# Patient Record
Sex: FEMALE | Race: WHITE | NOT HISPANIC OR LATINO | Employment: FULL TIME | URBAN - METROPOLITAN AREA
[De-identification: names, ages, dates, MRNs, and addresses within clinical notes are randomized per-mention and may not be internally consistent; named-entity substitution may affect disease eponyms.]

---

## 2018-02-03 ENCOUNTER — OFFICE VISIT (OUTPATIENT)
Dept: URGENT CARE | Facility: PHYSICIAN GROUP | Age: 48
End: 2018-02-03
Payer: COMMERCIAL

## 2018-02-03 ENCOUNTER — HOSPITAL ENCOUNTER (OUTPATIENT)
Facility: MEDICAL CENTER | Age: 48
End: 2018-02-03
Attending: PHYSICIAN ASSISTANT
Payer: COMMERCIAL

## 2018-02-03 VITALS
WEIGHT: 208 LBS | DIASTOLIC BLOOD PRESSURE: 76 MMHG | HEIGHT: 62 IN | TEMPERATURE: 98.3 F | HEART RATE: 83 BPM | OXYGEN SATURATION: 97 % | SYSTOLIC BLOOD PRESSURE: 122 MMHG | BODY MASS INDEX: 38.28 KG/M2 | RESPIRATION RATE: 16 BRPM

## 2018-02-03 DIAGNOSIS — L02.211 ABSCESS OF SKIN OF ABDOMEN: ICD-10-CM

## 2018-02-03 DIAGNOSIS — S31.109A OPEN WOUND OF ABDOMINAL WALL, INITIAL ENCOUNTER: ICD-10-CM

## 2018-02-03 PROCEDURE — 99000 SPECIMEN HANDLING OFFICE-LAB: CPT | Performed by: PHYSICIAN ASSISTANT

## 2018-02-03 PROCEDURE — 99204 OFFICE O/P NEW MOD 45 MIN: CPT | Performed by: PHYSICIAN ASSISTANT

## 2018-02-03 PROCEDURE — 87077 CULTURE AEROBIC IDENTIFY: CPT

## 2018-02-03 PROCEDURE — 87070 CULTURE OTHR SPECIMN AEROBIC: CPT

## 2018-02-03 PROCEDURE — 87075 CULTR BACTERIA EXCEPT BLOOD: CPT

## 2018-02-03 PROCEDURE — 87186 SC STD MICRODIL/AGAR DIL: CPT

## 2018-02-03 PROCEDURE — 87205 SMEAR GRAM STAIN: CPT

## 2018-02-03 RX ORDER — AMLODIPINE BESYLATE 10 MG/1
10 TABLET ORAL DAILY
COMMUNITY
End: 2021-07-28 | Stop reason: SDUPTHER

## 2018-02-03 RX ORDER — ATORVASTATIN CALCIUM 20 MG/1
20 TABLET, FILM COATED ORAL NIGHTLY
COMMUNITY
End: 2021-07-28

## 2018-02-03 RX ORDER — ATENOLOL 50 MG/1
50 TABLET ORAL DAILY
COMMUNITY
End: 2021-07-28 | Stop reason: SDUPTHER

## 2018-02-03 RX ORDER — SULFAMETHOXAZOLE AND TRIMETHOPRIM 800; 160 MG/1; MG/1
1 TABLET ORAL 2 TIMES DAILY
Qty: 20 TAB | Refills: 0 | Status: SHIPPED | OUTPATIENT
Start: 2018-02-03 | End: 2021-07-28

## 2018-02-04 LAB
GRAM STN SPEC: NORMAL
SIGNIFICANT IND 70042: NORMAL
SITE SITE: NORMAL
SOURCE SOURCE: NORMAL

## 2018-02-05 ASSESSMENT — ENCOUNTER SYMPTOMS
RESPIRATORY NEGATIVE: 1
CARDIOVASCULAR NEGATIVE: 1
NEUROLOGICAL NEGATIVE: 1
ROS SKIN COMMENTS: SEE HPI
GASTROINTESTINAL NEGATIVE: 1
MUSCULOSKELETAL NEGATIVE: 1
CONSTITUTIONAL NEGATIVE: 1
BRUISES/BLEEDS EASILY: 0

## 2018-02-05 NOTE — PROGRESS NOTES
Subjective:      Sanna Yuen is a 47 y.o. female who presents with Abscess (C/o sore on abd, 1 week)        Abscess     Patient presents today for concern of abdominal wall wound/abscess.  Patient states it has been there for approx 1 week.  She believes it may have drained but is not sure.  It is on an area where her pants rub and feels that having to work has exacerbated this all week. It is tender but not exquisitely so.  She has felt fatigued non-specifically however no fevers or chills.  No attempted interventions other than keeping it covered.  Patient is Type 2 DM.     Review of Systems   Constitutional: Negative.    Respiratory: Negative.    Cardiovascular: Negative.    Gastrointestinal: Negative.    Musculoskeletal: Negative.    Skin:        SEE HPI   Neurological: Negative.    Endo/Heme/Allergies: Does not bruise/bleed easily.   All other systems reviewed and are negative.       PMH:  has no past medical history of Breast cancer (CMS-[facility]).  MEDS:   Current Outpatient Prescriptions:   •  metFORMIN (GLUCOPHAGE) 500 MG Tab, Take 500 mg by mouth 2 times a day, with meals., Disp: , Rfl:   •  atorvastatin (LIPITOR) 20 MG Tab, Take 20 mg by mouth every evening., Disp: , Rfl:   •  atenolol (TENORMIN) 50 MG Tab, Take 50 mg by mouth every day., Disp: , Rfl:   •  amLODIPine (NORVASC) 10 MG Tab, Take 10 mg by mouth every day., Disp: , Rfl:   •  Multiple Vitamins-Minerals (MULTIVITAMIN ADULT PO), Take  by mouth., Disp: , Rfl:   •  IRON PO, Take  by mouth., Disp: , Rfl:   •  Multiple Vitamins-Minerals (EYE VITAMINS PO), Take  by mouth., Disp: , Rfl:   •  sulfamethoxazole-trimethoprim (BACTRIM DS) 800-160 MG tablet, Take 1 Tab by mouth 2 times a day., Disp: 20 Tab, Rfl: 0  ALLERGIES:   Allergies   Allergen Reactions   • Niacin Itching     Flushed face   • Penicillins Itching and Unspecified     Mouth sores     SURGHX: History reviewed. No pertinent surgical history.  SOCHX:  reports that she has been smoking Cigarettes.  " She has never used smokeless tobacco.  FH: Family history was reviewed, no pertinent findings to report     Objective:     /76   Pulse 83   Temp 36.8 °C (98.3 °F)   Resp 16   Ht 1.575 m (5' 2\")   Wt 94.3 kg (208 lb)   SpO2 97%   BMI 38.04 kg/m²      Physical Exam   Constitutional: She is oriented to person, place, and time. She appears well-developed and well-nourished. No distress.   Neck: Normal range of motion. Neck supple.   Cardiovascular: Normal rate and regular rhythm.    Pulmonary/Chest: Effort normal and breath sounds normal.   Abdominal: Soft. Bowel sounds are normal. There is no tenderness.       Lymphadenopathy:     She has no cervical adenopathy.        Right: No inguinal and no supraclavicular adenopathy present.        Left: No supraclavicular adenopathy present.   Neurological: She is alert and oriented to person, place, and time.   Skin: Skin is warm and dry.   Psychiatric: Her behavior is normal.   Vitals reviewed.          Assessment/Plan:     1. Abscess of skin of abdomen  sulfamethoxazole-trimethoprim (BACTRIM DS) 800-160 MG tablet    REFERRAL TO WOUND CLINIC    ANAEROBIC/AEROBIC/GRAM STAIN   2. Open wound of abdominal wall, initial encounter       5 x 5 mm        -wound irrigated and dressed for patient.   -basic wound care discussed, keeping clean, avoiding irritation from repeated rubbing,etc  -culture taken.   -oral abx for abscess/resultant mild cellulitis as on exam.   -watch for worsening redness, fevers and proceed to ER if this occurs  -PCP follow up  -wound referral placed for follow up.     Supportive care, differential diagnoses, and indications for immediate follow-up discussed with patient.   Pathogenesis of diagnosis discussed including typical length and natural progression.   Instructed to return to clinic or nearest emergency department for any change in condition, further concerns, or worsening of symptoms.  Patient states understanding of the plan of care and " discharge instructions.  Instructed to make an appointment, for follow up, with their primary care provider.      Prabha Cobian P.A.-C.

## 2018-02-06 LAB
BACTERIA WND AEROBE CULT: ABNORMAL
BACTERIA WND AEROBE CULT: ABNORMAL
GRAM STN SPEC: ABNORMAL
SIGNIFICANT IND 70042: ABNORMAL
SITE SITE: ABNORMAL
SOURCE SOURCE: ABNORMAL

## 2018-02-07 LAB
BACTERIA SPEC ANAEROBE CULT: NORMAL
SIGNIFICANT IND 70042: NORMAL
SITE SITE: NORMAL
SOURCE SOURCE: NORMAL

## 2018-02-14 ENCOUNTER — APPOINTMENT (OUTPATIENT)
Dept: WOUND CARE | Facility: MEDICAL CENTER | Age: 48
End: 2018-02-14
Attending: PHYSICIAN ASSISTANT
Payer: COMMERCIAL

## 2018-02-16 ENCOUNTER — APPOINTMENT (OUTPATIENT)
Dept: WOUND CARE | Facility: MEDICAL CENTER | Age: 48
End: 2018-02-16
Attending: PHYSICIAN ASSISTANT
Payer: COMMERCIAL

## 2018-04-28 ENCOUNTER — HOSPITAL ENCOUNTER (OUTPATIENT)
Dept: LAB | Facility: MEDICAL CENTER | Age: 48
End: 2018-04-28
Attending: FAMILY MEDICINE
Payer: COMMERCIAL

## 2018-04-28 LAB
ALBUMIN SERPL BCP-MCNC: 4 G/DL (ref 3.2–4.9)
ALBUMIN/GLOB SERPL: 1.4 G/DL
ALP SERPL-CCNC: 50 U/L (ref 30–99)
ALT SERPL-CCNC: 20 U/L (ref 2–50)
ANION GAP SERPL CALC-SCNC: 7 MMOL/L (ref 0–11.9)
AST SERPL-CCNC: 19 U/L (ref 12–45)
BASOPHILS # BLD AUTO: 1.4 % (ref 0–1.8)
BASOPHILS # BLD: 0.07 K/UL (ref 0–0.12)
BILIRUB SERPL-MCNC: 0.3 MG/DL (ref 0.1–1.5)
BUN SERPL-MCNC: 16 MG/DL (ref 8–22)
CALCIUM SERPL-MCNC: 8.7 MG/DL (ref 8.5–10.5)
CHLORIDE SERPL-SCNC: 106 MMOL/L (ref 96–112)
CHOLEST SERPL-MCNC: 178 MG/DL (ref 100–199)
CO2 SERPL-SCNC: 25 MMOL/L (ref 20–33)
CREAT SERPL-MCNC: 0.55 MG/DL (ref 0.5–1.4)
EOSINOPHIL # BLD AUTO: 0.2 K/UL (ref 0–0.51)
EOSINOPHIL NFR BLD: 4.1 % (ref 0–6.9)
ERYTHROCYTE [DISTWIDTH] IN BLOOD BY AUTOMATED COUNT: 48.5 FL (ref 35.9–50)
GLOBULIN SER CALC-MCNC: 2.9 G/DL (ref 1.9–3.5)
GLUCOSE SERPL-MCNC: 174 MG/DL (ref 65–99)
HCT VFR BLD AUTO: 43.1 % (ref 37–47)
HDLC SERPL-MCNC: 39 MG/DL
HGB BLD-MCNC: 13.8 G/DL (ref 12–16)
IMM GRANULOCYTES # BLD AUTO: 0.02 K/UL (ref 0–0.11)
IMM GRANULOCYTES NFR BLD AUTO: 0.4 % (ref 0–0.9)
LDLC SERPL CALC-MCNC: ABNORMAL MG/DL
LYMPHOCYTES # BLD AUTO: 1.48 K/UL (ref 1–4.8)
LYMPHOCYTES NFR BLD: 30.2 % (ref 22–41)
MCH RBC QN AUTO: 30.9 PG (ref 27–33)
MCHC RBC AUTO-ENTMCNC: 32 G/DL (ref 33.6–35)
MCV RBC AUTO: 96.4 FL (ref 81.4–97.8)
MONOCYTES # BLD AUTO: 0.59 K/UL (ref 0–0.85)
MONOCYTES NFR BLD AUTO: 12 % (ref 0–13.4)
NEUTROPHILS # BLD AUTO: 2.54 K/UL (ref 2–7.15)
NEUTROPHILS NFR BLD: 51.9 % (ref 44–72)
NRBC # BLD AUTO: 0 K/UL
NRBC BLD-RTO: 0 /100 WBC
PLATELET # BLD AUTO: 180 K/UL (ref 164–446)
PMV BLD AUTO: 12.4 FL (ref 9–12.9)
POTASSIUM SERPL-SCNC: 3.8 MMOL/L (ref 3.6–5.5)
PROT SERPL-MCNC: 6.9 G/DL (ref 6–8.2)
RBC # BLD AUTO: 4.47 M/UL (ref 4.2–5.4)
SODIUM SERPL-SCNC: 138 MMOL/L (ref 135–145)
TRIGL SERPL-MCNC: 461 MG/DL (ref 0–149)
TSH SERPL DL<=0.005 MIU/L-ACNC: 2.49 UIU/ML (ref 0.38–5.33)
WBC # BLD AUTO: 4.9 K/UL (ref 4.8–10.8)

## 2018-04-28 PROCEDURE — 36415 COLL VENOUS BLD VENIPUNCTURE: CPT

## 2018-04-28 PROCEDURE — 80053 COMPREHEN METABOLIC PANEL: CPT

## 2018-04-28 PROCEDURE — 85025 COMPLETE CBC W/AUTO DIFF WBC: CPT

## 2018-04-28 PROCEDURE — 80061 LIPID PANEL: CPT

## 2018-04-28 PROCEDURE — 84443 ASSAY THYROID STIM HORMONE: CPT

## 2019-02-08 ENCOUNTER — OFFICE VISIT (OUTPATIENT)
Dept: URGENT CARE | Facility: CLINIC | Age: 49
End: 2019-02-08
Payer: COMMERCIAL

## 2019-02-08 VITALS
HEART RATE: 78 BPM | OXYGEN SATURATION: 96 % | WEIGHT: 198 LBS | SYSTOLIC BLOOD PRESSURE: 110 MMHG | HEIGHT: 62 IN | BODY MASS INDEX: 36.44 KG/M2 | TEMPERATURE: 97.8 F | RESPIRATION RATE: 16 BRPM | DIASTOLIC BLOOD PRESSURE: 70 MMHG

## 2019-02-08 DIAGNOSIS — J02.9 PHARYNGITIS, UNSPECIFIED ETIOLOGY: ICD-10-CM

## 2019-02-08 PROCEDURE — 99214 OFFICE O/P EST MOD 30 MIN: CPT | Performed by: FAMILY MEDICINE

## 2019-02-08 RX ORDER — AZITHROMYCIN 250 MG/1
TABLET, FILM COATED ORAL
Qty: 6 TAB | Refills: 0 | Status: SHIPPED | OUTPATIENT
Start: 2019-02-08 | End: 2021-07-28

## 2019-02-08 ASSESSMENT — ENCOUNTER SYMPTOMS
SWOLLEN GLANDS: 1
HEADACHES: 1
SINUS PRESSURE: 0
COUGH: 1
SORE THROAT: 1

## 2019-02-08 NOTE — PROGRESS NOTES
"Subjective:   Sanna Awad a 48 y.o. female who presents for Sinus Problem (x this am, nasal congestion, stuffy nose, headaches,ear fullness and sore throat. Little cough)    Sinus Problem   This is a new problem. The current episode started in the past 7 days. The problem has been waxing and waning since onset. There has been no fever. The pain is mild. Associated symptoms include congestion, coughing, headaches, a sore throat and swollen glands. Pertinent negatives include no sinus pressure.     Review of Systems   HENT: Positive for congestion and sore throat. Negative for sinus pressure.    Respiratory: Positive for cough.    Neurological: Positive for headaches.     Allergies   Allergen Reactions   • Niacin Itching     Flushed face   • Penicillins Itching and Unspecified     Mouth sores      Objective:   /70 (BP Location: Left arm, Patient Position: Sitting, BP Cuff Size: Large adult)   Pulse 78   Temp 36.6 °C (97.8 °F) (Temporal)   Resp 16   Ht 1.575 m (5' 2\")   Wt 89.8 kg (198 lb)   SpO2 96%   BMI 36.21 kg/m²   Physical Exam   Constitutional: She is oriented to person, place, and time. She appears well-developed and well-nourished. No distress.   HENT:   Head: Normocephalic and atraumatic.   Mouth/Throat: Uvula is midline. Posterior oropharyngeal edema and posterior oropharyngeal erythema present. No tonsillar abscesses.   Eyes: Pupils are equal, round, and reactive to light. Conjunctivae and EOM are normal.   Cardiovascular: Normal rate and regular rhythm.    No murmur heard.  Pulmonary/Chest: Effort normal and breath sounds normal. No respiratory distress.   Abdominal: Soft. She exhibits no distension. There is no tenderness.   Lymphadenopathy:     She has cervical adenopathy.        Right cervical: Superficial cervical adenopathy present.        Left cervical: Superficial cervical adenopathy present.        Right: No supraclavicular adenopathy present.        Left: No supraclavicular " adenopathy present.   Neurological: She is alert and oriented to person, place, and time. She has normal reflexes. No sensory deficit.   Skin: Skin is warm and dry.   Psychiatric: She has a normal mood and affect.     Assessment/Plan:   Assessment    1. Pharyngitis, unspecified etiology  - azithromycin (ZITHROMAX) 250 MG Tab; Take 2 tablets by mouth on day one. Take one tablet by mouth the remaining days until gone  Dispense: 6 Tab; Refill: 0    Other orders  - Fexofenadine HCl (ALLEGRA PO); Take  by mouth.      Differential diagnosis, natural history, supportive care, and indications for immediate follow-up discussed.  Return if symptoms worsen or fail to improve.

## 2019-02-08 NOTE — PATIENT INSTRUCTIONS
Pharyngitis  Pharyngitis is redness, pain, and swelling (inflammation) of your pharynx.  What are the causes?  Pharyngitis is usually caused by infection. Most of the time, these infections are from viruses (viral) and are part of a cold. However, sometimes pharyngitis is caused by bacteria (bacterial). Pharyngitis can also be caused by allergies. Viral pharyngitis may be spread from person to person by coughing, sneezing, and personal items or utensils (cups, forks, spoons, toothbrushes). Bacterial pharyngitis may be spread from person to person by more intimate contact, such as kissing.  What are the signs or symptoms?  Symptoms of pharyngitis include:  · Sore throat.  · Tiredness (fatigue).  · Low-grade fever.  · Headache.  · Joint pain and muscle aches.  · Skin rashes.  · Swollen lymph nodes.  · Plaque-like film on throat or tonsils (often seen with bacterial pharyngitis).  How is this diagnosed?  Your health care provider will ask you questions about your illness and your symptoms. Your medical history, along with a physical exam, is often all that is needed to diagnose pharyngitis. Sometimes, a rapid strep test is done. Other lab tests may also be done, depending on the suspected cause.  How is this treated?  Viral pharyngitis will usually get better in 3-4 days without the use of medicine. Bacterial pharyngitis is treated with medicines that kill germs (antibiotics).  Follow these instructions at home:  · Drink enough water and fluids to keep your urine clear or pale yellow.  · Only take over-the-counter or prescription medicines as directed by your health care provider:  ¨ If you are prescribed antibiotics, make sure you finish them even if you start to feel better.  ¨ Do not take aspirin.  · Get lots of rest.  · Gargle with 8 oz of salt water (½ tsp of salt per 1 qt of water) as often as every 1-2 hours to soothe your throat.  · Throat lozenges (if you are not at risk for choking) or sprays may be used to  soothe your throat.  Contact a health care provider if:  · You have large, tender lumps in your neck.  · You have a rash.  · You cough up green, yellow-brown, or bloody spit.  Get help right away if:  · Your neck becomes stiff.  · You drool or are unable to swallow liquids.  · You vomit or are unable to keep medicines or liquids down.  · You have severe pain that does not go away with the use of recommended medicines.  · You have trouble breathing (not caused by a stuffy nose).  This information is not intended to replace advice given to you by your health care provider. Make sure you discuss any questions you have with your health care provider.  Document Released: 12/18/2006 Document Revised: 05/25/2017 Document Reviewed: 08/25/2014  [employer] Interactive Patient Education © 2017 [employer]

## 2019-02-08 NOTE — LETTER
February 8, 2019         Patient: Sanna Yuen   YOB: 1970   Date of Visit: 2/8/2019           To Whom it May Concern:    Sanna Yuen was seen in my clinic on 2/8/2019. She may return to work on 2/9/2019..    If you have any questions or concerns, please don't hesitate to call.        Sincerely,           Ifeanyi Ashley M.D.  Electronically Signed

## 2019-02-12 ENCOUNTER — OFFICE VISIT (OUTPATIENT)
Dept: URGENT CARE | Facility: CLINIC | Age: 49
End: 2019-02-12
Payer: COMMERCIAL

## 2019-02-12 VITALS
WEIGHT: 198 LBS | OXYGEN SATURATION: 95 % | HEIGHT: 62 IN | HEART RATE: 93 BPM | SYSTOLIC BLOOD PRESSURE: 112 MMHG | DIASTOLIC BLOOD PRESSURE: 68 MMHG | BODY MASS INDEX: 36.44 KG/M2 | TEMPERATURE: 98.1 F | RESPIRATION RATE: 14 BRPM

## 2019-02-12 DIAGNOSIS — R05.9 COUGH: ICD-10-CM

## 2019-02-12 DIAGNOSIS — J06.9 UPPER RESPIRATORY TRACT INFECTION, UNSPECIFIED TYPE: ICD-10-CM

## 2019-02-12 DIAGNOSIS — R09.81 SINUS CONGESTION: ICD-10-CM

## 2019-02-12 PROCEDURE — 99214 OFFICE O/P EST MOD 30 MIN: CPT | Performed by: PHYSICIAN ASSISTANT

## 2019-02-12 RX ORDER — PROMETHAZINE HYDROCHLORIDE AND CODEINE PHOSPHATE 6.25; 1 MG/5ML; MG/5ML
5 SYRUP ORAL EVERY 12 HOURS PRN
Qty: 60 ML | Refills: 0 | Status: SHIPPED | OUTPATIENT
Start: 2019-02-12 | End: 2019-02-19

## 2019-02-12 RX ORDER — CETIRIZINE HYDROCHLORIDE, PSEUDOEPHEDRINE HYDROCHLORIDE 5; 120 MG/1; MG/1
1 TABLET, FILM COATED, EXTENDED RELEASE ORAL 2 TIMES DAILY
Qty: 60 TAB | Refills: 0 | Status: SHIPPED | OUTPATIENT
Start: 2019-02-12 | End: 2021-07-28

## 2019-02-12 ASSESSMENT — ENCOUNTER SYMPTOMS
WHEEZING: 0
SORE THROAT: 1
SHORTNESS OF BREATH: 0
NAUSEA: 0
ABDOMINAL PAIN: 0
SPUTUM PRODUCTION: 0
FEVER: 0
CHILLS: 0
VOMITING: 0
DIARRHEA: 0
COUGH: 1

## 2019-02-12 NOTE — LETTER
February 12, 2019       Patient: Sanna Yuen   YOB: 1970   Date of Visit: 2/12/2019         To Whom It May Concern:    It is my medical opinion that Sanna Yuen should be excused from work for tomorrow due to illness.        If you have any questions or concerns, please don't hesitate to call [phone]          Sincerely,          Michael Rossi P.A.-C.  Electronically Signed

## 2019-02-12 NOTE — PROGRESS NOTES
"Subjective:   Sanna Yuen is a 48 y.o. female who presents for Follow-Up (pt was seen and not feeling better)        Cough   This is a new problem. The current episode started in the past 7 days. Associated symptoms include a sore throat. Pertinent negatives include no chills, ear pain, fever, rash, shortness of breath or wheezing.     Noted one day of cough, was told had strep throat, cough keeps awake at night, c/o HA w/ coughing, dry cough, denies wheeze, c/o ear pain bilat, denies nausea/vomitnig/abdpain/diarrhea/rash, denies pMH of asthma, remote PMH of bronchitis/pneumonia, tried using OTC cough meds.      Review of Systems   Constitutional: Negative for chills and fever.   HENT: Positive for congestion and sore throat. Negative for ear pain.    Respiratory: Positive for cough. Negative for sputum production, shortness of breath and wheezing.    Gastrointestinal: Negative for abdominal pain, diarrhea, nausea and vomiting.   Skin: Negative for rash.     Allergies   Allergen Reactions   • Niacin Itching     Flushed face   • Penicillins Itching and Unspecified     Mouth sores   I have worn a mask for the entire encounter with this patient.    Objective:   /68 (BP Location: Left arm, Patient Position: Sitting, BP Cuff Size: Adult)   Pulse 93   Temp 36.7 °C (98.1 °F)   Resp 14   Ht 1.575 m (5' 2\")   Wt 89.8 kg (198 lb)   SpO2 95%   BMI 36.21 kg/m²   Physical Exam   Constitutional: She is oriented to person, place, and time. She appears well-developed and well-nourished. No distress.   HENT:   Head: Normocephalic and atraumatic.   Right Ear: Tympanic membrane, external ear and ear canal normal.   Left Ear: Tympanic membrane, external ear and ear canal normal.   Nose: Nose normal.   Mouth/Throat: Uvula is midline and mucous membranes are normal. Posterior oropharyngeal erythema ( mild PND) present. No oropharyngeal exudate, posterior oropharyngeal edema or tonsillar abscesses.   Eyes: Conjunctivae and " lids are normal. Right eye exhibits no discharge. Left eye exhibits no discharge. No scleral icterus.   Neck: Neck supple.   Pulmonary/Chest: Effort normal. No respiratory distress. She has no decreased breath sounds. She has no wheezes. She has no rhonchi. She has no rales.   Musculoskeletal: Normal range of motion.   Lymphadenopathy:     She has cervical adenopathy ( mild bilat).   Neurological: She is alert and oriented to person, place, and time. She is not disoriented.   Skin: Skin is warm and dry. She is not diaphoretic. No erythema. No pallor.   Psychiatric: Her speech is normal and behavior is normal.   Nursing note and vitals reviewed.        Assessment/Plan:   1. Cough  - promethazine-codeine (PHENERGAN-CODEINE) 6.25-10 MG/5ML Syrup; Take 5 mL by mouth every 12 hours as needed for up to 7 days.  Dispense: 60 mL; Refill: 0    2. Sinus congestion  - cetirizine-psuedoephedrine (ZYRTEC-D) 5-120 MG per tablet; Take 1 Tab by mouth 2 times a day.  Dispense: 60 Tab; Refill: 0    3. Upper respiratory tract infection, unspecified type  Supportive care is reviewed with patient/caregiver - recommend to push PO fluids and electrolytes, Nsaids/tylenol, netti pot/saline irrig, humidifier in home, flonase, ponaris, antihistamine , decongestant, no abx now as just finished day #5 of zpack yesterday, discussed OTC options for s/sx, Cautioned regarding potential for sedation with medication.    Differential diagnosis, natural history, supportive care, and indications for immediate follow-up discussed.

## 2019-08-03 ENCOUNTER — HOSPITAL ENCOUNTER (OUTPATIENT)
Dept: LAB | Facility: MEDICAL CENTER | Age: 49
End: 2019-08-03
Attending: FAMILY MEDICINE
Payer: COMMERCIAL

## 2019-08-03 LAB
ALBUMIN SERPL BCP-MCNC: 4 G/DL (ref 3.2–4.9)
ALBUMIN/GLOB SERPL: 1.6 G/DL
ALP SERPL-CCNC: 52 U/L (ref 30–99)
ALT SERPL-CCNC: 22 U/L (ref 2–50)
ANION GAP SERPL CALC-SCNC: 10 MMOL/L (ref 0–11.9)
AST SERPL-CCNC: 22 U/L (ref 12–45)
BILIRUB SERPL-MCNC: 0.5 MG/DL (ref 0.1–1.5)
BUN SERPL-MCNC: 14 MG/DL (ref 8–22)
CALCIUM SERPL-MCNC: 8.8 MG/DL (ref 8.5–10.5)
CHLORIDE SERPL-SCNC: 105 MMOL/L (ref 96–112)
CHOLEST SERPL-MCNC: 164 MG/DL (ref 100–199)
CO2 SERPL-SCNC: 24 MMOL/L (ref 20–33)
CREAT SERPL-MCNC: 0.57 MG/DL (ref 0.5–1.4)
FASTING STATUS PATIENT QL REPORTED: NORMAL
GLOBULIN SER CALC-MCNC: 2.5 G/DL (ref 1.9–3.5)
GLUCOSE SERPL-MCNC: 142 MG/DL (ref 65–99)
HDLC SERPL-MCNC: 40 MG/DL
LDLC SERPL CALC-MCNC: 49 MG/DL
POTASSIUM SERPL-SCNC: 4.3 MMOL/L (ref 3.6–5.5)
PROT SERPL-MCNC: 6.5 G/DL (ref 6–8.2)
SODIUM SERPL-SCNC: 139 MMOL/L (ref 135–145)
TRIGL SERPL-MCNC: 377 MG/DL (ref 0–149)

## 2019-08-03 PROCEDURE — 80053 COMPREHEN METABOLIC PANEL: CPT

## 2019-08-03 PROCEDURE — 80061 LIPID PANEL: CPT

## 2019-08-03 PROCEDURE — 36415 COLL VENOUS BLD VENIPUNCTURE: CPT

## 2019-11-01 ENCOUNTER — OFFICE VISIT (OUTPATIENT)
Dept: URGENT CARE | Facility: CLINIC | Age: 49
End: 2019-11-01
Payer: COMMERCIAL

## 2019-11-01 VITALS
DIASTOLIC BLOOD PRESSURE: 82 MMHG | TEMPERATURE: 97.6 F | RESPIRATION RATE: 16 BRPM | HEART RATE: 68 BPM | HEIGHT: 62 IN | OXYGEN SATURATION: 97 % | BODY MASS INDEX: 37.17 KG/M2 | WEIGHT: 202 LBS | SYSTOLIC BLOOD PRESSURE: 124 MMHG

## 2019-11-01 DIAGNOSIS — M79.605 PAIN OF LEFT LOWER EXTREMITY: ICD-10-CM

## 2019-11-01 DIAGNOSIS — S76.911A MUSCLE STRAIN OF RIGHT THIGH, INITIAL ENCOUNTER: ICD-10-CM

## 2019-11-01 PROCEDURE — 99214 OFFICE O/P EST MOD 30 MIN: CPT | Performed by: PHYSICIAN ASSISTANT

## 2019-11-01 RX ORDER — METHYLPREDNISOLONE 4 MG/1
TABLET ORAL
Qty: 21 TAB | Refills: 0 | Status: SHIPPED | OUTPATIENT
Start: 2019-11-01 | End: 2021-07-28

## 2019-11-01 RX ORDER — FENOFIBRATE 145 MG/1
145 TABLET, COATED ORAL
Refills: 3 | COMMUNITY
Start: 2019-09-05 | End: 2021-07-28 | Stop reason: SDUPTHER

## 2019-11-01 ASSESSMENT — ENCOUNTER SYMPTOMS
WEAKNESS: 0
LEG PAIN: 1
FEVER: 0
TINGLING: 0
SENSORY CHANGE: 0
NAUSEA: 0
CHILLS: 0
DIARRHEA: 0
VOMITING: 0
WHEEZING: 0
SHORTNESS OF BREATH: 0

## 2019-11-20 ENCOUNTER — OCCUPATIONAL MEDICINE (OUTPATIENT)
Dept: URGENT CARE | Facility: CLINIC | Age: 49
End: 2019-11-20
Payer: COMMERCIAL

## 2019-11-20 VITALS
RESPIRATION RATE: 18 BRPM | DIASTOLIC BLOOD PRESSURE: 88 MMHG | HEART RATE: 84 BPM | HEIGHT: 62 IN | OXYGEN SATURATION: 98 % | WEIGHT: 198 LBS | TEMPERATURE: 97.5 F | BODY MASS INDEX: 36.44 KG/M2 | SYSTOLIC BLOOD PRESSURE: 136 MMHG

## 2019-11-20 DIAGNOSIS — S76.911A MUSCLE STRAIN OF RIGHT THIGH, INITIAL ENCOUNTER: ICD-10-CM

## 2019-11-20 PROCEDURE — 99214 OFFICE O/P EST MOD 30 MIN: CPT | Performed by: PHYSICIAN ASSISTANT

## 2019-11-20 RX ORDER — MELOXICAM 15 MG/1
15 TABLET ORAL DAILY
Qty: 30 TAB | Refills: 0 | Status: SHIPPED | OUTPATIENT
Start: 2019-11-20 | End: 2019-12-02 | Stop reason: SDUPTHER

## 2019-11-20 ASSESSMENT — ENCOUNTER SYMPTOMS
ARTHRALGIAS: 0
LEG PAIN: 1
VOMITING: 0
WEAKNESS: 0
MYALGIAS: 1
FEVER: 0

## 2019-11-20 NOTE — LETTER
[facility]  [address] [place] [address]  Phone:  [phone] - Fax:  [phone]   [facility] Progress Report and Disability Certification  Date of Service: 11/20/2019   No Show:  No  Date / Time of Next Visit: 11/29/2019   Claim Information   Patient Name: Sanna Yuen  Claim Number:     Employer: [employer]  Date of Injury: 5/12/2019     Insurer / TPA: [employer]  ID / SSN:     Occupation: DELI   Diagnosis: The encounter diagnosis was Muscle strain of right thigh, initial encounter.    Medical Information   Related to Industrial Injury? Yes    Subjective Complaints:  Date of Injury:  5/12/2019. Pt complains of right anterior upper thigh/ groin pain x 6 months. Pain is aching and does not radiate. Pain is worse with standing, walking, climbing stairs. Symptoms began suddenly after trip and fall accident at work- directly impacted pallet. Previously evaluated at [facility] - May 13th for this- states work comp paperwork was filed.  She was never reevaluated following this incident. She is unsure if this claim was closed. Stopped statin- this did not help. Tried steroid pack-helped a small amount, using heat-helps a bit, ibuprofen and naproxen do not help. Denies numbness or tingling in foot. Denies prior injury. Denies second deven   Objective Findings: right hip:  General: No gross deformity  ROM: flexion 70 degrees, extension 10, ER 20, IR 20, abduction 30, adduction 20   Palpation: Origin of quadrant and groin muscles diffusely tender to palpation.  Quadriceps tendon also moderately tender to palpation.  Right greater trochanter mildly tender to palpation.  Gluten max, gluten nontender to palpation.   Strength: Not tested as patient could not tolerate due to pain   special tests: + Sen + Fadir -, Straight leg raise -  Neuro: Sensation intact and equal bilaterally in LE's   Pre-Existing Condition(s):     Assessment:   Initial Visit    Status:  Additional Care Required  Permanent Disability:No    Plan:      Diagnostics:      Comments:  Start meloxicam.  Continue heat.  Work restrictions.  Follow-up at [facility] clinic in 7 to 10 days.    Disability Information   Status: Released to Restricted Duty    From:  2019  Through: 2019 Restrictions are:     Physical Restrictions   Sitting:    Standing:  < or = to 1 hr/day Stoopin hrs/day Bending:      Squattin hrs/day Walking:  < or = to 1 hr/day Climbing:    Pushing:      Pulling:    Other:    Reaching Above Shoulder (L):   Reaching Above Shoulder (R):       Reaching Below Shoulder (L):    Reaching Below Shoulder (R):      Not to exceed Weight Limits   Carrying(hrs):   Weight Limit(lb): < or = to 10 pounds Lifting(hrs):   Weight  Limit(lb): < or = to 10 pounds   Comments:      Repetitive Actions   Hands: i.e. Fine Manipulations from Grasping:     Feet: i.e. Operating Foot Controls:     Driving / Operate Machinery:     Physician Name: Juancarlos Becerra P.A.-C. Physician Signature:   e-Signature: Dr. Nilo Nguyễn, Medical Director   Clinic Name / Location: [facility]  [address] [place] [id number] Clinic Phone Number: Dept: [phone]   Appointment Time: 9:15 Am Visit Start Time: 9:35 AM   Check-In Time:  9:16 Am Visit Discharge Time: 10:13 AM   Original-Treating Physician or Chiropractor    Page 2-Insurer/TPA    Page 3-Employer    Page 4-Employee

## 2019-11-20 NOTE — LETTER
"EMPLOYEE’S CLAIM FOR COMPENSATION/ REPORT OF INITIAL TREATMENT  FORM C-4    EMPLOYEE’S CLAIM - PROVIDE ALL INFORMATION REQUESTED   First Name  Sanna Last Name  Alpa Birthdate                    1970                Sex  female Claim Number   Home Address  [address] Age  49 y.o. 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 (if applicable)  GOING OUT TO DUMP TRASH     How did this injury or occupational disease occur? (Be specific an answer in detail. 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  I hereby authorize any physician, chiropractor, surgeon, practitioner, or other person, any hospital, including [facility] or [facility], any medical service organization, any insurance company, or other institution or organization to release to each other, any medical or other information, including benefits paid or payable, pertinent to this injury or disease, except information relative to diagnosis, treatment and/or counseling for AIDS, psychological conditions, alcohol or controlled substances, for which I must give specific authorization.  A Photostat of this authorization shall be as valid as the original.     Date   Place   Employee’s Signature   THIS REPORT MUST BE COMPLETED AND MAILED WITHIN 3 WORKING DAYS OF TREATMENT   Place  [facility]  Name of Facility  [facility]   Date  11/20/2019 Diagnosis  (S76.911A) Muscle strain of right thigh, initial encounter Is there evidence the injured employee was under the influence of alcohol and/or another controlled substance at the time of accident?   Hour  9:35 AM Description of Injury or Disease  The encounter diagnosis was Muscle strain of right thigh, initial encounter.     Treatment  Start meloxicam.  Continue heat.  Work restrictions.  Follow-up at [facility] in 7 to 10 days.  Have you advised the patient to remain off work five days or more? No   X-Ray Findings      If Yes   From Date  To Date      From information given by the employee, together with medical evidence, can you directly connect this injury or occupational disease as job incurred?  Yes If No Full Duty No Modified Duty  Yes   Is additional medical care by a physician indicated?  Yes If Modified Duty, Specify any Limitations / Restrictions  Standing and walking not to exceed 1 hour total throughout the day.  Seated work only  "with 10 pound weight restriction.   Do you know of any previous injury or disease contributing to this condition or occupational disease?                            No   Date  11/20/2019 Print Doctor’s Name Juancarlos Becerra P.A.-C. I certify the employer’s copy of  this form was mailed on:   Address  [address] [employer]’s Use Only     [place] Zip  [address]    Provider’s Tax ID Number  [id number] Telephone  Dept: [phone]            e-Signature: Dr. Nilo Nguyễn,   Medical Director Degree  MD        ORIGINAL-TREATING PHYSICIAN OR CHIROPRACTOR    PAGE 2-INSURER/TPA    PAGE 3-EMPLOYER    PAGE 4-EMPLOYEE             Form C-4 (rev.10/07)              BRIEF DESCRIPTION OF RIGHTS AND BENEFITS  (Pursuant to [place]RS 616C.050)    Notice of Injury or Occupational Disease (Incident Report Form C-1): If an injury or occupational disease (OD) arises out of and in the course of employment, you must provide written notice to your employer as soon as practicable, but no later than 7 days after the accident or OD. Your employer shall maintain a sufficient supply of the required forms.    Claim for Compensation (Form C-4): If medical treatment is sought, the form C-4 is available at the place of initial treatment. A completed \"Claim for Compensation\" (Form C-4) must be filed within 90 days after an accident or OD. The treating physician or chiropractor must, within 3 working days after treatment, complete and mail to the employer, the employer's insurer and third-party , the Claim for Compensation.    Medical Treatment: If you require medical treatment for your on-the-job injury or OD, you may be required to select a physician or chiropractor from a list provided by your workers’ compensation insurer, if it has contracted with an Organization for Managed Care (MCO) or Preferred Provider Organization (PPO) or providers of health care. If your employer has not entered into a contract with an MCO or " PPO, you may select a physician or chiropractor from the Panel of Physicians and Chiropractors. Any medical costs related to your industrial injury or OD will be paid by your insurer.    Temporary Total Disability (TTD): If your doctor has certified that you are unable to work for a period of at least 5 consecutive days, or 5 cumulative days in a 20-day period, or places restrictions on you that your employer does not accommodate, you may be entitled to TTD compensation.    Temporary Partial Disability (TPD): If the wage you receive upon reemployment is less than the compensation for TTD to which you are entitled, the insurer may be required to pay you TPD compensation to make up the difference. TPD can only be paid for a maximum of 24 months.    Permanent Partial Disability (PPD): When your medical condition is stable and there is an indication of a PPD as a result of your injury or OD, within 30 days, your insurer must arrange for an evaluation by a rating physician or chiropractor to determine the degree of your PPD. The amount of your PPD award depends on the date of injury, the results of the PPD evaluation and your age and wage.    Permanent Total Disability (PTD): If you are medically certified by a treating physician or chiropractor as permanently and totally disabled and have been granted a PTD status by your insurer, you are entitled to receive monthly benefits not to exceed 66 2/3% of your average monthly wage. The amount of your PTD payments is subject to reduction if you previously received a PPD award.    Vocational Rehabilitation Services: You may be eligible for vocational rehabilitation services if you are unable to return to the job due to a permanent physical impairment or permanent restrictions as a result of your injury or occupational disease.    Transportation and Per Anthony Reimbursement: You may be eligible for travel expenses and per anthony associated with medical treatment.    Reopening: You  may be able to reopen your claim if your condition worsens after claim closure.    Appeal Process: If you disagree with a written determination issued by the insurer or the insurer does not respond to your request, you may appeal to the Department of Administration, , by following the instructions contained in your determination letter. You must appeal the determination within 70 days from the date of the determination letter at [address], [address], [place], [place] [address], or [address], [place], [place] [address]. If you disagree with the  decision, you may appeal to the Department of Administration, . You must file your appeal within 30 days from the date of the  decision letter at [address], [address], [place], [place] [address], or [address], [place], [place] [address]. If you disagree with a decision of an , you may file a petition for judicial review with the District Court. You must do so within 30 days of the Appeal Officer’s decision. You may be represented by an  at your own expense or you may contact the [facility] for possible representation.    [place]  for Injured Workers (NAIW): If you disagree with a  decision, you may request that NAIW represent you without charge at an  Hearing. For information regarding denial of benefits, you may contact the [facility] at: [address], [address], [place] [address], [phone], or [address], [place] [address], [phone]    To File a Complaint with the Division: If you wish to file a complaint with the  of the Division of Industrial Relations (DIR),  please contact the Workers’ Compensation Section, [address], [address], [place], [place] [address], telephone [phone], or [address], [address], [facility]  [place] [address], telephone [phone].    For assistance with Workers’ Compensation Issues: You may contact the Office of the Governor Consumer Health Assistance, [address] [place] [address], [address], [place], [place] [address], Toll Free 1-892.119.2306, Web site: [email or web address].nv., E-mail [email or web address].[email or web address].                   __________________________________________________________________                                                     _________        Employee Name / Signature                                                                                                                                              Date                                                                                                                                                                                                     D-2 (rev. 06/18)

## 2019-11-20 NOTE — PROGRESS NOTES
"Subjective:   Sanna Yuen is a 49 y.o. female who presents for Leg Injury (x6mo, right leg and right arm injury after falling, leg still painful)        Date of Injury:  5/12/2019. Pt complains of right anterior upper thigh/ groin pain x 6 months. Pain is aching and does not radiate. Pain is worse with standing, walking, climbing stairs. Symptoms began suddenly after trip and fall accident at work- directly impacted pallet. Previously evaluated at [place] - May 13th for this- states work comp paperwork was filed.  She was never reevaluated following this incident. She is unsure if this claim was closed. Stopped statin- this did not help. Tried steroid pack-helped a small amount, using heat-helps a bit, ibuprofen and naproxen do not help. Denies numbness or tingling in foot. Denies prior injury. Denies second job.        Leg Pain   This is a new problem. The current episode started more than 1 month ago. The problem occurs constantly. The problem has been unchanged. Associated symptoms include myalgias. Pertinent negatives include no arthralgias, fever, vomiting or weakness. The symptoms are aggravated by standing and walking. She has tried NSAIDs, heat, acetaminophen, rest and sleep for the symptoms. The treatment provided no relief.     Review of Systems   Constitutional: Negative for fever.   Gastrointestinal: Negative for vomiting.   Musculoskeletal: Positive for myalgias. Negative for arthralgias.   Neurological: Negative for weakness.       PMH: No pertinent past medical history to this problem  MEDS: Medications were reviewed in Epic  ALLERGIES: Allergies were reviewed in Epic  SOCHX: Works as  at [email or web address]  FH: No pertinent family history to this problem     Objective:   /88 (BP Location: Left arm, Patient Position: Sitting, BP Cuff Size: Adult)   Pulse 84   Temp 36.4 °C (97.5 °F) (Temporal)   Resp 18   Ht 1.575 m (5' 2\")   Wt 89.8 kg (198 lb)   SpO2 98%   BMI 36.21 kg/m²   Physical " Exam  Vitals signs reviewed.   Constitutional:       General: She is not in acute distress.     Appearance: Normal appearance. She is well-developed. She is not toxic-appearing.   HENT:      Head: Normocephalic and atraumatic.      Right Ear: External ear normal.      Left Ear: External ear normal.      Nose: Nose normal.   Eyes:      General: Lids are normal.      Conjunctiva/sclera: Conjunctivae normal.   Neck:      Musculoskeletal: Neck supple.   Cardiovascular:      Rate and Rhythm: Normal rate and regular rhythm.   Pulmonary:      Effort: Pulmonary effort is normal. No respiratory distress.      Breath sounds: Normal breath sounds.   Musculoskeletal:      Comments: right hip:  General: No gross deformity  ROM: flexion 70 degrees, extension 10, ER 20, IR 20, abduction 30, adduction 20   Palpation: Origin of quadrant and groin muscles diffusely tender to palpation.  Quadriceps tendon also moderately tender to palpation.  Right greater trochanter mildly tender to palpation.  Gluten max, gluten nontender to palpation.   Strength: Not tested as patient could not tolerate due to pain   special tests: + Sen + Fadir -, Straight leg raise -  Neuro: Sensation intact and equal bilaterally in LE's     Skin:     General: Skin is warm and dry.      Capillary Refill: Capillary refill takes less than 2 seconds.   Neurological:      Mental Status: She is alert and oriented to person, place, and time.      Cranial Nerves: No cranial nerve deficit.      Sensory: No sensory deficit.   Psychiatric:         Speech: Speech normal.         Behavior: Behavior normal.         Thought Content: Thought content normal.         Judgment: Judgment normal.           Assessment/Plan:   1. Muscle strain of right thigh, initial encounter      Patient instructed to rest and avoid aggravating activities.  Apply warm, damp heat to the affected area and perform gentle stretches as instructed in clinic.      As patient has not responded to other  NSAIDs I will trial patient on meloxicam.  Patient was advised that she may not take concurrently with other NSAIDs due to risk of GI bleeding.     Due to long duration of symptoms and complexity patient will follow-up at [facility] for next appointment.    Differential diagnosis, natural history, supportive care, and indications for immediate follow-up discussed.

## 2019-12-02 ENCOUNTER — OCCUPATIONAL MEDICINE (OUTPATIENT)
Dept: OCCUPATIONAL MEDICINE | Facility: CLINIC | Age: 49
End: 2019-12-02
Payer: COMMERCIAL

## 2019-12-02 VITALS
BODY MASS INDEX: 37.54 KG/M2 | HEART RATE: 94 BPM | WEIGHT: 204 LBS | OXYGEN SATURATION: 96 % | SYSTOLIC BLOOD PRESSURE: 136 MMHG | HEIGHT: 62 IN | DIASTOLIC BLOOD PRESSURE: 84 MMHG | TEMPERATURE: 96.8 F

## 2019-12-02 DIAGNOSIS — S76.911A MUSCLE STRAIN OF RIGHT THIGH, INITIAL ENCOUNTER: ICD-10-CM

## 2019-12-02 DIAGNOSIS — S76.911D MUSCLE STRAIN OF RIGHT THIGH, SUBSEQUENT ENCOUNTER: ICD-10-CM

## 2019-12-02 PROCEDURE — 99203 OFFICE O/P NEW LOW 30 MIN: CPT | Performed by: PREVENTIVE MEDICINE

## 2019-12-02 RX ORDER — MELOXICAM 15 MG/1
15 TABLET ORAL DAILY
Qty: 30 TAB | Refills: 0 | Status: SHIPPED | OUTPATIENT
Start: 2019-12-02 | End: 2021-07-28

## 2019-12-02 NOTE — PROGRESS NOTES
"Subjective:      Sanna Yuen is a 49 y.o. female who presents with Other (WC DOI 5/12/19 rt arm & leg, feeling the same.room 16)      DOI 5/12/2019: 48 yo female presents with right hip/thigh injury. Symptoms began suddenly after trip and fall accident at work- directly impacted pallet. She was evaluated at [employer] - May 13th for this, but did not feel the need to follow up. Pain however continued to be present after several months and so sought care the the urgent care. She was given work restrictions work restrictions, medrol dose pack and meloxicam.  Patient states that overall symptoms of only improved a little bit with meloxicam.  Patient notes pain is mostly in the anterior thigh, upper.  Pain worse with prolonged standing or walking.  Pain worse with prolonged sitting.  Pain worse with squatting.  Denies radiating pain, numbness or tingling.  Denies prior hip or back injury.  Taking meloxicam for relief.     HPI    ROS  ROS: All systems were reviewed on intake form, form was reviewed and signed. See scanned documents in media. Pertinent positives and negatives included in HPI.    PMH: No pertinent past medical history to this problem  MEDS: Medications were reviewed in Epic  ALLERGIES:   Allergies   Allergen Reactions   • Niacin Itching     Flushed face   • Penicillins Itching and Unspecified     Mouth sores     SOCHX: Works as a  at [employer]   FH: No pertinent family history to this problem     Objective:     /84   Pulse 94   Temp 36 °C (96.8 °F)   Ht 1.575 m (5' 2\")   Wt 92.5 kg (204 lb)   SpO2 96%   BMI 37.31 kg/m²      Physical Exam  Constitutional:       Appearance: Normal appearance.   HENT:      Head: Normocephalic and atraumatic.      Right Ear: External ear normal.      Left Ear: External ear normal.      Nose: Nose normal.   Eyes:      Extraocular Movements: Extraocular movements intact.      Conjunctiva/sclera: Conjunctivae normal.   Cardiovascular:      Rate and " Rhythm: Normal rate.   Pulmonary:      Effort: Pulmonary effort is normal.   Skin:     General: Skin is warm and dry.   Neurological:      General: No focal deficit present.      Mental Status: She is alert and oriented to person, place, and time.   Psychiatric:         Mood and Affect: Mood normal.         Thought Content: Thought content normal.         Judgment: Judgment normal.         Right lower extremity: No gross deform.  Tenderness over the proximal anterior quadricep and lateral hip.  Decreased hip flexion and internal rotation.  Slight weakness with hip flexion.  Slight antalgic gait.       Assessment/Plan:       1. Muscle strain of right thigh, subsequent encounter  - REFERRAL TO PHYSICAL THERAPY Reason for Therapy: Eval/Treat/Repor  2. Muscle strain of right thigh, initial encounter  - meloxicam (MOBIC) 15 MG tablet; Take 1 Tab by mouth every day.  Dispense: 30 Tab; Refill: 0    Referral to physical therapy  Sent refill for meloxicam  Gentle range of motion exercises  Restricted duty  Follow-up 4 weeks

## 2019-12-02 NOTE — LETTER
[address],   [address] [place] [address]  Phone:  [phone] - Fax:  [phone]   Occupational Health [facility] Progress Report and Disability Certification  Date of Service: 12/2/2019   No Show:  No  Date / Time of Next Visit: 12/30/2019 @ 1 PM   Claim Information   Patient Name: Sanna Yuen  Claim Number:     Employer: [employer]  Date of Injury: 5/12/2019     Insurer / TPA: [employer]  ID / SSN:     Occupation: DELI   Diagnosis: Diagnoses of Muscle strain of right thigh, subsequent encounter and Muscle strain of right thigh, initial encounter were pertinent to this visit.    Medical Information   Related to Industrial Injury?   Comments:Indeterminant    Subjective Complaints:  DOI 5/12/2019: 48 yo female presents with right hip/thigh injury. Symptoms began suddenly after trip and fall accident at work- directly impacted pallet. She was evaluated at [place] - May 13th for this, but did not feel the need to follow up. Pain however continued to be present after several months and so sought care the the urgent care. She was given work restrictions work restrictions, medrol dose pack and meloxicam.  Patient states that overall symptoms of only improved a little bit with meloxicam.  Patient notes pain is mostly in the anterior thigh, upper.  Pain worse with prolonged standing or walking.  Pain worse with prolonged sitting.  Pain worse with squatting.  Denies radiating pain, numbness or tingling.  Denies prior hip or back injury.  Taking meloxicam for relief.   Objective Findings: Right lower extremity: No gross deform.  Tenderness over the proximal anterior quadricep and lateral hip.  Decreased hip flexion and internal rotation.  Slight weakness with hip flexion.  Slight antalgic gait.   Pre-Existing Condition(s):     Assessment:   Condition Same    Status: Additional Care Required  Permanent Disability:No    Plan:      Diagnostics:      Comments:   Referral to physical therapy  Sent refill for meloxicam  Gentle range of motion exercises  Restricted duty  Follow-up 4 weeks    Disability Information   Status: Released to Restricted Duty    From:  2019  Through: 2019 Restrictions are: Temporary   Physical Restrictions   Sitting:    Standing:  < or = to 2 hrs/day Stooping:    Bending:      Squatting:  < or = to 1 hr/day Walking:  < or = to 2 hrs/day Climbin hrs/day Pushing:      Pulling:    Other:    Reaching Above Shoulder (L):   Reaching Above Shoulder (R):       Reaching Below Shoulder (L):    Reaching Below Shoulder (R):      Not to exceed Weight Limits   Carrying(hrs):   Weight Limit(lb): < or = to 25 pounds Lifting(hrs):   Weight  Limit(lb): < or = to 25 pounds   Comments: Seated work 75% of shift.  Allowed to alternate sitting and standing as needed for comfort    Repetitive Actions   Hands: i.e. Fine Manipulations from Grasping:     Feet: i.e. Operating Foot Controls:     Driving / Operate Machinery:     Physician Name: Wolfgang Main D.O. Physician Signature: WOLFGANG Munoz D.O. e-Signature: Dr. Nilo Nguyễn, Medical Director   Clinic Name / Location: [address],   [address] [place] [address] Clinic Phone Number: Dept: [phone]   Appointment Time: 1:00 Pm Visit Start Time: 1:00 PM   Check-In Time:  12:52 Pm Visit Discharge Time: 1:30 PM    Original-Treating Physician or Chiropractor    Page 2-Insurer/TPA    Page 3-Employer    Page 4-Employee

## 2019-12-28 ENCOUNTER — HOSPITAL ENCOUNTER (OUTPATIENT)
Dept: RADIOLOGY | Facility: MEDICAL CENTER | Age: 49
End: 2019-12-28
Attending: PHYSICIAN ASSISTANT
Payer: COMMERCIAL

## 2019-12-28 ENCOUNTER — OFFICE VISIT (OUTPATIENT)
Dept: URGENT CARE | Facility: CLINIC | Age: 49
End: 2019-12-28
Payer: COMMERCIAL

## 2019-12-28 VITALS
BODY MASS INDEX: 37.61 KG/M2 | RESPIRATION RATE: 16 BRPM | SYSTOLIC BLOOD PRESSURE: 120 MMHG | OXYGEN SATURATION: 98 % | HEART RATE: 100 BPM | DIASTOLIC BLOOD PRESSURE: 80 MMHG | TEMPERATURE: 97.6 F | HEIGHT: 62 IN | WEIGHT: 204.4 LBS

## 2019-12-28 DIAGNOSIS — M79.651 RIGHT THIGH PAIN: ICD-10-CM

## 2019-12-28 PROCEDURE — 93971 EXTREMITY STUDY: CPT | Mod: RT

## 2019-12-28 PROCEDURE — 76882 US LMTD JT/FCL EVL NVASC XTR: CPT | Mod: RT

## 2019-12-28 PROCEDURE — 99214 OFFICE O/P EST MOD 30 MIN: CPT | Performed by: PHYSICIAN ASSISTANT

## 2019-12-28 ASSESSMENT — ENCOUNTER SYMPTOMS
SHORTNESS OF BREATH: 0
HEMOPTYSIS: 0
NAUSEA: 0
VOMITING: 0
COUGH: 0
CHILLS: 0
FEVER: 0

## 2019-12-28 NOTE — PROGRESS NOTES
"Subjective:   Sanna Yuen is a 49 y.o. female who presents for Leg Pain (RT leg pain after fall x7 months)        Patient presents for continuing anterior upper thigh pain for the last 7 months.  Symptoms have been worsening.  Injury was previously filed under work comp-claim denied.  See HPI below from patient's last appointment with occupational health.  Pain is currently aching and worse with movement and direct pressure.  No improvement with work restrictions, oral steroids, meloxicam.  Denies leg swelling.  She endorses occasional distal numbness and tingling in her foot.  No other aggravating or alleviating factors.  She is concerned that she is unable to work due to symptoms.  Patient was referred to physical therapy while case was being processed as workers comp.  She never attended these appointments.  She states that she is not interested in participating in physical therapy, as she feels that it will not help her.    From patient's last occupational health appointment:  \"DOI 5/12/2019: 50 yo female presents with right hip/thigh injury. Symptoms began suddenly after trip and fall accident at work- directly impacted pallet. She was evaluated at [facility] - May 13th for this, but did not feel the need to follow up. Pain however continued to be present after several months and so sought care the the urgent care. She was given work restrictions work restrictions, medrol dose pack and meloxicam.  Patient states that overall symptoms of only improved a little bit with meloxicam.  Patient notes pain is mostly in the anterior thigh, upper.  Pain worse with prolonged standing or walking.  Pain worse with prolonged sitting.  Pain worse with squatting.  Denies radiating pain, numbness or tingling.  Denies prior hip or back injury.  Taking meloxicam for relief. \"    Review of Systems   Constitutional: Negative for chills and fever.   Respiratory: Negative for cough, hemoptysis and shortness of breath.  "   Gastrointestinal: Negative for nausea and vomiting.       PMH:  has no past medical history of Breast cancer (HCC).  MEDS:   Current Outpatient Medications:   •  meloxicam (MOBIC) 15 MG tablet, Take 1 Tab by mouth every day., Disp: 30 Tab, Rfl: 0  •  fenofibrate (TRICOR) 145 MG Tab, Take 145 mg by mouth., Disp: , Rfl: 3  •  cetirizine-psuedoephedrine (ZYRTEC-D) 5-120 MG per tablet, Take 1 Tab by mouth 2 times a day., Disp: 60 Tab, Rfl: 0  •  Fexofenadine HCl (ALLEGRA PO), Take  by mouth., Disp: , Rfl:   •  azithromycin (ZITHROMAX) 250 MG Tab, Take 2 tablets by mouth on day one. Take one tablet by mouth the remaining days until gone, Disp: 6 Tab, Rfl: 0  •  metFORMIN (GLUCOPHAGE) 500 MG Tab, Take 500 mg by mouth 2 times a day, with meals., Disp: , Rfl:   •  atorvastatin (LIPITOR) 20 MG Tab, Take 20 mg by mouth every evening., Disp: , Rfl:   •  atenolol (TENORMIN) 50 MG Tab, Take 50 mg by mouth every day., Disp: , Rfl:   •  amLODIPine (NORVASC) 10 MG Tab, Take 10 mg by mouth every day., Disp: , Rfl:   •  Multiple Vitamins-Minerals (MULTIVITAMIN ADULT PO), Take  by mouth., Disp: , Rfl:   •  IRON PO, Take  by mouth., Disp: , Rfl:   •  Multiple Vitamins-Minerals (EYE VITAMINS PO), Take  by mouth., Disp: , Rfl:   •  methylPREDNISolone (MEDROL DOSEPAK) 4 MG Tablet Therapy Pack, Follow schedule on package instructions. (Patient not taking: Reported on 12/2/2019), Disp: 21 Tab, Rfl: 0  •  sulfamethoxazole-trimethoprim (BACTRIM DS) 800-160 MG tablet, Take 1 Tab by mouth 2 times a day. (Patient not taking: Reported on 11/1/2019), Disp: 20 Tab, Rfl: 0  ALLERGIES:   Allergies   Allergen Reactions   • Niacin Itching     Flushed face   • Penicillins Itching and Unspecified     Mouth sores     SURGHX: History reviewed. No pertinent surgical history.  SOCHX:  reports that she has been smoking cigarettes. She has been smoking about 0.00 packs per day. She has never used smokeless tobacco. She reports previous alcohol use. She  "reports that she does not use drugs.  FH: Family history was reviewed, no pertinent findings to report   Objective:   /80 (BP Location: Left arm, Patient Position: Sitting, BP Cuff Size: Large adult)   Pulse 100   Temp 36.4 °C (97.6 °F)   Resp 16   Ht 1.575 m (5' 2\")   Wt 92.7 kg (204 lb 6.4 oz)   SpO2 98%   BMI 37.39 kg/m²   Physical Exam  Vitals signs reviewed.   Constitutional:       General: She is not in acute distress.     Appearance: Normal appearance. She is well-developed. She is not toxic-appearing.   HENT:      Head: Normocephalic and atraumatic.      Right Ear: External ear normal.      Left Ear: External ear normal.      Nose: Nose normal.   Eyes:      General: Lids are normal.      Conjunctiva/sclera: Conjunctivae normal.   Neck:      Musculoskeletal: Neck supple.   Cardiovascular:      Rate and Rhythm: Normal rate and regular rhythm.      Pulses:           Dorsalis pedis pulses are 2+ on the right side.        Posterior tibial pulses are 2+ on the right side.   Pulmonary:      Effort: Pulmonary effort is normal. No respiratory distress.      Breath sounds: Normal breath sounds.   Musculoskeletal:      Comments: Mid anterolateral right thigh tender to palpation.  There is a palpable mass in vicinity of vastus lateralis.  This area is exquisitely tender to palpation.  No overlying erythema or ecchymosis.  Posterior leg nontender to palpation.  No bony tenderness.  Patient refuses to flex thigh beyond 40 degrees due to pain, limiting musculoskeletal exam.   Skin:     General: Skin is warm and dry.      Capillary Refill: Capillary refill takes less than 2 seconds.   Neurological:      Mental Status: She is alert and oriented to person, place, and time.      Cranial Nerves: Cranial nerves are intact. No cranial nerve deficit.      Sensory: No sensory deficit.      Comments: Lower extremity sensation intact and even.   Psychiatric:         Speech: Speech normal.         Behavior: Behavior " normal.         Thought Content: Thought content normal.         Judgment: Judgment normal.           Assessment/Plan:   1. Right thigh pain  - US-EXTREMITY NON VASCULAR UNILATERAL RIGHT; Future  - REFERRAL TO PAIN CLINIC  - US-EXTREMITY VENOUS LOWER UNILAT RIGHT; Future  - REFERRAL TO SPORTS MEDICINE    Patient has a palpable mass at site of pain.  This may be muscular however I am concerned about other etiologies as patient has not responded at all to appropriate conservative care for musculoskeletal injury. Ultrasound ordered to further evaluate.     LE Venous:  FINDINGS:     REAL-TIME GRAY-SCALE IMAGING:  Real-time gray-scale imaging reveals no evidence of focal wall thickening.     COLOR AND DUPLEX DOPPLER IMAGING:  There is no evidence of luminal thrombus.  There is normal augmentation to flow and respiratory variation.     IMPRESSION:     No evidence of right lower extremity deep venous thrombosis.    LE Nonvascular:     FINDINGS:  No focal cystic or solid sonographic abnormality in the RIGHT mid thigh at site of critical concern.     IMPRESSION:     No evidence of mass or fluid collection in the RIGHT thigh at site of concern.    Patient contacted with ultrasound results.  If patient feels that she is not getting any relief from the meloxicam she may discontinue this medication.  I would like her to continue to apply warm compresses as these are helping symptoms.  I will refer patient to sports medicine for further evaluation.  Patient instructed to follow-up with PCP on Monday and patient was also referred to pain management for further evaluation and treatment.      Differential diagnosis, natural history, supportive care, and indications for immediate follow-up discussed.

## 2019-12-28 NOTE — LETTER
December 28, 2019    To Whom It May Concern:         This is confirmation that Sanna Yuen attended her scheduled appointment with Juancarlos Becerra P.A.-C. on 12/28/19. Please excuse her from work on 12/30-1/2.         If you have any questions please do not hesitate to call me at the phone number listed below.    Sincerely,          Juancarlos Becerra P.A.-C.  [phone]

## 2020-05-16 ENCOUNTER — HOSPITAL ENCOUNTER (EMERGENCY)
Facility: MEDICAL CENTER | Age: 50
End: 2020-05-16
Attending: EMERGENCY MEDICINE
Payer: COMMERCIAL

## 2020-05-16 VITALS
WEIGHT: 204.59 LBS | OXYGEN SATURATION: 99 % | SYSTOLIC BLOOD PRESSURE: 168 MMHG | BODY MASS INDEX: 37.65 KG/M2 | RESPIRATION RATE: 16 BRPM | HEIGHT: 62 IN | DIASTOLIC BLOOD PRESSURE: 82 MMHG | TEMPERATURE: 98.6 F | HEART RATE: 89 BPM
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
## 2020-05-16 DIAGNOSIS — S61.219A LACERATION OF FINGER OF LEFT HAND WITHOUT FOREIGN BODY WITHOUT DAMAGE TO NAIL, UNSPECIFIED FINGER, INITIAL ENCOUNTER: ICD-10-CM

## 2020-05-16 PROCEDURE — 304217 HCHG IRRIGATION SYSTEM

## 2020-05-16 PROCEDURE — 99283 EMERGENCY DEPT VISIT LOW MDM: CPT

## 2020-05-16 SDOH — HEALTH STABILITY: MENTAL HEALTH: HOW OFTEN DO YOU HAVE A DRINK CONTAINING ALCOHOL?: MONTHLY OR LESS

## 2020-05-16 ASSESSMENT — LIFESTYLE VARIABLES: DO YOU DRINK ALCOHOL: NO

## 2020-05-16 NOTE — LETTER
"  FORM C-4:  EMPLOYEE’S CLAIM FOR COMPENSATION/ REPORT OF INITIAL TREATMENT  EMPLOYEE’S CLAIM - PROVIDE ALL INFORMATION REQUESTED   First Name Liane Last Name Alpa Birthdate 1970  Sex female Claim Number   Home Employee Address [address] [facility] [place] [facility]                                     Zip  [address] Height  1.575 m (5' 2\") Weight  92.8 kg (204 lb 9.4 oz) [facility]     Mailing Employee Address [address] [facility] [place] [facility]               Zip  [address] Telephone  [phone] (home)  Primary Language Spoken  English   Insurer   Third Party    Employee's Occupation (Job Title) When Injury or Occupational Disease Occurred  [employer] [facility]   Employer's Name [employer] Telephone [phone]    Employer Address [address] [place] [facility] [[address]] Zip [address]   Date of Injury  5/16/2020       Hour of Injury  6:09 PM Date Employer Notified  5/16/2020 Last Day of Work after Injury or Occupational Disease  5/16/2020 Supervisor to Whom Injury Reported  Geoffrey Camacho   Address or Location of Accident (if applicable) [[place] [facility] [place]]   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 Be specific and answer in detail. Use additional sheet if necessary)  Moving meat, meat kept slipping, blade cut fingers   If you believe that you have an occupational disease, when did you first have knowledge of the disability and it relationship to your employment? N/A Witnesses to the Accident  None   Nature of Injury or Occupational Disease  Workers' Compensation Part(s) of Body Injured or Affected  Finger (R), Finger (R), N/A    I CERTIFY THAT THE ABOVE IS TRUE AND CORRECT TO THE BEST OF MY KNOWLEDGE AND THAT I HAVE PROVIDED THIS INFORMATION IN ORDER TO OBTAIN THE BENEFITS OF [place]  I HEREBY AUTHORIZE ANY " PHYSICIAN, CHIROPRACTOR, SURGEON, PRACTITIONER, OR OTHER PERSON, ANY HOSPITAL, INCLUDING [facility] OR [facility], ANY MEDICAL SERVICE ORGANIZATION, ANY INSURANCE COMPANY, OR OTHER INSTITUTION OR ORGANIZATION TO RELEASE TO EACH OTHER, ANY MEDICAL OR OTHER INFORMATION, INCLUDING BENEFITS PAID OR PAYABLE, PERTINENT TO THIS INJURY OR DISEASE, EXCEPT INFORMATION RELATIVE TO DIAGNOSIS, TREATMENT AND/OR COUNSELING FOR AIDS, PSYCHOLOGICAL CONDITIONS, ALCOHOL OR CONTROLLED SUBSTANCES, FOR WHICH I MUST GIVE SPECIFIC AUTHORIZATION.  A PHOTOSTAT OF THIS AUTHORIZATION SHALL BE AS VALID AS THE ORIGINAL.  Date 05/16/2020                    Place [facility]                    Employee’s Signature   THIS REPORT MUST BE COMPLETED AND MAILED WITHIN 3 WORKING DAYS OF TREATMENT   Place [facility], EMERGENCY DEPT                                                                             Name of Facility [facility]   Date  5/16/2020 Diagnosis  (S61.219A) Laceration of finger of left hand without foreign body without damage to nail, unspecified finger, initial encounter  (Y99.0) Work related injury Is there evidence the injured employee was under the influence of alcohol and/or another controlled substance at the time of accident?   Hour  8:50 PM Description of Injury or Disease  Laceration of finger of left hand without foreign body without damage to nail, unspecified finger, initial encounter  Work related injury No   Treatment  Laceration closure.  Have you advised the patient to remain off work five days or more?         No   X-Ray Findings    Comments:Not indicated. If Yes   From Date    To Date      From information given by the employee, together with medical evidence, can you directly connect this injury or occupational disease as job incurred? Yes If No, is employee capable of: Full Duty  No Modified Duty  Yes   Is additional medical care by a  "physician indicated?   Comments:Occupational health follow-up, per protocol. If Modified Duty, Specify any Limitations / Restrictions   No use of left hand until cleared by occupational health, or for 1 week, whichever is sooner.   Do you know of any previous injury or disease contributing to this condition or occupational disease? No    Date 5/16/2020 Print Doctor’s Name Tommie Ambrocio I certify the employer’s copy of this form was mailed on:   Address [address] [place] [id number] [phone] INSURER’S USE ONLY   Provider’s Tax ID Number [id number] Telephone Dept: [phone]    Doctor’s Signature karina-TOMMIE Lewis M.D. Degree M.D.      Form C-4 (rev.10/07)                                                                         BRIEF DESCRIPTION OF RIGHTS AND BENEFITS  ([place]ursuant to [place] 616[place].050)    Notice of Injury or Occupational Disease (Incident Report Form C-1): If an injury or occupational disease (OD) arises out of and in the course of employment, you must provide written notice to your employer as soon as practicable, but no later than 7 days after the accident or OD. Your employer shall maintain a sufficient supply of the required forms.    Claim for Compensation (Form C-4): If medical treatment is sought, the form C-4 is available at the place of initial treatment. A completed \"Claim for Compensation\" (Form C-4) must be filed within 90 days after an accident or OD. The treating physician or chiropractor must, within 3 working days after treatment, complete and mail to the employer, the employer's insurer and third-party , the Claim for Compensation.    Medical Treatment: If you require medical treatment for your on-the-job injury or OD, you may be required to select a physician or chiropractor from a list provided by your workers’ compensation insurer, if it has contracted with an Organization for Managed Care (MCO) or Preferred Provider Organization (PPO) or providers of " health care. If your employer has not entered into a contract with an MCO or PPO, you may select a physician or chiropractor from the Panel of Physicians and Chiropractors. Any medical costs related to your industrial injury or OD will be paid by your insurer.    Temporary Total Disability (TTD): If your doctor has certified that you are unable to work for a period of at least 5 consecutive days, or 5 cumulative days in a 20-day period, or places restrictions on you that your employer does not accommodate, you may be entitled to TTD compensation.    Temporary Partial Disability (TPD): If the wage you receive upon reemployment is less than the compensation for TTD to which you are entitled, the insurer may be required to pay you TPD compensation to make up the difference. TPD can only be paid for a maximum of 24 months.    Permanent Partial Disability (PPD): When your medical condition is stable and there is an indication of a PPD as a result of your injury or OD, within 30 days, your insurer must arrange for an evaluation by a rating physician or chiropractor to determine the degree of your PPD. The amount of your PPD award depends on the date of injury, the results of the PPD evaluation and your age and wage.    Permanent Total Disability (PTD): If you are medically certified by a treating physician or chiropractor as permanently and totally disabled and have been granted a PTD status by your insurer, you are entitled to receive monthly benefits not to exceed 66 2/3% of your average monthly wage. The amount of your PTD payments is subject to reduction if you previously received a PPD award.    Vocational Rehabilitation Services: You may be eligible for vocational rehabilitation services if you are unable to return to the job due to a permanent physical impairment or permanent restrictions as a result of your injury or occupational disease.    Transportation and Per Amber Reimbursement: You may be eligible for travel  expenses and per anthony associated with medical treatment.    Reopening: You may be able to reopen your claim if your condition worsens after claim closure.     Appeal Process: If you disagree with a written determination issued by the insurer or the insurer does not respond to your request, you may appeal to the Department of Administration, , by following the instructions contained in your determination letter. You must appeal the determination within 70 days from the date of the determination letter at [address], [address], [place], [place] [address], or [address], [place], [place] [address]. If you disagree with the  decision, you may appeal to the Department of Administration, . You must file your appeal within 30 days from the date of the  decision letter at [address], [address], [place], [place] [address], or [address], [address] [facility] [address] [facility] [address], [place], [place] [address]. If you disagree with a decision of an , you may file a petition for judicial review with the District Court. You must do so within 30 days of the Appeal Officer’s decision. You may be represented by an  at your own expense or you may contact the [facility] for possible representation.    [place]  for Injured Workers (NAIW): If you disagree with a  decision, you may request that NAIW represent you without charge at an  Hearing. For information regarding denial of benefits, you may contact the [facility] at: [address], [address], [place] [address], [phone], or [address] [facility] [address] [facility] [address], [place] [address], [phone]    To File a Complaint with the Division: If you wish to file a complaint with the  of the Division of Industrial Relations (DIR),  please contact the Workers’ Compensation Section, [address], [address], [place], [place] [address],  telephone [phone], or [address], [address], [place], [place] [address], telephone [phone].    For assistance with Workers’ Compensation Issues: You may contact the Office of the Governor Consumer Health Assistance, [address] [place] [address], [address], [place], [place] [address], Toll Free 1-370.945.6352, Web site: [email or web address]., E-mail [email or web address].  D-2 (rev. 06/18)              __________________________________________________________________                                    ______05/16/2020___________            Employee Name / Signature                                                                                                                            Date

## 2020-05-16 NOTE — Clinical Note
Sanna Yuen was seen and treated in our emergency department on 5/16/2020.  She may return to work on 05/19/2020.       If you have any questions or concerns, please don't hesitate to call.      Jed Ambrocio M.D.

## 2020-05-17 NOTE — ED NOTES
All lines and monitors discontinued. Discharge instructions given, questions answered.    ambultory out of ER, escorted by self.  Instructed not to drive after taking pain medication and pt verbalizes understanding.

## 2020-05-17 NOTE — ED PROVIDER NOTES
ED Provider Note    Scribed for Jed Ambrocio M.D. by Jed Ambrocio M.D.. 5/16/2020  8:30 PM    CHIEF COMPLAINT  Chief Complaint   Patient presents with   • Laceration     Right pointer and middle finger on  at work; injury occured at 1800. - blood thinners, up to date on tetnus       HPI  Sanna Yuen is a 49 y.o. 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  Her tetanus shot is up-to-date.  Bleeding was controlled prior to arrival.  She has no history of easy bleeding or bruising.  She has not had any recent illness including fevers or chills or cough or shortness of breath.  She has no pain to anywhere other than her fingers, which has a mild sharp pain to the affected lacerations.  The pain is nonradiating.  It is made worse with palpation.  It is not relieved by anything.  The patient irrigated the wounds and wash with soap and water prior to arrival.    REVIEW OF SYSTEMS  See HPI for further details.    PAST MEDICAL HISTORY   has a past medical history of Diabetes (HCC) and Hypertension.    SOCIAL HISTORY  Social History     Tobacco Use   • Smoking status: Current Every Day Smoker     Packs/day: 0.50     Types: Cigarettes   • Smokeless tobacco: Never Used   Substance and Sexual Activity   • Alcohol use: Yes     Frequency: Monthly or less   • Drug use: Never   • Sexual activity: Not on file       SURGICAL HISTORY  patient denies any surgical history    CURRENT MEDICATIONS  Home Medications     Reviewed by Cyndie Oseguera R.N. (Registered Nurse) on 05/16/20 at 1933  Med List Status: Partial   Medication Last Dose Status   amLODIPine (NORVASC) 10 MG Tab  Active   atenolol (TENORMIN) 50 MG Tab  Active   atorvastatin (LIPITOR) 20 MG Tab  Active   azithromycin (ZITHROMAX) 250 MG Tab  Active   cetirizine-psuedoephedrine (ZYRTEC-D) 5-120  "MG per tablet  Active   fenofibrate (TRICOR) 145 MG Tab  Active   Fexofenadine HCl (ALLEGRA PO)  Active   IRON PO  Active   meloxicam (MOBIC) 15 MG tablet  Active   metFORMIN (GLUCOPHAGE) 500 MG Tab  Active   methylPREDNISolone (MEDROL DOSEPAK) 4 MG Tablet Therapy Pack  Active   Multiple Vitamins-Minerals (EYE VITAMINS PO)  Active   Multiple Vitamins-Minerals (MULTIVITAMIN ADULT PO)  Active   sulfamethoxazole-trimethoprim (BACTRIM DS) 800-160 MG tablet  Active                ALLERGIES  Allergies   Allergen Reactions   • Niacin Itching     Flushed face   • Penicillins Itching and Unspecified     Mouth sores       PHYSICAL EXAM  VITAL SIGNS: BP (!) 178/97   Pulse 99   Temp 36.2 °C (97.1 °F)   Resp 16   Ht 1.575 m (5' 2\")   Wt 92.8 kg (204 lb 9.4 oz)   SpO2 98%   BMI 37.42 kg/m²   Pulse ox interpretation: I interpret this pulse ox as normal.  Constitutional: Alert in no apparent distress.  HENT: Normocephalic, Atraumatic, Bilateral external ears normal. Nose normal.   Eyes: Conjunctiva normal, non-icteric.   Heart: Normal peripheral perfusion.  Lungs: Unlabored respirations.  Skin: Very shallow, just barely subcutaneous clean linear lacerations to the tips of the right middle and pointer fingers, with a very small amount of venous bleeding.  Neurologic: Alert, Grossly non-focal.   Psychiatric: Affect normal, Judgment normal, Mood normal, Appears appropriate and not intoxicated.     COURSE & MEDICAL DECISION MAKING  The patient's VS, Nurses notes reviewed. (See chart for details)    8:30 PM Patient seen and examined at bedside.  She will need Steri-Strip closure of the small lacerations.  Suturing is not necessary.    Laceration Repair Procedure Note    Indication: Lacerations    Procedure: The patient was placed in the appropriate position and anesthesia around the lacerations were not necessary due to closure technique. The area was then irrigated with normal saline. The laceration was closed with steri strips. " A second laceration was closed with steri strips. The wound area was then dressed with a bandage.      Total repaired wound length: 2 cm.     Other Items: None    The patient tolerated the procedure well.    Complications: None    This patient tolerated wound closure well.  Because she is right-hand dominant, and has to work with her hands at work, she will be given tomorrow off to heal, and Monday off to follow-up with occupational health, especially as she indicated that her boss was uncooperative with her injuries, and reports that she was asked to find her own coverage for this shift prior to coming in for evaluation, and there was a sense of conflict.  I explained to the patient that this is best mitigated by follow-up through the appropriate channels, which in this case is occupational health.     The patient will return for new or worsening symptoms and is stable at the time of discharge.    The patient is referred to a primary physician for blood pressure management, diabetic screening, and for all other preventative health concerns.      DISPOSITION:  Patient will be discharged home in stable condition.    FOLLOW UP:  [address] [address] [phone]    On Monday      OUTPATIENT MEDICATIONS:  New Prescriptions    No medications on file         FINAL IMPRESSION  1. Laceration of finger of right hand without foreign body without damage to nail, unspecified finger, initial encounter    2. Work related injury

## 2020-05-17 NOTE — DISCHARGE INSTRUCTIONS
Allow 24 hours for initial healing before getting the laceration area wet.  You can keep it dry for an additional 24 hours by wearing a glove when you take a shower.  After that, you can allow the bandaged areas to get wet with normal soap and water.  The bandages be allowed to peel off a little bit of the time.  Trim the edges as they start to curl up.  If bandages are still attached on day 5, you can gently remove them under warm water.

## 2020-05-17 NOTE — ED NOTES
Pt ambulated to room without assistance. Strong gate noted. Pt has minor laceration to tips of index and middle fingers on left hand. Bleeding controlled.

## 2020-05-17 NOTE — ED TRIAGE NOTES
Sanna Obregon Alpa  49 y.o. female  Chief Complaint   Patient presents with   • Laceration     Right pointer and middle finger on  at work; injury occured at 1800. - blood thinners, up to date on tetnus       Pt amb to triage with steady gait for above complaint.Bleeding controlled with pressure.   Pt is alert and oriented, speaking in full sentences, follows commands and responds appropriately to questions. Resp are even and unlabored. No behavioral indicators of pain.   Pt placed in lobby. Pt educated on triage process. Pt encouraged to alert staff for any changes.

## 2020-05-18 ENCOUNTER — OCCUPATIONAL MEDICINE (OUTPATIENT)
Dept: URGENT CARE | Facility: CLINIC | Age: 50
End: 2020-05-18
Payer: COMMERCIAL

## 2020-05-18 VITALS
BODY MASS INDEX: 37.54 KG/M2 | HEART RATE: 92 BPM | RESPIRATION RATE: 16 BRPM | HEIGHT: 62 IN | SYSTOLIC BLOOD PRESSURE: 132 MMHG | WEIGHT: 204 LBS | OXYGEN SATURATION: 97 % | TEMPERATURE: 98.8 F | DIASTOLIC BLOOD PRESSURE: 86 MMHG

## 2020-05-18 DIAGNOSIS — S61.219D LACERATION OF FINGER OF RIGHT HAND WITHOUT FOREIGN BODY WITHOUT DAMAGE TO NAIL, UNSPECIFIED FINGER, SUBSEQUENT ENCOUNTER: ICD-10-CM

## 2020-05-18 PROCEDURE — 99213 OFFICE O/P EST LOW 20 MIN: CPT | Performed by: NURSE PRACTITIONER

## 2020-05-18 ASSESSMENT — ENCOUNTER SYMPTOMS
NEUROLOGICAL NEGATIVE: 1
MYALGIAS: 1
RESPIRATORY NEGATIVE: 1
CARDIOVASCULAR NEGATIVE: 1
PSYCHIATRIC NEGATIVE: 1
CONSTITUTIONAL NEGATIVE: 1

## 2020-05-18 ASSESSMENT — PAIN SCALES - GENERAL: PAINLEVEL: 8=MODERATE-SEVERE PAIN

## 2020-05-18 NOTE — PROGRESS NOTES
Subjective:      Sanna Yuen is a 49 y.o. female who presents with Follow-Up ( DOI 05/16/20 Finger - Same - RM 06)      DOI 5/16/20: Sanna Yuen is a 49 y.o. 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  Her tetanus shot is up-to-date.  Bleeding was controlled prior to arrival.  She has no history of easy bleeding or bruising.  She has not had any recent illness including fevers or chills or cough or shortness of breath.  She has no pain to anywhere other than her fingers, which has a mild sharp pain to the affected lacerations.  The pain is nonradiating.  It is made worse with palpation.  It is not relieved by anything.  The patient irrigated the wounds and wash with soap and water prior to arrival.    Today patient overall improved. Pain described as intermittent, achy, and tender. Patient denies numbness, tingling, or signs and symptoms of infection. Patient states that she did have a little bloody discharge, but no other symptoms. Patient's steristrips are in place, however were very soiled. Steristrips were changed at this visit, without complication. Patient has not been taking anything for pain. Patient has been tolerating light duty and keeping her fingers covered at work. Plan of care discussed with patient.      HPI    Review of Systems   Constitutional: Negative.    Respiratory: Negative.    Cardiovascular: Negative.    Musculoskeletal: Positive for myalgias.   Skin:        Shallow lacerations to right middle and pointer finger.    Neurological: Negative.    Psychiatric/Behavioral: Negative.         ROS: All systems were reviewed on intake form, form was reviewed and signed. See scanned documents in media. Pertinent positives and negatives included in HPI.    PMH: No pertinent past medical history to this problem  MEDS: Medications  "were reviewed in Epic  ALLERGIES:   Allergies   Allergen Reactions   • Niacin Itching     Flushed face   • Penicillins Itching and Unspecified     Mouth sores     SOCHX: Works as  at [employer]  FH: No pertinent family history to this problem   Objective:     /86   Pulse 92   Temp 37.1 °C (98.8 °F) (Temporal)   Resp 16   Ht 1.575 m (5' 2\")   Wt 92.5 kg (204 lb)   SpO2 97%   BMI 37.31 kg/m²      Physical Exam  Constitutional:       General: She is not in acute distress.     Appearance: Normal appearance. She is not ill-appearing.   Cardiovascular:      Rate and Rhythm: Normal rate and regular rhythm.      Pulses: Normal pulses.   Pulmonary:      Effort: Pulmonary effort is normal.   Musculoskeletal: Normal range of motion.         General: Tenderness and signs of injury present. No swelling or deformity.   Skin:     General: Skin is warm and dry.      Capillary Refill: Capillary refill takes less than 2 seconds.      Findings: No bruising or erythema.   Neurological:      General: No focal deficit present.      Mental Status: She is alert and oriented to person, place, and time.      Cranial Nerves: No cranial nerve deficit.      Sensory: No sensory deficit.      Motor: No weakness.      Gait: Gait normal.   Psychiatric:         Mood and Affect: Mood normal.         Behavior: Behavior normal.         Right fingers middle and pointer: Very shallow, just barely subcutaneous clean linear lacerations to the tips of the right middle and pointer fingers, with a very small amount of venous bleeding.Edges are well approximated. Neg edema, erythema, discharge, or warmth noted. Brisk cap refill. Distal sensation and neurovascular intact.       Assessment/Plan:       1. Laceration of finger of right hand without foreign body without damage to nail, unspecified finger, subsequent encounter    Follow-up in 1 week for wound check   Restricted duty, no washing dishes or heavy lifting   Continue with OTC " tylenol/Ibuprofen as needed   Continue monitoring wound for signs and symptoms of infection ie redness, swelling, foul discharge, and warmth   Keep Steri-strips in place until they fall off   DO not submerge in water

## 2020-05-18 NOTE — LETTER
[facility]  [address], [address] [place] [address]  Phone:  [phone] - Fax:  [phone]   [facility] Progress Report and Disability Certification  Date of Service: 5/18/2020   No Show:  No  Date / Time of Next Visit: 5/27/2020   Claim Information   Patient Name: Sanna Yuen  Claim Number:     Employer: [employer]  Date of Injury: 5/16/2020     Insurer / TPA: [employer]  ID / SSN:     Occupation: Deli  Diagnosis: The encounter diagnosis was Laceration of finger of right hand without foreign body without damage to nail, unspecified finger, subsequent encounter.    Medical Information   Related to Industrial Injury? Yes    Subjective Complaints:  DOI 5/16/20: Sanna Yuen is a 49 y.o. 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  Her tetanus shot is up-to-date.  Bleeding was controlled prior to arrival.  She has no history of easy bleeding or bruising.  She has not had any recent illness including fevers or chills or cough or shortness of breath.  She has no pain to anywhere other than her fingers, which has a mild sharp pain to the affected lacerations.  The pain is nonradiating.  It is made worse with palpation.  It is not relieved by anything.  The patient irrigated the wounds and wash with soap and water prior to arrival.    Today patient overall improved. Pain described as intermittent, achy, and tender. Patient denies numbness, tingling, or signs and symptoms of infection. Patient states that she did have a little bloody discharge, but no other symptoms. Patient's steristrips are in place, however were very soiled. Steristrips were changed at this visit, without complication. Patient has not been taking anything for pain. Patient has been tolerating light duty and keeping  her fingers covered at work. Plan of care discussed with patient.    Objective Findings: Right fingers middle and pointer: Very shallow, just barely subcutaneous clean linear lacerations to the tips of the right middle and pointer fingers, with a very small amount of venous bleeding.Edges are well approximated. Neg edema, erythema, discharge, or warmth noted. Brisk cap refill. Distal sensation and neurovascular intact.   Pre-Existing Condition(s):     Assessment:   Condition Improved    Status: Additional Care Required  Permanent Disability:     Plan:      Diagnostics:      Comments:  Follow-up in 1 week for wound check  Restricted duty, no washing dishes or heavy lifting  Continue with OTC tylenol/Ibuprofen as needed  Continue monitoring wound for signs and symptoms of infection ie redness, swelling, foul discharge, and warmth  K  eep Steri-strips in place until they fall off  DO not submerge in water    Disability Information   Status: Released to Restricted Duty    From:  5/18/2020  Through: 5/27/2020 Restrictions are: Temporary   Physical Restrictions   Sitting:    Standing:    Stooping:    Bending:      Squatting:    Walking:    Climbing:    Pushing:      Pulling:    Other:    Reaching Above Shoulder (L):   Reaching Above Shoulder (R):       Reaching Below Shoulder (L):    Reaching Below Shoulder (R):      Not to exceed Weight Limits   Carrying(hrs):   Weight Limit(lb): < or = to 10 pounds  Comments:Right hand only Lifting(hrs):   Weight  Limit(lb): < or = to 10 pounds  Comments:Right hand only   Comments: Keep hand covered while at work    Repetitive Actions   Hands: i.e. Fine Manipulations from Grasping:     Feet: i.e. Operating Foot Controls:     Driving / Operate Machinery:     Physician Name: NIMA Lopez Physician Signature:   e-Signature: Dr. Nilo Nguyễn, Medical Director   Clinic Name / Location: [facility]  [address], [address]  [place] [address] Clinic Phone  Number: Dept: [phone]   Appointment Time: 3:45 Pm Visit Start Time: 3:26 PM   Check-In Time:  3:21 Pm Visit Discharge Time:  4:09 PM   Original-Treating Physician or Chiropractor    Page 2-Insurer/TPA    Page 3-Employer    Page 4-Employee

## 2020-05-27 ENCOUNTER — OCCUPATIONAL MEDICINE (OUTPATIENT)
Dept: URGENT CARE | Facility: CLINIC | Age: 50
End: 2020-05-27
Payer: COMMERCIAL

## 2020-05-27 DIAGNOSIS — S61.219D LACERATION OF FINGER OF RIGHT HAND WITHOUT FOREIGN BODY WITHOUT DAMAGE TO NAIL, UNSPECIFIED FINGER, SUBSEQUENT ENCOUNTER: ICD-10-CM

## 2020-05-27 PROCEDURE — 99212 OFFICE O/P EST SF 10 MIN: CPT | Performed by: NURSE PRACTITIONER

## 2020-05-27 ASSESSMENT — ENCOUNTER SYMPTOMS
CONSTITUTIONAL NEGATIVE: 1
RESPIRATORY NEGATIVE: 1
PSYCHIATRIC NEGATIVE: 1
NEUROLOGICAL NEGATIVE: 1
CARDIOVASCULAR NEGATIVE: 1
MYALGIAS: 0
MUSCULOSKELETAL NEGATIVE: 1

## 2020-05-27 NOTE — LETTER
[facility]  [address], [address] [place] [address]  Phone:  [phone] - Fax:  [phone]   [facility] Progress Report and Disability Certification  Date of Service: 5/27/2020   No Show:  No  Date / Time of Next Visit:   Discharged/MMI   Released to Full Duty   Claim Information   Patient Name: Sanna Yuen  Claim Number:     Employer: [employer]  Date of Injury: 5/16/2020     Insurer / TPA: [employer]  ID / SSN:     Occupation: [employer]  Diagnosis: The encounter diagnosis was Laceration of finger of right hand without foreign body without damage to nail, unspecified finger, subsequent encounter.    Medical Information   Related to Industrial Injury? Yes    Subjective Complaints:  DOI 5/16/20: Sanna Yuen is a 49 y.o. 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  Her tetanus shot is up-to-date.  Bleeding was controlled prior to arrival.  She has no history of easy bleeding or bruising.  She has not had any recent illness including fevers or chills or cough or shortness of breath.  She has no pain to anywhere other than her fingers, which has a mild sharp pain to the affected lacerations.  The pain is nonradiating.  It is made worse with palpation.  It is not relieved by anything.  The patient irrigated the wounds and wash with soap and water prior to arrival.     Today patient overall improved. Pain is no longer intermittent, achy, and tender. Patient denies numbness, tingling, or signs and symptoms of infection. Patient states that she did have a little bloody discharge, but no other symptoms. Patient states that Steristrips fell off without incident. Patient has not been taking anything for pain. Patient has been tolerating light duty and keeping her fingers covered at work.  Patient will be released to full duty at this time. Plan of care discussed with patient.     Objective Findings: Right fingers middle and pointer: Very shallow well-healed, just barely subcutaneous clean linear lacerations to the tips of the right middle and pointer fingers. Edges are well approximated. Neg edema, erythema, discharge, or warmth noted. Brisk cap refill. Distal sensation and neurovascular intact.   Pre-Existing Condition(s):     Assessment:   Condition Improved    Status: Discharged /  MMI  Permanent Disability:No    Plan:      Diagnostics:      Comments:  Discharged/MMI  Released to Full Duty   Follow-up as needed     Disability Information   Status: Released to Full Duty    From:  5/27/2020  Through:   Restrictions are:     Physical Restrictions   Sitting:    Standing:    Stooping:    Bending:      Squatting:    Walking:    Climbing:    Pushing:      Pulling:    Other:    Reaching Above Shoulder (L):   Reaching Above Shoulder (R):       Reaching Below Shoulder (L):    Reaching Below Shoulder (R):      Not to exceed Weight Limits   Carrying(hrs):   Weight Limit(lb):   Lifting(hrs):   Weight  Limit(lb):     Comments:      Repetitive Actions   Hands: i.e. Fine Manipulations from Grasping:     Feet: i.e. Operating Foot Controls:     Driving / Operate Machinery:     Physician Name: NIMA Lopez Physician Signature:   e-Signature: Dr. Nilo Nguyễn, Medical Director   Clinic Name / Location: [address], [address] [place] [address] Clinic Phone Number: Dept: [phone]   Appointment Time: 3:45 Pm Visit Start Time: 3:46 PM   Check-In Time:  3:41 Pm Visit Discharge Time:  4:01 pm    Original-Treating Physician or Chiropractor    Page 2-Insurer/TPA    Page 3-Employer    Page 4-Employee

## 2020-05-27 NOTE — PROGRESS NOTES
Subjective:      Sanna Yuen is a 49 y.o. female who presents with Other      DOI 5/16/20: Sanna Yuen is a 49 y.o. 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  Her tetanus shot is up-to-date.  Bleeding was controlled prior to arrival.  She has no history of easy bleeding or bruising.  She has not had any recent illness including fevers or chills or cough or shortness of breath.  She has no pain to anywhere other than her fingers, which has a mild sharp pain to the affected lacerations.  The pain is nonradiating.  It is made worse with palpation.  It is not relieved by anything.  The patient irrigated the wounds and wash with soap and water prior to arrival.     Today patient overall improved. Pain is no longer intermittent, achy, and tender. Patient denies numbness, tingling, or signs and symptoms of infection. Patient states that she did have a little bloody discharge, but no other symptoms. Patient states that Steristrips fell off without incident. Patient has not been taking anything for pain. Patient has been tolerating light duty and keeping her fingers covered at work. Patient will be released to full duty at this time. Plan of care discussed with patient.       HPI    Review of Systems   Constitutional: Negative.    Respiratory: Negative.    Cardiovascular: Negative.    Musculoskeletal: Negative.  Negative for myalgias.   Skin:        Healed very shallow well-healed linear lacerations   Neurological: Negative.    Psychiatric/Behavioral: Negative.         SOCHX: Works as a [employer]-worker at [employer]  FH: No pertinent family history to this problem.   Objective:     There were no vitals taken for this visit.     Physical Exam  Constitutional:       General: She is not in acute distress.     Appearance: Normal appearance. She is not  ill-appearing.   Cardiovascular:      Pulses: Normal pulses.   Pulmonary:      Effort: Pulmonary effort is normal.   Musculoskeletal: Normal range of motion.         General: No swelling, tenderness, deformity or signs of injury.   Skin:     General: Skin is warm and dry.      Capillary Refill: Capillary refill takes less than 2 seconds.      Findings: No bruising or erythema.   Neurological:      General: No focal deficit present.      Mental Status: She is alert and oriented to person, place, and time.      Cranial Nerves: No cranial nerve deficit.      Gait: Gait normal.   Psychiatric:         Mood and Affect: Mood normal.         Behavior: Behavior normal.         Right fingers middle and pointer: Very shallow well-healed, just barely subcutaneous clean linear lacerations to the tips of the right middle and pointer fingers. Edges are well approximated. Neg edema, erythema, discharge, or warmth noted. Brisk cap refill. Distal sensation and neurovascular intact.       Assessment/Plan:       1. Laceration of finger of right hand without foreign body without damage to nail, unspecified finger, subsequent encounter      Discharged/MMI  Released to Full Duty   Follow-up as needed

## 2020-06-02 NOTE — DISCHARGE PLANNING
note:  Pt called in have part of medical records changed but then she realized it was correct. (right hand injury) so she was satisfied and grateful after talking to CM.

## 2021-06-23 ENCOUNTER — HOSPITAL ENCOUNTER (OUTPATIENT)
Dept: LAB | Facility: MEDICAL CENTER | Age: 51
End: 2021-06-23
Attending: FAMILY MEDICINE
Payer: COMMERCIAL

## 2021-06-23 LAB
ALBUMIN SERPL BCP-MCNC: 4.3 G/DL (ref 3.2–4.9)
ALBUMIN/GLOB SERPL: 1.4 G/DL
ALP SERPL-CCNC: 42 U/L (ref 30–99)
ALT SERPL-CCNC: 22 U/L (ref 2–50)
ANION GAP SERPL CALC-SCNC: 13 MMOL/L (ref 7–16)
AST SERPL-CCNC: 26 U/L (ref 12–45)
BASOPHILS # BLD AUTO: 1.3 % (ref 0–1.8)
BASOPHILS # BLD: 0.07 K/UL (ref 0–0.12)
BILIRUB SERPL-MCNC: 0.2 MG/DL (ref 0.1–1.5)
BUN SERPL-MCNC: 18 MG/DL (ref 8–22)
CALCIUM SERPL-MCNC: 9.7 MG/DL (ref 8.5–10.5)
CHLORIDE SERPL-SCNC: 103 MMOL/L (ref 96–112)
CHOLEST SERPL-MCNC: 263 MG/DL (ref 100–199)
CO2 SERPL-SCNC: 22 MMOL/L (ref 20–33)
CREAT SERPL-MCNC: 0.57 MG/DL (ref 0.5–1.4)
EOSINOPHIL # BLD AUTO: 0.16 K/UL (ref 0–0.51)
EOSINOPHIL NFR BLD: 2.9 % (ref 0–6.9)
ERYTHROCYTE [DISTWIDTH] IN BLOOD BY AUTOMATED COUNT: 58.6 FL (ref 35.9–50)
FASTING STATUS PATIENT QL REPORTED: NORMAL
GLOBULIN SER CALC-MCNC: 3.1 G/DL (ref 1.9–3.5)
GLUCOSE SERPL-MCNC: 174 MG/DL (ref 65–99)
HCT VFR BLD AUTO: 47.3 % (ref 37–47)
HDLC SERPL-MCNC: 42 MG/DL
HGB BLD-MCNC: 14.8 G/DL (ref 12–16)
IMM GRANULOCYTES # BLD AUTO: 0.02 K/UL (ref 0–0.11)
IMM GRANULOCYTES NFR BLD AUTO: 0.4 % (ref 0–0.9)
LDLC SERPL CALC-MCNC: 156 MG/DL
LYMPHOCYTES # BLD AUTO: 2 K/UL (ref 1–4.8)
LYMPHOCYTES NFR BLD: 36.3 % (ref 22–41)
MCH RBC QN AUTO: 28 PG (ref 27–33)
MCHC RBC AUTO-ENTMCNC: 31.3 G/DL (ref 33.6–35)
MCV RBC AUTO: 89.4 FL (ref 81.4–97.8)
MONOCYTES # BLD AUTO: 0.47 K/UL (ref 0–0.85)
MONOCYTES NFR BLD AUTO: 8.5 % (ref 0–13.4)
NEUTROPHILS # BLD AUTO: 2.79 K/UL (ref 2–7.15)
NEUTROPHILS NFR BLD: 50.6 % (ref 44–72)
NRBC # BLD AUTO: 0 K/UL
NRBC BLD-RTO: 0 /100 WBC
PLATELET # BLD AUTO: 184 K/UL (ref 164–446)
PMV BLD AUTO: 12.7 FL (ref 9–12.9)
POTASSIUM SERPL-SCNC: 4.5 MMOL/L (ref 3.6–5.5)
PROT SERPL-MCNC: 7.4 G/DL (ref 6–8.2)
RBC # BLD AUTO: 5.29 M/UL (ref 4.2–5.4)
SODIUM SERPL-SCNC: 138 MMOL/L (ref 135–145)
TRIGL SERPL-MCNC: 324 MG/DL (ref 0–149)
WBC # BLD AUTO: 5.5 K/UL (ref 4.8–10.8)

## 2021-06-23 PROCEDURE — 85025 COMPLETE CBC W/AUTO DIFF WBC: CPT

## 2021-06-23 PROCEDURE — 80053 COMPREHEN METABOLIC PANEL: CPT

## 2021-06-23 PROCEDURE — 36415 COLL VENOUS BLD VENIPUNCTURE: CPT

## 2021-06-23 PROCEDURE — 80061 LIPID PANEL: CPT

## 2021-07-02 ENCOUNTER — TELEPHONE (OUTPATIENT)
Dept: SCHEDULING | Facility: IMAGING CENTER | Age: 51
End: 2021-07-02

## 2021-07-08 NOTE — TELEPHONE ENCOUNTER
Patient has an upcoming appointment and records need to be obtained prior to the visit. Please reference appointment notes for the name of the entity.     Routing comment      Records requested

## 2021-07-26 ENCOUNTER — TELEPHONE (OUTPATIENT)
Dept: MEDICAL GROUP | Facility: PHYSICIAN GROUP | Age: 51
End: 2021-07-26

## 2021-07-26 RX ORDER — CETIRIZINE HYDROCHLORIDE 10 MG/1
10 TABLET ORAL DAILY
COMMUNITY

## 2021-07-26 RX ORDER — NAPROXEN 500 MG/1
TABLET ORAL PRN
COMMUNITY
End: 2021-11-01

## 2021-07-26 NOTE — TELEPHONE ENCOUNTER
Future Appointments       Provider Department [place]    7/28/2021 4:30 PM NIMA [facility]        NEW PATIENT VISIT PRE-VISIT PLANNING    1.  [employer] Patient is checked in Patient Demographics?Yes    2.  Immunizations were updated in Epic using Reconcile Outside Information activity? Yes         3.  Is this appointment scheduled as a Hospital Follow-Up? No    4.  Patient is due for the following Health Maintenance Topics:   Health Maintenance Due   Topic Date Due   • PAP SMEAR  Never done   • MAMMOGRAM  07/30/2013   • COLONOSCOPY  Never done   • IMM ZOSTER VACCINES (1 of 2) Never done     5.  Reviewed/Updated the following with patient:       •   Preferred Pharmacy? Yes       •   Preferred Lab? Yes       •   Preferred Communication? Yes       •   Allergies? Yes       •   Medications? YES. Was Abstract Encounter opened and chart updated? YES       •   Social History? Yes       •   Family History (document living status of immediate family members and if + hx of  cancer, diabetes, hypertension, hyperlipidemia, heart attack, stroke) Yes    6.  Updated Care Team?       •   DME Company (gait device, O2, CPAP, etc.) NO       •   Other Specialists (eye doctor, derm, GYN, cardiology, endo, etc): NO    7.  AHA (Puls[employer]) form printed for Provider? N/A  Left message for patient to call back regarding pre-visit planning. Please transfer call to [phone].  Patient advised to arrive 15 minutes prior to scheduled appointment

## 2021-07-28 ENCOUNTER — OFFICE VISIT (OUTPATIENT)
Dept: MEDICAL GROUP | Facility: PHYSICIAN GROUP | Age: 51
End: 2021-07-28
Payer: COMMERCIAL

## 2021-07-28 ENCOUNTER — HOSPITAL ENCOUNTER (OUTPATIENT)
Dept: RADIOLOGY | Facility: MEDICAL CENTER | Age: 51
End: 2021-07-28
Attending: NURSE PRACTITIONER
Payer: COMMERCIAL

## 2021-07-28 VITALS
HEART RATE: 82 BPM | TEMPERATURE: 97.9 F | WEIGHT: 185 LBS | HEIGHT: 65 IN | BODY MASS INDEX: 30.82 KG/M2 | SYSTOLIC BLOOD PRESSURE: 130 MMHG | DIASTOLIC BLOOD PRESSURE: 78 MMHG | OXYGEN SATURATION: 97 %

## 2021-07-28 DIAGNOSIS — Z12.11 ENCOUNTER FOR SCREENING FECAL OCCULT BLOOD TESTING: ICD-10-CM

## 2021-07-28 DIAGNOSIS — E66.09 CLASS 1 OBESITY DUE TO EXCESS CALORIES WITH SERIOUS COMORBIDITY AND BODY MASS INDEX (BMI) OF 31.0 TO 31.9 IN ADULT: ICD-10-CM

## 2021-07-28 DIAGNOSIS — R73.9 ELEVATED BLOOD SUGAR: ICD-10-CM

## 2021-07-28 DIAGNOSIS — N95.1 HOT FLASHES DUE TO MENOPAUSE: ICD-10-CM

## 2021-07-28 DIAGNOSIS — F17.210 CIGARETTE SMOKER ONE HALF PACK A DAY OR LESS: ICD-10-CM

## 2021-07-28 DIAGNOSIS — S89.91XS INJURY OF RIGHT LOWER EXTREMITY, SEQUELA: ICD-10-CM

## 2021-07-28 DIAGNOSIS — I10 ESSENTIAL HYPERTENSION: ICD-10-CM

## 2021-07-28 DIAGNOSIS — E78.2 MIXED DYSLIPIDEMIA: ICD-10-CM

## 2021-07-28 DIAGNOSIS — E11.9 TYPE 2 DIABETES MELLITUS WITHOUT COMPLICATION, WITHOUT LONG-TERM CURRENT USE OF INSULIN (HCC): ICD-10-CM

## 2021-07-28 PROBLEM — E11.65 TYPE 2 DIABETES MELLITUS WITH HYPERGLYCEMIA, WITHOUT LONG-TERM CURRENT USE OF INSULIN (HCC): Status: ACTIVE | Noted: 2021-07-28

## 2021-07-28 PROBLEM — E66.811 CLASS 1 OBESITY DUE TO EXCESS CALORIES WITH SERIOUS COMORBIDITY AND BODY MASS INDEX (BMI) OF 31.0 TO 31.9 IN ADULT: Status: ACTIVE | Noted: 2021-07-28

## 2021-07-28 PROBLEM — S89.91XA RIGHT LEG INJURY: Status: ACTIVE | Noted: 2021-07-28

## 2021-07-28 PROCEDURE — 99214 OFFICE O/P EST MOD 30 MIN: CPT | Performed by: NURSE PRACTITIONER

## 2021-07-28 PROCEDURE — 73552 X-RAY EXAM OF FEMUR 2/>: CPT | Mod: RT

## 2021-07-28 RX ORDER — AMLODIPINE BESYLATE 10 MG/1
10 TABLET ORAL DAILY
Qty: 90 TABLET | Refills: 3 | Status: SHIPPED | OUTPATIENT
Start: 2021-07-28 | End: 2022-07-27 | Stop reason: SDUPTHER

## 2021-07-28 RX ORDER — FENOFIBRATE 145 MG/1
145 TABLET, COATED ORAL DAILY
Qty: 90 TABLET | Refills: 3 | Status: SHIPPED | OUTPATIENT
Start: 2021-07-28 | End: 2022-08-02 | Stop reason: SDUPTHER

## 2021-07-28 RX ORDER — SIMVASTATIN 20 MG
20 TABLET ORAL EVERY EVENING
Qty: 30 TABLET | Refills: 11 | Status: SHIPPED | OUTPATIENT
Start: 2021-07-28 | End: 2022-07-13 | Stop reason: SDUPTHER

## 2021-07-28 RX ORDER — ATENOLOL 50 MG/1
50 TABLET ORAL DAILY
Qty: 90 TABLET | Refills: 3 | Status: SHIPPED | OUTPATIENT
Start: 2021-07-28 | End: 2022-08-01 | Stop reason: SDUPTHER

## 2021-07-28 ASSESSMENT — FIBROSIS 4 INDEX: FIB4 SCORE: 1.54

## 2021-07-28 ASSESSMENT — PATIENT HEALTH QUESTIONNAIRE - PHQ9: CLINICAL INTERPRETATION OF PHQ2 SCORE: 0

## 2021-07-28 NOTE — ASSESSMENT & PLAN NOTE
Results for MOOSE GODINEZ (MRN [id number]) as of 7/28/2021 16:23   Ref. Range 6/23/2021 07:23   Cholesterol,Tot Latest Ref Range: 100 - 199 mg/dL 263 (H)   Triglycerides Latest Ref Range: 0 - 149 mg/dL 324 (H)   HDL Latest Ref Range: >=40 mg/dL 42   LDL Latest Ref Range: <100 mg/dL 156 (H)

## 2021-07-28 NOTE — ASSESSMENT & PLAN NOTE
Results for MOOSE GODINEZ (MRN [id number]) as of 7/28/2021 16:23   Ref. Range 6/23/2021 07:23   Glucose Latest Ref Range: 65 - 99 mg/dL 174 (H)   No A1c ordered.  Taking metformin 1000 bid, januvia . Not currently on statin due to muscle aches on lipitor.  On amlodipine and atenolol, no ace/arb  Not currently exercising.

## 2021-07-28 NOTE — ASSESSMENT & PLAN NOTE
Fell at work on pallet. May 2019.  Workers comp.  Saw workers comp at [facility].  Did PT for 3 months.  Still having pain.  Reports pain thigh radiating ajith to lower leg.  No swelling.  No knee swelling.  Ice, heat, naprosyn does not help.  Difficulty with stairs.   No xray done by workers comp two years ago.  Wants MRI.  Will xray first.

## 2021-07-28 NOTE — ASSESSMENT & PLAN NOTE
Taking amlodipine and atenolol .  bp at goal today.  No chest pain reported. Not currently exercising.  Daily smoker 1/2 ppd X 10 years

## 2021-07-29 ENCOUNTER — PATIENT MESSAGE (OUTPATIENT)
Dept: MEDICAL GROUP | Facility: PHYSICIAN GROUP | Age: 51
End: 2021-07-29

## 2021-07-29 NOTE — PROGRESS NOTES
Sanna Godinez is a 50 y.o. female here today to establish care and for evaluation and management of:    HPI:      Type 2 diabetes mellitus with hyperglycemia, without long-term current use of insulin (HCC)  Results for SANNA GODINEZ (MRN [id number]) as of 7/28/2021 16:23   Ref. Range 6/23/2021 07:23   Glucose Latest Ref Range: 65 - 99 mg/dL 174 (H)   No A1c ordered.  Taking metformin 1000 bid, januvia . Not currently on statin due to muscle aches on lipitor.  On amlodipine and atenolol, no ace/arb  Not currently exercising.      Mixed dyslipidemia  Results for SANNA GODINEZ (MRN [id number]) as of 7/28/2021 16:23   Ref. Range 6/23/2021 07:23   Cholesterol,Tot Latest Ref Range: 100 - 199 mg/dL 263 (H)   Triglycerides Latest Ref Range: 0 - 149 mg/dL 324 (H)   HDL Latest Ref Range: >=40 mg/dL 42   LDL Latest Ref Range: <100 mg/dL 156 (H)       Essential hypertension  Taking amlodipine and atenolol .  bp at goal today.  No chest pain reported. Not currently exercising.  Daily smoker 1/2 ppd X 10 years    Hot flashes due to menopause  Day and night hot flashes.  No period for 7 months.     Right leg injury  Fell at work on pallet. May 2019.  Workers comp.  Saw workers comp at [facility].  Did PT for 3 months.  Still having pain.  Reports pain thigh radiating ajith to lower leg.  No swelling.  No knee swelling.  Ice, heat, naprosyn does not help.  Difficulty with stairs.   No xray done by workers comp two years ago.  Wants MRI.  Will xray first.     Class 1 obesity due to excess calories with serious comorbidity and body mass index (BMI) of 31.0 to 31.9 in adult  BMI 31.2.  Diabetic, dyslipidemia.  Not currently exercising.     Cigarette smoker one half pack a day or less  Smoking 1/2 ppd X 10 years. Not interested in quitting.       Current medicines (including changes today)  Current Outpatient Medications   Medication Sig Dispense Refill   • simvastatin (ZOCOR) 20 MG Tab Take 1 tablet by mouth every  evening. 30 tablet 11   • metformin (GLUCOPHAGE) 1000 MG tablet Take 1 tablet by mouth 2 times a day with meals. 180 tablet 3   • SITagliptin (JANUVIA) 100 MG Tab Take 1 tablet by mouth every day. 90 tablet 3   • fenofibrate (TRICOR) 145 MG Tab Take 1 tablet by mouth every day. 90 tablet 3   • atenolol (TENORMIN) 50 MG Tab Take 1 tablet by mouth every day. 90 tablet 3   • amLODIPine (NORVASC) 10 MG Tab Take 1 tablet by mouth every day. 90 tablet 3   • cetirizine (KLS ALLER-CIPRIANO) 10 MG Tab Take 10 mg by mouth every day.     • naproxen (EC NAPROSYN) 500 MG EC tablet Take  by mouth as needed.     • Multiple Vitamins-Minerals (MULTIVITAMIN ADULT PO) Take  by mouth.     • Multiple Vitamins-Minerals (EYE VITAMINS PO) Take  by mouth.     • metFORMIN (GLUCOPHAGE) 500 MG Tab Take 500 mg by mouth 2 times a day, with meals.       No current facility-administered medications for this visit.       She  has a past medical history of Arthritis, Diabetes (HCC), Hyperlipidemia, and Hypertension. She also has no past medical history of Breast cancer (HCC).    She  has a past surgical history that includes primary c section; tubal coagulation laparoscopic bilateral; and cholecystectomy.    Social History     Tobacco Use   • Smoking status: Current Every Day Smoker     Packs/day: 0.50     Years: 10.00     Pack years: 5.00     Types: Cigarettes   • Smokeless tobacco: Never Used   Vaping Use   • Vaping Use: Never used   Substance Use Topics   • Alcohol use: Yes     Comment: 3-5 times a year one drink   • Drug use: Never       Social History     Social History Narrative   • Not on file       Family History   Problem Relation Age of Onset   • Diabetes Sister    • Hypertension Sister    • Thyroid Sister    • Cancer Mother         skin cancer   • Hypertension Mother    • Thyroid Mother    • Hypertension Father    • Thyroid Father    • Hyperlipidemia Father    • Glaucoma Maternal Grandmother    • Cancer Paternal Grandfather         throat  "cancer   • Diabetes Sister    • Diabetes Son        Family Status   Relation Name Status   • Leana baldwin Alive   • Mo  Alive   • Fa  Alive   • MGMo          macular degeneration   • MGFa     • PGMo     • PGFa     • Leana molly Alive        low blood pressure, vitiligo   • Sujatha  Alive   • Son  Alive        celiac disease         ROS  As stated in hpi  All other systems reviewed and are negative     Objective:     /78 (BP Location: Left arm, Patient Position: Sitting)   Pulse 82   Temp 36.6 °C (97.9 °F) (Temporal)   Ht 1.64 m (5' 4.57\")   Wt 83.9 kg (185 lb)   SpO2 97%  Body mass index is 31.2 kg/m².  Physical Exam:    Constitutional: Alert, no distress.  Skin: Warm, dry, good turgor, no rashes in visible areas.  Eye: Equal, round and reactive, conjunctiva clear, lids normal.    Neck: Trachea midline, no masses, no thyromegaly. No cervical or supraclavicular lymphadenopathy.  Respiratory: Unlabored respiratory effort  Psych: Alert and oriented x3, normal affect and mood.  MSK:  Left upper leg with described pain in hip and radiates to lower leg.  No swelling, bruising or deformity noted      Assessment and Plan:   The following treatment plan was discussed    1. Type 2 diabetes mellitus without complication, without long-term current use of insulin (HCC)  Chronic, ongoing, poor control.  A1c ordered as her former primary had not checked A1c for over a year.  Refills today on all meds.  Return in 2 months with new labs and discussion regarding diabetes. Would recommend ace/ or arb. Restart statin with CoQ10 Encouraged weight loss.   - HEMOGLOBIN A1C; Future    2. Essential hypertension  Chronic, ongoing, controlled.  Will discuss switching to Ace/Arb for kidney protection.  Monitor and follow.     3. Elevated blood sugar  See #1    4. Mixed dyslipidemia  Chronic, ongoing, uncontrolled.  Restart statin, simvastatin 20 mg started.  Advised to take with CoQ10.  Recheck labs in 3 " months.  Monitor and follow.     5. Hot flashes due to menopause  Chronic, ongoing.  Perimenopausal with 7 months without a period.  Advised decrease caffeine, black cohash and exercise/weight loss.  Monitor.     6. Injury of right lower extremity, sequela  Chronic issue.  Closed workers comp case from work injury.  Xray ordered, possible MRI.  Monitor and follow.   - MR-FEMUR-W/O RIGHT; Future  - DX-FEMUR-2+ RIGHT; Future    7. Encounter for screening fecal occult blood testing  Due for screening.  Kit ordered.  Monitor and follow.   - OCCULT BLOOD FECES IMMUNOASSAY; Future    8. Class 1 obesity due to excess calories with serious comorbidity and body mass index (BMI) of 31.0 to 31.9 in adult  Chronic, ongoing. BMI 31.2.  Advised lifestyle changes to decrease weight.  Monitor and follow.     9. Cigarette smoker one half pack a day or less  Chronic, ongoing, not interested in quitting.  Advised to quit smoking, especially with family cardiac disease and her diabetes.  Monitor and follow.       Records requested.  Followup: No follow-ups on file.         Educated in proper administration of medication(s) ordered today including safety, possible SE, risks, benefits, rationale and alternatives to therapy.     Please note that this dictation was created using voice recognition software. I have made every reasonable attempt to correct obvious errors, but I expect that there are errors of grammar and possibly content that I did not discover before finalizing the note.

## 2021-08-20 ENCOUNTER — HOSPITAL ENCOUNTER (OUTPATIENT)
Dept: RADIOLOGY | Facility: MEDICAL CENTER | Age: 51
End: 2021-08-20
Attending: NURSE PRACTITIONER
Payer: COMMERCIAL

## 2021-08-20 DIAGNOSIS — S89.91XS INJURY OF RIGHT LOWER EXTREMITY, SEQUELA: ICD-10-CM

## 2021-08-20 PROCEDURE — A9270 NON-COVERED ITEM OR SERVICE: HCPCS | Performed by: RADIOLOGY

## 2021-08-20 PROCEDURE — 700102 HCHG RX REV CODE 250 W/ 637 OVERRIDE(OP): Performed by: RADIOLOGY

## 2021-08-20 PROCEDURE — 73718 MRI LOWER EXTREMITY W/O DYE: CPT | Mod: RT

## 2021-08-20 RX ORDER — DIAZEPAM 5 MG/1
10 TABLET ORAL
Status: COMPLETED | OUTPATIENT
Start: 2021-08-20 | End: 2021-08-20

## 2021-08-20 RX ADMIN — DIAZEPAM 10 MG: 5 TABLET ORAL at 10:30

## 2021-08-20 NOTE — DISCHARGE INSTRUCTIONS
MRI ADULT DISCHARGE INSTRUCTIONS    You have been medicated today for your scan. Please follow the instructions below to ensure your safe recovery. If you have any questions or problems, feel free to call us at [phone] or [phone].     1.   Have someone stay with you to assist you as needed.    2.   Do not drive or operate any mechanical devices.    3.   Do not perform any activity that requires concentration. Make no major decisions over the next 24 hours.     4.   Be careful changing positions from laying down to sitting or standing, as you may become dizzy.     5.   Do not drink alcohol for 48 hours.    6.   There are no restrictions for eating your normal meals. Drink fluids.    7.   You may continue your usual medications for pain, tranquilizers, muscle relaxants or sedatives when awake.     8.   Tomorrow, you may continue your normal daily activities.     9.   Pressure dressing on 10 - 15 minutes. If swelling or bleeding occurs when removed, continue placing direct pressure on injection site for another 5 minutes, or until bleeding stops.     I have been informed of and understand the above discharge instructions. Diazepam (VALIUM) Oral solution  What is this medicine?  You were prescribed DIAZEPAM (dye AZ e cornelio) for the procedure you had today. This medication is a benzodiazepine. It is used to treat anxiety and nervousness. It also can help treat alcohol withdrawal, relax muscles, and treat certain types of seizures.  This medicine may be used for other purposes; ask your health care provider or pharmacist if you have questions.  What side effects may I notice from receiving this medicine?  Side effects that you should report to your doctor or health care professional as soon as possible:  • allergic reactions like skin rash, itching or hives, swelling of the face, lips, or tongue  • angry, confused, depressed, other mood changes  • breathing problems  • feeling faint or lightheaded, falls  • muscle  cramps  • problems with balance, talking, walking  • restlessness  • tremors  • trouble passing urine or change in the amount of urine  • unusually weak or tired  Side effects that usually do not require medical attention (report to your doctor or health care professional if they continue or are bothersome):  • difficulty sleeping, nightmares  • dizziness, drowsiness, clumsiness, or unsteadiness, a hangover effect  • headache  • nausea, vomiting  This list may not describe all possible side effects. Call your doctor for medical advice about side effects. You may report side effects to FDA at 4-451-DFR-7110.

## 2021-08-22 DIAGNOSIS — M16.51 POST-TRAUMATIC OSTEOARTHRITIS OF RIGHT HIP: ICD-10-CM

## 2021-09-01 ENCOUNTER — HOSPITAL ENCOUNTER (OUTPATIENT)
Dept: LAB | Facility: MEDICAL CENTER | Age: 51
End: 2021-09-01
Attending: NURSE PRACTITIONER
Payer: COMMERCIAL

## 2021-09-01 DIAGNOSIS — E11.9 TYPE 2 DIABETES MELLITUS WITHOUT COMPLICATION, WITHOUT LONG-TERM CURRENT USE OF INSULIN (HCC): ICD-10-CM

## 2021-09-01 LAB
EST. AVERAGE GLUCOSE BLD GHB EST-MCNC: 209 MG/DL
HBA1C MFR BLD: 8.9 % (ref 4–5.6)

## 2021-09-01 PROCEDURE — 83036 HEMOGLOBIN GLYCOSYLATED A1C: CPT

## 2021-09-01 PROCEDURE — 36415 COLL VENOUS BLD VENIPUNCTURE: CPT

## 2021-09-13 PROBLEM — M16.9 OSTEOARTHRITIS OF HIP: Status: ACTIVE | Noted: 2021-09-13

## 2021-09-30 ENCOUNTER — OFFICE VISIT (OUTPATIENT)
Dept: MEDICAL GROUP | Facility: PHYSICIAN GROUP | Age: 51
End: 2021-09-30
Payer: COMMERCIAL

## 2021-09-30 VITALS
RESPIRATION RATE: 18 BRPM | HEIGHT: 62 IN | OXYGEN SATURATION: 98 % | TEMPERATURE: 97.7 F | HEART RATE: 82 BPM | BODY MASS INDEX: 33.49 KG/M2 | SYSTOLIC BLOOD PRESSURE: 126 MMHG | DIASTOLIC BLOOD PRESSURE: 82 MMHG | WEIGHT: 182 LBS

## 2021-09-30 DIAGNOSIS — I10 ESSENTIAL HYPERTENSION: ICD-10-CM

## 2021-09-30 DIAGNOSIS — E11.65 TYPE 2 DIABETES MELLITUS WITH HYPERGLYCEMIA, WITHOUT LONG-TERM CURRENT USE OF INSULIN (HCC): ICD-10-CM

## 2021-09-30 DIAGNOSIS — M16.11 PRIMARY OSTEOARTHRITIS OF RIGHT HIP: ICD-10-CM

## 2021-09-30 PROCEDURE — 99213 OFFICE O/P EST LOW 20 MIN: CPT | Performed by: NURSE PRACTITIONER

## 2021-09-30 RX ORDER — LOSARTAN POTASSIUM 25 MG/1
25 TABLET ORAL DAILY
Qty: 30 TABLET | Refills: 3 | Status: SHIPPED | OUTPATIENT
Start: 2021-09-30 | End: 2022-01-28 | Stop reason: SDUPTHER

## 2021-09-30 ASSESSMENT — FIBROSIS 4 INDEX: FIB4 SCORE: 1.54

## 2021-09-30 NOTE — ASSESSMENT & PLAN NOTE
Severe osteoarthritis of right hip.  Consulted with Dr. Gaytan, unable to proceed at this time due to elevated A1c 9.4

## 2021-09-30 NOTE — PROGRESS NOTES
Chief Complaint   Patient presents with   • Follow-Up   • Hip Pain   • Diabetes       Subjective:   Sanna Yuen is a 50 y.o. female here today for evaluation and management of:    Type 2 diabetes mellitus with hyperglycemia, without long-term current use of insulin ([facility])  Recent A1c at 9.4. taking metformin and januvia.  Will refer to pharmacy as she is trying to get her hip surgery done.  My plate handout regarding food/diet given to her to work on.  Down 2 pounds.     Essential hypertension  bp remains slightly elevated diastolic.  Will add low dose losartan 25 mg to bring that down and cover kidney protection from her diabetes.  Return in 4 weeks.  No chest pain, sob or headache reported.     Osteoarthritis of hip  Severe osteoarthritis of right hip.  Consulted with Dr. Gaytan, unable to proceed at this time due to elevated A1c 9.4           Current medicines (including changes today)  Current Outpatient Medications   Medication Sig Dispense Refill   • losartan (COZAAR) 25 MG Tab Take 1 Tablet by mouth every day. 30 Tablet 3   • simvastatin (ZOCOR) 20 MG Tab Take 1 tablet by mouth every evening. 30 tablet 11   • metformin (GLUCOPHAGE) 1000 MG tablet Take 1 tablet by mouth 2 times a day with meals. 180 tablet 3   • SITagliptin (JANUVIA) 100 MG Tab Take 1 tablet by mouth every day. 90 tablet 3   • fenofibrate (TRICOR) 145 MG Tab Take 1 tablet by mouth every day. 90 tablet 3   • atenolol (TENORMIN) 50 MG Tab Take 1 tablet by mouth every day. 90 tablet 3   • amLODIPine (NORVASC) 10 MG Tab Take 1 tablet by mouth every day. 90 tablet 3   • cetirizine (KLS ALLER-CIPRIANO) 10 MG Tab Take 10 mg by mouth every day.     • naproxen (EC NAPROSYN) 500 MG EC tablet Take  by mouth as needed.     • Multiple Vitamins-Minerals (MULTIVITAMIN ADULT PO) Take  by mouth.     • Multiple Vitamins-Minerals (EYE VITAMINS PO) Take  by mouth.       No current facility-administered medications for this visit.     She  has a past medical  "history of Arthritis, Diabetes (HCC), Hyperlipidemia, and Hypertension. She also has no past medical history of Breast cancer (HCC).    ROS as stated in hpi  No chest pain, no shortness of breath, no abdominal pain       Objective:     /82 (BP Location: Left arm, Patient Position: Sitting)   Pulse 82   Temp 36.5 °C (97.7 °F) (Temporal)   Resp 18   Ht 1.575 m (5' 2\")   Wt 82.6 kg (182 lb)   SpO2 98%  Body mass index is 33.29 kg/m². slightly elevated diastolic  Physical Exam:  Constitutional: Alert, no distress.  Skin: Warm, dry, good turgor,no cyanosis, no rashes in visible areas.  Eye: Equal, round and reactive, conjunctiva clear, lids normal.  Neck: Trachea midline, no masses, no obvious thyroid enlargement.. No cervical or supraclavicular lymphadenopathy. Range of motion within normal limits.  Neuro: Cranial nerves 2-12 grossly intact.  No sensory deficit.  Respiratory: Unlabored respiratory effort, Psych: Alert and oriented x3, normal affect and mood and judgement.        Assessment and Plan:   The following treatment plan was discussed    1. Type 2 diabetes mellitus with hyperglycemia, without long-term current use of insulin (HCC)  Chronic, ongoing, A1c has elevated.  Refer to pharmacy for work on medication controls.  Dietary discussion today. Handout provided regarding MY PLATE>  Return in 4-5 weeks.   - REFERRAL TO PHARMACOTHERAPY SERVICE    2. Essential hypertension  Chronic, ongoing, not at goal.  Add losartan 25 mg.  Return in 4-5 weeks.  Monitor and follow.     3. Primary osteoarthritis of right hip  Chronic, ongoing worsening.  Has plans to get right hip replaced, but has to get A1c down.  See above referral.  Monitor.       Followup: Return in about 4 weeks (around 10/28/2021) for HTN.         Educated in proper administration of medication(s) ordered today including safety, possible SE, risks, benefits, rationale and alternatives to therapy.     Please note that this dictation was created " using voice recognition software. I have made every reasonable attempt to correct obvious errors, but I expect that there are errors of grammar and possibly content that I did not discover before finalizing the note.

## 2021-09-30 NOTE — ASSESSMENT & PLAN NOTE
Recent A1c at 9.4. taking metformin and januvia.  Will refer to pharmacy as she is trying to get her hip surgery done.  My plate handout regarding food/diet given to her to work on.  Down 2 pounds.

## 2021-09-30 NOTE — ASSESSMENT & PLAN NOTE
bp remains slightly elevated diastolic.  Will add low dose losartan 25 mg to bring that down and cover kidney protection from her diabetes.  Return in 4 weeks.  No chest pain, sob or headache reported.

## 2021-10-04 ENCOUNTER — DOCUMENTATION (OUTPATIENT)
Dept: VASCULAR LAB | Facility: MEDICAL CENTER | Age: 51
End: 2021-10-04

## 2021-10-04 NOTE — PROGRESS NOTES
[facility]    Received pharmacotherapy referral for DM from Xin WEI on 9/30/21    Called and spoke with pt to establish care. NP appt scheduled 10/12/21 at 4:00 pm    Insurance: Ava HADLEY  PCP: [facility]  Locations to be seen: Any    [facility] at [phone], fax [phone]    Baron Lay, StephanieD

## 2021-10-12 ENCOUNTER — NON-PROVIDER VISIT (OUTPATIENT)
Dept: VASCULAR LAB | Facility: MEDICAL CENTER | Age: 51
End: 2021-10-12
Attending: INTERNAL MEDICINE
Payer: COMMERCIAL

## 2021-10-12 VITALS
BODY MASS INDEX: 33.86 KG/M2 | WEIGHT: 185.1 LBS | HEART RATE: 75 BPM | DIASTOLIC BLOOD PRESSURE: 79 MMHG | SYSTOLIC BLOOD PRESSURE: 127 MMHG

## 2021-10-12 PROCEDURE — 99213 OFFICE O/P EST LOW 20 MIN: CPT

## 2021-10-12 ASSESSMENT — FIBROSIS 4 INDEX: FIB4 SCORE: 1.54

## 2021-10-12 NOTE — PROGRESS NOTES
Patient Consult Note    Primary care physician: NIMA Frankel    Reason for consult: Management of Uncontrolled Type 2 Diabetes    HPI:  Sanna Yuen is a 50 y.o. old patient who comes in today for evaluation of above stated problem.    Most Recent HbA1c:   Lab Results   Component Value Date/Time    HBA1C 9.4 (H) 09/19/2021 09:00 PM    HBA1C 8.9 (H) 09/01/2021 02:30 PM      Lab Results   Component Value Date/Time    CREATININE 0.57 06/23/2021 07:23 AM              Diabetes Medication History and Current Regimen  Metformin: 1000mg BID     DPP-4 Inhibitor: Januvia 100mg QD    Pt has home glucometer and proper testing technique - yes    Pt reports blood sugars:   Before Breakfast: 190-200    Hypoglycemia awareness - discussed  Nocturnal hypoglycemia- denies   Hypoglycemia:  None    Pt's treatment of Hypoglycemia - juice  - 15:15 Rule    Current Exercise - moderate walking - with her current job she walks daily about 8000 steps but patient has a bad hip and needs a replacement. She is hoping to get her BG under control so she can have surgery.  Exercise Goal - Try to reach goal of 150min/week of movement/walking    Dietary - Common Adult    Pt reports eating:  Breakfast - fig bar, coffee, diet soda  Snack - nonfat yogurt, sugar free pudding, diet soda  Lunch - peanut butter jelly sandwich with wheat bread  Snack -   Dinner - chicken breast, can peaches  Snack -       Preventative Management  BP regimen (ACE/ARB) - Losartan 25mg QD  ASA - n/a  Statin - Simvastatin 20mg QD  Last Retinal Scan - Annually -due 2022  Last Foot Exam - annually - due 2022  Last A1c -   Lab Results   Component Value Date/Time    HBA1C 9.4 (H) 09/19/2021 09:00 PM    HBA1C 8.9 (H) 09/01/2021 02:30 PM      Past Medical History:  Patient Active Problem List    Diagnosis Date Noted   • Osteoarthritis of hip 09/13/2021   • Type 2 diabetes mellitus with hyperglycemia, without long-term current use of insulin (HCC) 07/28/2021   •  Essential hypertension 07/28/2021   • Mixed dyslipidemia 07/28/2021   • Hot flashes due to menopause 07/28/2021   • Right leg injury 07/28/2021   • Class 1 obesity due to excess calories with serious comorbidity and body mass index (BMI) of 31.0 to 31.9 in adult 07/28/2021   • Cigarette smoker one half pack a day or less 07/28/2021       Past Surgical History:  Past Surgical History:   Procedure Laterality Date   • CHOLECYSTECTOMY     • PRIMARY C SECTION     • TUBAL COAGULATION LAPAROSCOPIC BILATERAL         Allergies:  Lipitor [atorvastatin], Niacin, and Penicillins    Social History:  Social History     Socioeconomic History   • Marital status: Single     Spouse name: Not on file   • Number of children: Not on file   • Years of education: Not on file   • Highest education level: Not on file   Occupational History   • Not on file   Tobacco Use   • Smoking status: Current Every Day Smoker     Packs/day: 0.50     Years: 10.00     Pack years: 5.00     Types: Cigarettes   • Smokeless tobacco: Never Used   Vaping Use   • Vaping Use: Never used   Substance and Sexual Activity   • Alcohol use: Yes     Comment: 3-5 times a year one drink   • Drug use: Never   • Sexual activity: Yes     Partners: Male     Birth control/protection: Surgical   Other Topics Concern   • Not on file   Social History Narrative   • Not on file     Social Determinants of Health     Financial Resource Strain:    • Difficulty of Paying Living Expenses:    Food Insecurity:    • Worried About Running Out of Food in the Last Year:    • Ran Out of Food in the Last Year:    Transportation Needs:    • Lack of Transportation (Medical):    • Lack of Transportation (Non-Medical):    Physical Activity:    • Days of Exercise per Week:    • Minutes of Exercise per Session:    Stress:    • Feeling of Stress :    Social Connections:    • Frequency of Communication with Friends and Family:    • Frequency of Social Gatherings with Friends and Family:    • Attends  [employer] Services:    • Active Member of Clubs or Organizations:    • Attends Club or Organization Meetings:    • Marital Status:    Intimate Partner Violence:    • Fear of Current or Ex-Partner:    • Emotionally Abused:    • Physically Abused:    • Sexually Abused:        Family History:  Family History   Problem Relation Age of Onset   • Diabetes Sister    • Hypertension Sister    • Thyroid Sister    • Cancer Mother         skin cancer   • Hypertension Mother    • Thyroid Mother    • Hypertension Father    • Thyroid Father    • Hyperlipidemia Father    • Glaucoma Maternal Grandmother    • Cancer Paternal Grandfather         throat cancer   • Diabetes Sister    • Diabetes Son        Medications:    Current Outpatient Medications:   •  losartan (COZAAR) 25 MG Tab, Take 1 Tablet by mouth every day., Disp: 30 Tablet, Rfl: 3  •  simvastatin (ZOCOR) 20 MG Tab, Take 1 tablet by mouth every evening., Disp: 30 tablet, Rfl: 11  •  metformin (GLUCOPHAGE) 1000 MG tablet, Take 1 tablet by mouth 2 times a day with meals., Disp: 180 tablet, Rfl: 3  •  fenofibrate (TRICOR) 145 MG Tab, Take 1 tablet by mouth every day., Disp: 90 tablet, Rfl: 3  •  atenolol (TENORMIN) 50 MG Tab, Take 1 tablet by mouth every day., Disp: 90 tablet, Rfl: 3  •  amLODIPine (NORVASC) 10 MG Tab, Take 1 tablet by mouth every day., Disp: 90 tablet, Rfl: 3  •  cetirizine (KLS ALLER-CIPRIANO) 10 MG Tab, Take 10 mg by mouth every day., Disp: , Rfl:   •  naproxen (EC NAPROSYN) 500 MG EC tablet, Take  by mouth as needed., Disp: , Rfl:   •  Multiple Vitamins-Minerals (MULTIVITAMIN ADULT PO), Take  by mouth., Disp: , Rfl:   •  Multiple Vitamins-Minerals (EYE VITAMINS PO), Take  by mouth., Disp: , Rfl:     Labs: Reviewed    Physical Examination:  Vital signs: /79   Pulse 75   Wt 84 kg (185 lb 1.6 oz)   BMI 33.86 kg/m²  Body mass index is 33.86 kg/m².    Assessment and Plan:    1. DM2  Patient presents to DM clinic today for initial visit.   Patient's current  "A1c is above goal at 9.4%.   · Basic physiology of DMII was explained to patient as well as microvascular/macrovascular complications. The importance of increasing physical activity to improve diabetes control was discussed with the patient. Patient was also educated on changing diet and making better choices to help control blood sugar.  · Patient has just started testing her FBG and reports numbers to be 190-220.   · Patient currently optimized on Metformin 1000mg BID.     - Medication changes - Patient will d/c Januvia.   Patient will start on Jardiance 10mg QD. Patient educated on MOA and potential side effects and encouraged to stay well hydrated.  Patient just started on new BP medicine and will monitor for any BP readings <110/70 she is instructed to call the clinic.     - Lifestyle changes - Patient encouraged to utilize healthier food choices such as complex carbs vs simple/empty carbs, wheat bread instead of white, brown rice instead of white etc. Patient encouraged to minimize carb intake and increase lean proteins and veggies. Discussed \"plate method\".  Patient has a bad hip, so exercise is minimal at this time. She has been referred to our clinic to help get her A1c down for hip replacement surgery. Encouraged her to try her best to move her body as much as tolerable.     Follow up in 2 weeks.     [place]  PharmD  10/12/21    CC:   NIMA Frankel M.D.    "

## 2021-10-26 ENCOUNTER — OFFICE VISIT (OUTPATIENT)
Dept: MEDICAL GROUP | Facility: PHYSICIAN GROUP | Age: 51
End: 2021-10-26
Payer: COMMERCIAL

## 2021-10-26 ENCOUNTER — NON-PROVIDER VISIT (OUTPATIENT)
Dept: VASCULAR LAB | Facility: MEDICAL CENTER | Age: 51
End: 2021-10-26
Attending: INTERNAL MEDICINE
Payer: COMMERCIAL

## 2021-10-26 VITALS
WEIGHT: 182 LBS | HEIGHT: 62 IN | TEMPERATURE: 97 F | BODY MASS INDEX: 33.49 KG/M2 | OXYGEN SATURATION: 95 % | SYSTOLIC BLOOD PRESSURE: 118 MMHG | HEART RATE: 75 BPM | DIASTOLIC BLOOD PRESSURE: 74 MMHG

## 2021-10-26 DIAGNOSIS — M16.11 PRIMARY OSTEOARTHRITIS OF RIGHT HIP: ICD-10-CM

## 2021-10-26 DIAGNOSIS — E11.9 TYPE 2 DIABETES MELLITUS WITHOUT COMPLICATION, WITHOUT LONG-TERM CURRENT USE OF INSULIN (HCC): ICD-10-CM

## 2021-10-26 DIAGNOSIS — E11.65 TYPE 2 DIABETES MELLITUS WITH HYPERGLYCEMIA, WITHOUT LONG-TERM CURRENT USE OF INSULIN (HCC): ICD-10-CM

## 2021-10-26 PROCEDURE — 99213 OFFICE O/P EST LOW 20 MIN: CPT | Performed by: NURSE PRACTITIONER

## 2021-10-26 PROCEDURE — 99212 OFFICE O/P EST SF 10 MIN: CPT

## 2021-10-26 RX ORDER — CELECOXIB 200 MG/1
200 CAPSULE ORAL 2 TIMES DAILY
Qty: 60 CAPSULE | Refills: 3 | Status: SHIPPED | OUTPATIENT
Start: 2021-10-26 | End: 2022-04-20 | Stop reason: SDUPTHER

## 2021-10-26 RX ORDER — KETOROLAC TROMETHAMINE 30 MG/ML
30 INJECTION, SOLUTION INTRAMUSCULAR; INTRAVENOUS ONCE
Status: COMPLETED | OUTPATIENT
Start: 2021-10-26 | End: 2021-10-27

## 2021-10-26 ASSESSMENT — FIBROSIS 4 INDEX: FIB4 SCORE: 1.54

## 2021-10-26 NOTE — PROGRESS NOTES
Chief Complaint   Patient presents with   • Hypertension     follow up   • Medication Management     for hip pain       Subjective:   Sanna Yuen is a 50 y.o. female here today for evaluation and management of:    Osteoarthritis of hip  Pain increasing she is working to get A1c down so she can get hip replaced.  Taking alieve .  Has not added tylenol.   Will start celebrex.  Advise to add ES tylenol in between doses.  Heat may help.      Type 2 diabetes mellitus with hyperglycemia, without long-term current use of insulin ([facility])  a1c at 9.4  Working with pharmacy to add meds to help.  jardiance x 2 weeks.  Appointment today and will start injectable.  Working on diet with plate method.  Doing well.          Current medicines (including changes today)  Current Outpatient Medications   Medication Sig Dispense Refill   • celecoxib (CELEBREX) 200 MG Cap Take 1 Capsule by mouth 2 times a day. 60 Capsule 3   • losartan (COZAAR) 25 MG Tab Take 1 Tablet by mouth every day. 30 Tablet 3   • simvastatin (ZOCOR) 20 MG Tab Take 1 tablet by mouth every evening. 30 tablet 11   • metformin (GLUCOPHAGE) 1000 MG tablet Take 1 tablet by mouth 2 times a day with meals. 180 tablet 3   • fenofibrate (TRICOR) 145 MG Tab Take 1 tablet by mouth every day. 90 tablet 3   • atenolol (TENORMIN) 50 MG Tab Take 1 tablet by mouth every day. 90 tablet 3   • amLODIPine (NORVASC) 10 MG Tab Take 1 tablet by mouth every day. 90 tablet 3   • cetirizine (KLS ALLER-CIPRIANO) 10 MG Tab Take 10 mg by mouth every day.     • Multiple Vitamins-Minerals (MULTIVITAMIN ADULT PO) Take  by mouth.     • Multiple Vitamins-Minerals (EYE VITAMINS PO) Take  by mouth.     • naproxen (EC NAPROSYN) 500 MG EC tablet Take  by mouth as needed. (Patient not taking: Reported on 10/26/2021)       Current Facility-Administered Medications   Medication Dose Route Frequency Provider Last Rate Last Admin   • ketorolac (TORADOL) injection 30 mg  30 mg Intramuscular Once Xin  "RUSTAM Meza.         She  has a past medical history of Arthritis, Diabetes (HCC), Hyperlipidemia, and Hypertension. She also has no past medical history of Breast cancer (HCC).    ROS as stated in hpi  No chest pain, no shortness of breath, no abdominal pain       Objective:     /74 (BP Location: Left arm, Patient Position: Sitting, BP Cuff Size: Adult)   Pulse 75   Temp 36.1 °C (97 °F) (Temporal)   Ht 1.575 m (5' 2\")   Wt 82.6 kg (182 lb)   SpO2 95%  Body mass index is 33.29 kg/m².   Physical Exam:  Constitutional: Alert, no distress.  Skin: Warm, dry, good turgor,no cyanosis, no rashes in visible areas.  Eye: Equal, round and reactive, conjunctiva clear, lids normal.  Neck: Trachea midline, no masses, no obvious thyroid enlargement.. No cervical or supraclavicular lymphadenopathy. Range of motion within normal limits.  Neuro: Cranial nerves 2-12 grossly intact.  No sensory deficit.\  MSK  Pain in hip  Respiratory: Unlabored respiratory effort,  Psych: Alert and oriented x3, normal affect and mood and judgement.        Assessment and Plan:   The following treatment plan was discussed    1. Primary osteoarthritis of right hip  Chornic, ongoing,  Awaiting hip replacement.  celebrex bid.  ES tylenol in between, heat.  Follow up ortho.     2. Type 2 diabetes mellitus with hyperglycemia, without long-term current use of insulin (HCC)  chornic ongoing.  Working with pharm team to improve a1c and control.  Appointment today.  jardiance has been added and she is looking at adding injectable.  Monitor.       Followup: No follow-ups on file.         Educated in proper administration of medication(s) ordered today including safety, possible SE, risks, benefits, rationale and alternatives to therapy.     Please note that this dictation was created using voice recognition software. I have made every reasonable attempt to correct obvious errors, but I expect that there are errors of grammar and possibly content " that I did not discover before finalizing the note.

## 2021-10-26 NOTE — ASSESSMENT & PLAN NOTE
Pain increasing she is working to get A1c down so she can get hip replaced.  Taking alieve .  Has not added tylenol.   Will start celebrex.  Advise to add ES tylenol in between doses.  Heat may help.

## 2021-10-26 NOTE — ASSESSMENT & PLAN NOTE
a1c at 9.4  Working with pharmacy to add meds to help.  jardiance x 2 weeks.  Appointment today and will start injectable.  Working on diet with plate method.  Doing well.

## 2021-10-27 VITALS — HEART RATE: 81 BPM | DIASTOLIC BLOOD PRESSURE: 81 MMHG | SYSTOLIC BLOOD PRESSURE: 134 MMHG

## 2021-10-27 RX ADMIN — KETOROLAC TROMETHAMINE 30 MG: 30 INJECTION, SOLUTION INTRAMUSCULAR; INTRAVENOUS at 08:39

## 2021-10-27 NOTE — PROGRESS NOTES
Patient Consult Note    Primary care physician: NIMA Frankel    Reason for consult: Management of Uncontrolled Type 2 Diabetes    HPI:  Sanna Yuen is a 50 y.o. old patient who comes in today for evaluation of above stated problem.    Most Recent HbA1c:   Lab Results   Component Value Date/Time    HBA1C 9.4 (H) 09/19/2021 09:00 PM    HBA1C 8.9 (H) 09/01/2021 02:30 PM      Lab Results   Component Value Date/Time    CREATININE 0.57 06/23/2021 07:23 AM              Diabetes Medication History and Current Regimen  Metformin: 1000mg BID     SGLT-2 Inhibitor:  Empagliflozin 10 mg once daily     Pt has home glucometer and proper testing technique - yes    Pt reports blood sugars:   Before Breakfast: 180-210    Hypoglycemia awareness - yes  Nocturnal hypoglycemia- denies  Hypoglycemia:  None    Pt's treatment of Hypoglycemia - juice  - 15:15 Rule    Current Exercise - moderate walking- with her current job she walks 5 x per week about 8000 steps. Patient has bad hip, so does not do much outside of working.   Exercise Goal - elevated heartrate reaching a goal of 150 min/wk    Dietary - Common Adult     Pt reports eating: less sweets (has omitted candy, ice cream, cookies etc)   Patient works beginning at 4am so her diet varies. She still eats pizza, pasta, sandwiches, pupusa, soup, chicken    Foot Exam:  Monofilament exam - annually and also self examines daily    Preventative Management  BP regimen (ACE/ARB) - Losartan 25mg QD  ASA - n/a  Statin - Simvastatin 20mg QD  Last Retinal Scan - annually - due 2022  Last Foot Exam - annually - due 2022  Last A1c -   Lab Results   Component Value Date/Time    HBA1C 9.4 (H) 09/19/2021 09:00 PM    HBA1C 8.9 (H) 09/01/2021 02:30 PM      Last Microalbuminuria - ordered     updated caregaps    Past Medical History:  Patient Active Problem List    Diagnosis Date Noted   • Osteoarthritis of hip 09/13/2021   • Type 2 diabetes mellitus with hyperglycemia, without  long-term current use of insulin (HCC) 07/28/2021   • Essential hypertension 07/28/2021   • Mixed dyslipidemia 07/28/2021   • Hot flashes due to menopause 07/28/2021   • Right leg injury 07/28/2021   • Class 1 obesity due to excess calories with serious comorbidity and body mass index (BMI) of 31.0 to 31.9 in adult 07/28/2021   • Cigarette smoker one half pack a day or less 07/28/2021       Past Surgical History:  Past Surgical History:   Procedure Laterality Date   • CHOLECYSTECTOMY     • PRIMARY C SECTION     • TUBAL COAGULATION LAPAROSCOPIC BILATERAL         Allergies:  Lipitor [atorvastatin], Niacin, and Penicillins    Social History:  Social History     Socioeconomic History   • Marital status: Single     Spouse name: Not on file   • Number of children: Not on file   • Years of education: Not on file   • Highest education level: Not on file   Occupational History   • Not on file   Tobacco Use   • Smoking status: Current Every Day Smoker     Packs/day: 0.50     Years: 10.00     Pack years: 5.00     Types: Cigarettes   • Smokeless tobacco: Never Used   Vaping Use   • Vaping Use: Never used   Substance and Sexual Activity   • Alcohol use: Yes     Comment: 3-5 times a year one drink   • Drug use: Never   • Sexual activity: Yes     Partners: Male     Birth control/protection: Surgical   Other Topics Concern   • Not on file   Social History Narrative   • Not on file     Social Determinants of Health     Financial Resource Strain:    • Difficulty of Paying Living Expenses:    Food Insecurity:    • Worried About Running Out of Food in the Last Year:    • Ran Out of Food in the Last Year:    Transportation Needs:    • Lack of Transportation (Medical):    • Lack of Transportation (Non-Medical):    Physical Activity:    • Days of Exercise per Week:    • Minutes of Exercise per Session:    Stress:    • Feeling of Stress :    Social Connections:    • Frequency of Communication with Friends and Family:    • Frequency of  Social Gatherings with Friends and Family:    • Attends [employer] Services:    • Active Member of Clubs or Organizations:    • Attends Club or Organization Meetings:    • Marital Status:    Intimate Partner Violence:    • Fear of Current or Ex-Partner:    • Emotionally Abused:    • Physically Abused:    • Sexually Abused:        Family History:  Family History   Problem Relation Age of Onset   • Diabetes Sister    • Hypertension Sister    • Thyroid Sister    • Cancer Mother         skin cancer   • Hypertension Mother    • Thyroid Mother    • Hypertension Father    • Thyroid Father    • Hyperlipidemia Father    • Glaucoma Maternal Grandmother    • Cancer Paternal Grandfather         throat cancer   • Diabetes Sister    • Diabetes Son        Medications:    Current Outpatient Medications:   •  celecoxib (CELEBREX) 200 MG Cap, Take 1 Capsule by mouth 2 times a day., Disp: 60 Capsule, Rfl: 3  •  losartan (COZAAR) 25 MG Tab, Take 1 Tablet by mouth every day., Disp: 30 Tablet, Rfl: 3  •  simvastatin (ZOCOR) 20 MG Tab, Take 1 tablet by mouth every evening., Disp: 30 tablet, Rfl: 11  •  metformin (GLUCOPHAGE) 1000 MG tablet, Take 1 tablet by mouth 2 times a day with meals., Disp: 180 tablet, Rfl: 3  •  fenofibrate (TRICOR) 145 MG Tab, Take 1 tablet by mouth every day., Disp: 90 tablet, Rfl: 3  •  atenolol (TENORMIN) 50 MG Tab, Take 1 tablet by mouth every day., Disp: 90 tablet, Rfl: 3  •  amLODIPine (NORVASC) 10 MG Tab, Take 1 tablet by mouth every day., Disp: 90 tablet, Rfl: 3  •  cetirizine (KLS ALLER-CIPRIANO) 10 MG Tab, Take 10 mg by mouth every day., Disp: , Rfl:   •  naproxen (EC NAPROSYN) 500 MG EC tablet, Take  by mouth as needed. (Patient not taking: Reported on 10/26/2021), Disp: , Rfl:   •  Multiple Vitamins-Minerals (MULTIVITAMIN ADULT PO), Take  by mouth., Disp: , Rfl:   •  Multiple Vitamins-Minerals (EYE VITAMINS PO), Take  by mouth., Disp: , Rfl:     Labs: Reviewed    Physical Examination:  Vital signs: /81   " Pulse 81  There is no height or weight on file to calculate BMI.  Patient took weight with PCP earlier today 182lbs.    Assessment and Plan:    1. DM2  Patient here today to follow up in clinic for DM2 management during computer \"downtime\".    Patient reports tolerating current medication much better than previous regimen (with Januvia).  Patient reports FBG remains above goal @ 180-210.  Patient reports walking as much as she can while at work and doing well omitting sweets from her diet.   Patient will continue on Metformin 1000mg BID.   Patient will continue with Jardiance 10mg x 1 week and rx for Jardiance 25mg will be sent in to the pharmacy. Copay card given to patient to help with potential cost associated.  Patient will start on Trulicity 0.75mg Q7 days. Samples given and first injection given in clinic today.     - Medication changes - Patient will take Jardiance 10mg QD x 7 more days, while rx for Jardiance 25mg QD sent in to be started thereafter.   Patient will start on Trulicity 0.75mg Q 7 days. Samples provided and first injection given in clinic today.      - Lifestyle changes - Patient will continue to walk during work and will try to increase movement outside of work as tolerated.   Patient encouraged to continue to make healthier food choices and substitutions when able and to cut down on carbs in general.   Patient praised for omitted sugary treats and snacks from diet.       Follow up again in 2 weeks.    [place]  PharmD  10/27/21    CC:   NIMA Frankel      "

## 2021-10-28 RX ORDER — EMPAGLIFLOZIN 25 MG/1
TABLET, FILM COATED ORAL
Qty: 30 TABLET | Refills: 3 | Status: SHIPPED | OUTPATIENT
Start: 2021-10-28 | End: 2022-02-22

## 2021-10-31 SDOH — ECONOMIC STABILITY: TRANSPORTATION INSECURITY
IN THE PAST 12 MONTHS, HAS THE LACK OF TRANSPORTATION KEPT YOU FROM MEDICAL APPOINTMENTS OR FROM GETTING MEDICATIONS?: NO

## 2021-10-31 SDOH — ECONOMIC STABILITY: HOUSING INSECURITY: IN THE LAST 12 MONTHS, HOW MANY PLACES HAVE YOU LIVED?: 1

## 2021-10-31 SDOH — ECONOMIC STABILITY: HOUSING INSECURITY
IN THE LAST 12 MONTHS, WAS THERE A TIME WHEN YOU DID NOT HAVE A STEADY PLACE TO SLEEP OR SLEPT IN A SHELTER (INCLUDING NOW)?: NO

## 2021-10-31 SDOH — ECONOMIC STABILITY: TRANSPORTATION INSECURITY
IN THE PAST 12 MONTHS, HAS LACK OF TRANSPORTATION KEPT YOU FROM MEETINGS, WORK, OR FROM GETTING THINGS NEEDED FOR DAILY LIVING?: NO

## 2021-10-31 SDOH — HEALTH STABILITY: PHYSICAL HEALTH: ON AVERAGE, HOW MANY MINUTES DO YOU ENGAGE IN EXERCISE AT THIS LEVEL?: PATIENT DECLINED

## 2021-10-31 SDOH — HEALTH STABILITY: PHYSICAL HEALTH: ON AVERAGE, HOW MANY DAYS PER WEEK DO YOU ENGAGE IN MODERATE TO STRENUOUS EXERCISE (LIKE A BRISK WALK)?: 5 DAYS

## 2021-10-31 SDOH — ECONOMIC STABILITY: INCOME INSECURITY: IN THE LAST 12 MONTHS, WAS THERE A TIME WHEN YOU WERE NOT ABLE TO PAY THE MORTGAGE OR RENT ON TIME?: NO

## 2021-10-31 SDOH — ECONOMIC STABILITY: FOOD INSECURITY: WITHIN THE PAST 12 MONTHS, THE FOOD YOU BOUGHT JUST DIDN'T LAST AND YOU DIDN'T HAVE MONEY TO GET MORE.: PATIENT DECLINED

## 2021-10-31 SDOH — ECONOMIC STABILITY: INCOME INSECURITY: HOW HARD IS IT FOR YOU TO PAY FOR THE VERY BASICS LIKE FOOD, HOUSING, MEDICAL CARE, AND HEATING?: HARD

## 2021-10-31 SDOH — ECONOMIC STABILITY: FOOD INSECURITY: WITHIN THE PAST 12 MONTHS, YOU WORRIED THAT YOUR FOOD WOULD RUN OUT BEFORE YOU GOT MONEY TO BUY MORE.: SOMETIMES TRUE

## 2021-10-31 SDOH — HEALTH STABILITY: MENTAL HEALTH
STRESS IS WHEN SOMEONE FEELS TENSE, NERVOUS, ANXIOUS, OR CAN'T SLEEP AT NIGHT BECAUSE THEIR MIND IS TROUBLED. HOW STRESSED ARE YOU?: NOT AT ALL

## 2021-10-31 SDOH — ECONOMIC STABILITY: TRANSPORTATION INSECURITY
IN THE PAST 12 MONTHS, HAS LACK OF RELIABLE TRANSPORTATION KEPT YOU FROM MEDICAL APPOINTMENTS, MEETINGS, WORK OR FROM GETTING THINGS NEEDED FOR DAILY LIVING?: NO

## 2021-10-31 ASSESSMENT — SOCIAL DETERMINANTS OF HEALTH (SDOH)
DO YOU BELONG TO ANY CLUBS OR ORGANIZATIONS SUCH AS CHURCH GROUPS UNIONS, FRATERNAL OR ATHLETIC GROUPS, OR SCHOOL GROUPS?: NO
HOW OFTEN DO YOU ATTENT MEETINGS OF THE CLUB OR ORGANIZATION YOU BELONG TO?: PATIENT DECLINED
HOW MANY DRINKS CONTAINING ALCOHOL DO YOU HAVE ON A TYPICAL DAY WHEN YOU ARE DRINKING: 1 OR 2
IN A TYPICAL WEEK, HOW MANY TIMES DO YOU TALK ON THE PHONE WITH FAMILY, FRIENDS, OR NEIGHBORS?: MORE THAN THREE TIMES A WEEK
DO YOU BELONG TO ANY CLUBS OR ORGANIZATIONS SUCH AS CHURCH GROUPS UNIONS, FRATERNAL OR ATHLETIC GROUPS, OR SCHOOL GROUPS?: NO
HOW OFTEN DO YOU HAVE SIX OR MORE DRINKS ON ONE OCCASION: NEVER
HOW OFTEN DO YOU ATTEND CHURCH OR RELIGIOUS SERVICES?: 1 TO 4 TIMES PER YEAR
HOW OFTEN DO YOU ATTEND CHURCH OR RELIGIOUS SERVICES?: 1 TO 4 TIMES PER YEAR
HOW OFTEN DO YOU HAVE A DRINK CONTAINING ALCOHOL: MONTHLY OR LESS
HOW OFTEN DO YOU GET TOGETHER WITH FRIENDS OR RELATIVES?: MORE THAN THREE TIMES A WEEK
HOW OFTEN DO YOU GET TOGETHER WITH FRIENDS OR RELATIVES?: MORE THAN THREE TIMES A WEEK
HOW HARD IS IT FOR YOU TO PAY FOR THE VERY BASICS LIKE FOOD, HOUSING, MEDICAL CARE, AND HEATING?: HARD
WITHIN THE PAST 12 MONTHS, YOU WORRIED THAT YOUR FOOD WOULD RUN OUT BEFORE YOU GOT THE MONEY TO BUY MORE: SOMETIMES TRUE
IN A TYPICAL WEEK, HOW MANY TIMES DO YOU TALK ON THE PHONE WITH FAMILY, FRIENDS, OR NEIGHBORS?: MORE THAN THREE TIMES A WEEK
HOW OFTEN DO YOU ATTENT MEETINGS OF THE CLUB OR ORGANIZATION YOU BELONG TO?: PATIENT DECLINED

## 2021-10-31 ASSESSMENT — LIFESTYLE VARIABLES
HOW OFTEN DO YOU HAVE A DRINK CONTAINING ALCOHOL: MONTHLY OR LESS
HOW OFTEN DO YOU HAVE SIX OR MORE DRINKS ON ONE OCCASION: NEVER
HOW MANY STANDARD DRINKS CONTAINING ALCOHOL DO YOU HAVE ON A TYPICAL DAY: 1 OR 2

## 2021-11-01 ENCOUNTER — OFFICE VISIT (OUTPATIENT)
Dept: MEDICAL GROUP | Facility: PHYSICIAN GROUP | Age: 51
End: 2021-11-01
Payer: COMMERCIAL

## 2021-11-01 VITALS
HEART RATE: 68 BPM | OXYGEN SATURATION: 98 % | SYSTOLIC BLOOD PRESSURE: 126 MMHG | RESPIRATION RATE: 16 BRPM | HEIGHT: 62 IN | DIASTOLIC BLOOD PRESSURE: 60 MMHG | WEIGHT: 181 LBS | TEMPERATURE: 98.7 F | BODY MASS INDEX: 33.31 KG/M2

## 2021-11-01 DIAGNOSIS — I10 ESSENTIAL HYPERTENSION: ICD-10-CM

## 2021-11-01 DIAGNOSIS — Z00.00 HEALTHCARE MAINTENANCE: ICD-10-CM

## 2021-11-01 DIAGNOSIS — M25.511 CHRONIC RIGHT SHOULDER PAIN: ICD-10-CM

## 2021-11-01 DIAGNOSIS — E66.09 CLASS 1 OBESITY DUE TO EXCESS CALORIES WITH SERIOUS COMORBIDITY AND BODY MASS INDEX (BMI) OF 31.0 TO 31.9 IN ADULT: ICD-10-CM

## 2021-11-01 DIAGNOSIS — G89.29 CHRONIC RIGHT SHOULDER PAIN: ICD-10-CM

## 2021-11-01 DIAGNOSIS — E11.65 TYPE 2 DIABETES MELLITUS WITH HYPERGLYCEMIA, WITHOUT LONG-TERM CURRENT USE OF INSULIN (HCC): ICD-10-CM

## 2021-11-01 DIAGNOSIS — Z12.31 ENCOUNTER FOR SCREENING MAMMOGRAM FOR MALIGNANT NEOPLASM OF BREAST: ICD-10-CM

## 2021-11-01 DIAGNOSIS — E78.2 MIXED DYSLIPIDEMIA: ICD-10-CM

## 2021-11-01 DIAGNOSIS — Z23 NEED FOR VACCINATION: ICD-10-CM

## 2021-11-01 PROCEDURE — 99214 OFFICE O/P EST MOD 30 MIN: CPT | Mod: 25 | Performed by: STUDENT IN AN ORGANIZED HEALTH CARE EDUCATION/TRAINING PROGRAM

## 2021-11-01 PROCEDURE — 90471 IMMUNIZATION ADMIN: CPT | Performed by: STUDENT IN AN ORGANIZED HEALTH CARE EDUCATION/TRAINING PROGRAM

## 2021-11-01 PROCEDURE — 90750 HZV VACC RECOMBINANT IM: CPT | Performed by: STUDENT IN AN ORGANIZED HEALTH CARE EDUCATION/TRAINING PROGRAM

## 2021-11-01 ASSESSMENT — FIBROSIS 4 INDEX: FIB4 SCORE: 1.57

## 2021-11-01 NOTE — PROGRESS NOTES
Subjective:     CC:  Diagnoses of Class 1 obesity due to excess calories with serious comorbidity and body mass index (BMI) of 31.0 to 31.9 in adult, Essential hypertension, Mixed dyslipidemia, Type 2 diabetes mellitus with hyperglycemia, without long-term current use of insulin (HCC), Chronic right shoulder pain, Healthcare maintenance, Encounter for screening mammogram for malignant neoplasm of breast, and Need for vaccination were pertinent to this visit.    HISTORY OF THE PRESENT ILLNESS: Patient is a 51 y.o. female. This pleasant patient is here today to establish care. Her prior PCP was Xin Meza.    1.  Class I obesity due to excess calories  Indicates that she does not exercise.  Her description of her diet appears to be high in processed carbohydrates.    2.  Essential hypertension  Longstanding.  Treated with losartan 25 mg daily, amlodipine 10 mg daily and atenolol 50 mg daily.  She does not keep a home blood pressure log.    3.  Mixed dyslipidemia  Longstanding, currently treated with fenofibrate 145 mg daily and simvastatin 20 mg daily.  She believes that the simvastatin was started just last month.    4.  Type 2 diabetes mellitus with hyperglycemia, without long-term current use of insulin (HCC)  Longstanding.  Recently consulted with [facility] and consulted with Renetta Morgan, pharmacist, who added Trulicity 0.75 mg / 0.5 mL q. 7 days to her diabetes regimen.    Active Diagnosis:    Patient Active Problem List   Diagnosis   • Type 2 diabetes mellitus with hyperglycemia, without long-term current use of insulin (HCC)   • Essential hypertension   • Mixed dyslipidemia   • Hot flashes due to menopause   • Right leg injury   • Class 1 obesity due to excess calories with serious comorbidity and body mass index (BMI) of 31.0 to 31.9 in adult   • Cigarette smoker one half pack a day or less   • Osteoarthritis of hip   • Chronic right shoulder pain  "       Current Outpatient Medications Ordered in Epic   Medication Sig Dispense Refill   • Dulaglutide 0.75 MG/0.5ML Solution Pen-injector Inject 0.5 mL under the skin every 7 days. 0.5 mL 11   • Empagliflozin (JARDIANCE) 25 MG Tab Take one tablet by mouth every day 30 Tablet 3   • celecoxib (CELEBREX) 200 MG Cap Take 1 Capsule by mouth 2 times a day. 60 Capsule 3   • losartan (COZAAR) 25 MG Tab Take 1 Tablet by mouth every day. 30 Tablet 3   • simvastatin (ZOCOR) 20 MG Tab Take 1 tablet by mouth every evening. 30 tablet 11   • metformin (GLUCOPHAGE) 1000 MG tablet Take 1 tablet by mouth 2 times a day with meals. 180 tablet 3   • fenofibrate (TRICOR) 145 MG Tab Take 1 tablet by mouth every day. 90 tablet 3   • atenolol (TENORMIN) 50 MG Tab Take 1 tablet by mouth every day. 90 tablet 3   • amLODIPine (NORVASC) 10 MG Tab Take 1 tablet by mouth every day. 90 tablet 3   • cetirizine (KLS ALLER-CIPRIANO) 10 MG Tab Take 10 mg by mouth every day.     • Multiple Vitamins-Minerals (MULTIVITAMIN ADULT PO) Take  by mouth.     • Multiple Vitamins-Minerals (EYE VITAMINS PO) Take  by mouth.       No current Epic-ordered facility-administered medications on file.     ROS:   Gen: No fevers/chills, no changes in weight  HEENT: No changes in vision/hearing, sore throat.  Pulm: No cough, unexplained SOB.  CV: No chest pain/pressure, no palpitations  GI: No nausea/vomiting, no diarrhea  : No dysuria/nocturia  MSk: No myalgias  Skin: No rash/skin changes  Neuro: No headaches, no numbness/tingling  Heme/Lymph: no easy bruising      Objective:     Exam: /60 (BP Location: Left arm, Patient Position: Sitting, BP Cuff Size: Adult)   Pulse 68   Temp 37.1 °C (98.7 °F) (Temporal)   Resp 16   Ht 1.575 m (5' 2\")   Wt 82.1 kg (181 lb)   SpO2 98%  Body mass index is 33.11 kg/m².    General: Normal appearing. No distress.  HEENT: Normocephalic. Eyes conjunctiva clear lids without ptosis, pupils equal and reactive to light accommodation. " Ears normal shape and contour, canals are clear bilaterally, tympanic membranes are benign, nasal mucosa benign, oropharynx is without erythema, edema or exudates.   Neck: Supple without JVD or bruit. Thyroid is not enlarged.  Pulmonary: Clear to ausculation.  Normal effort. No rales, ronchi, or wheezing.  Cardiovascular: Regular rate and rhythm without murmur. Radial pulses are intact and equal bilaterally.  Abdomen: Soft, nontender, nondistended. Normal bowel sounds. Liver and spleen are not palpable  Neurologic: Grossly nonfocal.  CN II through XII intact.  Lymph: No cervical or supraclavicular lymph nodes are palpable  Skin: Warm and dry.  No obvious lesions.  Musculoskeletal: Normal gait. No extremity cyanosis, clubbing, or edema.  Psych: Normal mood and affect. Alert and oriented x3. Judgment and insight are normal.    Monofilament testing with a 10 gram force: sensation intact: intact bilaterally  Visual Inspection: Feet without maceration, ulcers, fissures.  Pedal pulses: intact bilaterally      A chaperone was offered to the patient during today's exam. Patient declined chaperone.    Labs:   9/19/2021:  -Hemoglobin A1c 9.4%.    6/23/2021:  -CBC demonstrated slightly increased hematocrit  -CMP with elevated glucose 174  -Lipid profile demonstrated total cholesterol 263, triglycerides 324, HDL 42, .    Assessment & Plan:   51 y.o. female with the following -    1. Class 1 obesity due to excess calories with serious comorbidity and body mass index (BMI) of 31.0 to 31.9 in adult  -I have counseled the patient about diet/exercise/healthy weight maintenance.  The patient is having difficulty with exercise due to the osteoarthritis of her hip, she cannot be cleared for surgery until she has improvement in her diabetes.    2. Essential hypertension  -Chronic, stable.  Well-controlled without side effect.  -Continue losartan 25 mg daily, amlodipine 10 mg daily, atenolol 50 mg daily.    3. Mixed  dyslipidemia  -Chronic, recently started treatment.  Current ASCVD risk of 17.8%.  -Continue simvastatin 20 mg daily.  Continue fenofibrate 145 mg daily.  -Recheck lipid profile to assess efficacy in the beginning of January.    4. Type 2 diabetes mellitus with hyperglycemia, without long-term current use of insulin (HCC)  -Chronic.  Recently improved medication regimen.  -Continue Metformin 1000 mg twice daily, dulaglutide 0.75 mg / 0.5 mL q. 7 days, Jardiance 25 mg q. 7 days.  -Recheck hemoglobin A1c in January.    Return in about 3 months (around 2/1/2022).    Please note that this dictation was created using voice recognition software. I have made every reasonable attempt to correct obvious errors, but I expect that there are errors of grammar and possibly content that I did not discover before finalizing the note.    Lee Ghotra PA-C 11/1/2021

## 2021-11-09 ENCOUNTER — NON-PROVIDER VISIT (OUTPATIENT)
Dept: VASCULAR LAB | Facility: MEDICAL CENTER | Age: 51
End: 2021-11-09
Attending: INTERNAL MEDICINE
Payer: COMMERCIAL

## 2021-11-09 DIAGNOSIS — E11.9 TYPE 2 DIABETES MELLITUS WITHOUT COMPLICATION, WITHOUT LONG-TERM CURRENT USE OF INSULIN (HCC): ICD-10-CM

## 2021-11-09 PROCEDURE — 99212 OFFICE O/P EST SF 10 MIN: CPT

## 2021-11-09 RX ORDER — DULAGLUTIDE 1.5 MG/.5ML
0.5 INJECTION, SOLUTION SUBCUTANEOUS
Qty: 2.24 ML | Refills: 1 | Status: SHIPPED | OUTPATIENT
Start: 2021-11-09 | End: 2022-01-03

## 2021-11-09 NOTE — PROGRESS NOTES
Patient Consult Note    Primary care physician: NIMA Frankel    Reason for consult: Management of Uncontrolled Type 2 Diabetes    HPI:  Sanna Yuen is a 50 y.o. old patient who comes in today for evaluation of above stated problem.    Most Recent HbA1c:   Lab Results   Component Value Date/Time    HBA1C 9.4 (H) 09/19/2021 09:00 PM    HBA1C 8.9 (H) 09/01/2021 02:30 PM      Lab Results   Component Value Date/Time    CREATININE 0.57 06/23/2021 07:23 AM          Diabetes Medication History and Current Regimen  Metformin: 1000mg BID   SGLT-2 Inhibitor:  Jardiance 25mg once daily  trulicity 0.75mg every 7 days      Pt has home glucometer and proper testing technique - yes    Pt reports blood sugars:   Before Breakfast: 170    Hypoglycemia awareness - yes  Nocturnal hypoglycemia- denies  Hypoglycemia:  None    Pt's treatment of Hypoglycemia - juice  - 15:15 Rule    Current Exercise - moderate walking- with her current job she walks 5 x per week about 8000 steps. Patient has bad hip, so does not do much outside of working.   Exercise Goal - elevated heartrate reaching a goal of 150 min/wk    Dietary - Common Adult     Pt reports eating: less sweets (has omitted candy, ice cream, cookies etc)   Patient works beginning at 4am so her diet varies. She still eats pizza, pasta, sandwiches, pupusa, soup, chicken    Foot Exam:  Monofilament exam - annually and also self examines daily    Preventative Management  BP regimen (ACE/ARB) - Losartan 25mg QD  ASA - n/a  Statin - Simvastatin 20mg QD  Last Retinal Scan - annually - due 2022   Last Foot Exam - annually - due 2022  Last A1c -   Lab Results   Component Value Date/Time    HBA1C 9.4 (H) 09/19/2021 09:00 PM    HBA1C 8.9 (H) 09/01/2021 02:30 PM      Last Microalbuminuria - ordered     updated caregaps    Past Medical History:  Patient Active Problem List    Diagnosis Date Noted   • Chronic right shoulder pain 11/01/2021   • Osteoarthritis of hip 09/13/2021    • Type 2 diabetes mellitus with hyperglycemia, without long-term current use of insulin (HCC) 07/28/2021   • Essential hypertension 07/28/2021   • Mixed dyslipidemia 07/28/2021   • Hot flashes due to menopause 07/28/2021   • Right leg injury 07/28/2021   • Class 1 obesity due to excess calories with serious comorbidity and body mass index (BMI) of 31.0 to 31.9 in adult 07/28/2021   • Cigarette smoker one half pack a day or less 07/28/2021       Past Surgical History:  Past Surgical History:   Procedure Laterality Date   • CHOLECYSTECTOMY     • PRIMARY C SECTION     • TUBAL COAGULATION LAPAROSCOPIC BILATERAL         Allergies:  Lipitor [atorvastatin], Niacin, and Penicillins    Social History:  Social History     Socioeconomic History   • Marital status: Single     Spouse name: Not on file   • Number of children: Not on file   • Years of education: Not on file   • Highest education level: 12th grade   Occupational History   • Not on file   Tobacco Use   • Smoking status: Current Every Day Smoker     Packs/day: 0.50     Years: 10.00     Pack years: 5.00     Types: Cigarettes   • Smokeless tobacco: Never Used   Vaping Use   • Vaping Use: Never used   Substance and Sexual Activity   • Alcohol use: Yes     Comment: 3-5 times a year one drink   • Drug use: Never   • Sexual activity: Yes     Partners: Male     Birth control/protection: Surgical     Comment: one partner    Other Topics Concern   • Not on file   Social History Narrative   • Not on file     Social Determinants of Health     Financial Resource Strain: High Risk   • Difficulty of Paying Living Expenses: Hard   Food Insecurity: Food Insecurity Present   • Worried About Running Out of Food in the Last Year: Sometimes true   • Ran Out of Food in the Last Year: Patient refused   Transportation Needs: No Transportation Needs   • Lack of Transportation (Medical): No   • Lack of Transportation (Non-Medical): No   Physical Activity: Unknown   • Days of Exercise per  Week: 5 days   • Minutes of Exercise per Session: Patient refused   Stress: No Stress Concern Present   • Feeling of Stress : Not at all   Social Connections: Moderately Isolated   • Frequency of Communication with Friends and Family: More than three times a week   • Frequency of Social Gatherings with Friends and Family: More than three times a week   • Attends Sikhism Services: 1 to 4 times per year   • Active Member of Clubs or Organizations: No   • Attends Club or Organization Meetings: Patient refused   • Marital Status:    Intimate Partner Violence:    • Fear of Current or Ex-Partner: Not on file   • Emotionally Abused: Not on file   • Physically Abused: Not on file   • Sexually Abused: Not on file   Housing Stability: Low Risk    • Unable to Pay for Housing in the Last Year: No   • Number of Places Lived in the Last Year: 1   • Unstable Housing in the Last Year: No       Family History:  Family History   Problem Relation Age of Onset   • Diabetes Sister    • Hypertension Sister    • Thyroid Sister    • Cancer Mother         skin cancer   • Hypertension Mother    • Thyroid Mother    • Hypertension Father    • Thyroid Father    • Hyperlipidemia Father    • Glaucoma Maternal Grandmother    • Cancer Paternal Grandfather         throat cancer   • Diabetes Sister    • Diabetes Son        Medications:    Current Outpatient Medications:   •  Dulaglutide (TRULICITY) 1.5 MG/0.5ML Solution Pen-injector, Inject 0.5 mL under the skin every 7 days., Disp: 2.24 mL, Rfl: 1  •  Empagliflozin (JARDIANCE) 25 MG Tab, Take one tablet by mouth every day, Disp: 30 Tablet, Rfl: 3  •  celecoxib (CELEBREX) 200 MG Cap, Take 1 Capsule by mouth 2 times a day., Disp: 60 Capsule, Rfl: 3  •  losartan (COZAAR) 25 MG Tab, Take 1 Tablet by mouth every day., Disp: 30 Tablet, Rfl: 3  •  simvastatin (ZOCOR) 20 MG Tab, Take 1 tablet by mouth every evening., Disp: 30 tablet, Rfl: 11  •  metformin (GLUCOPHAGE) 1000 MG tablet, Take 1  tablet by mouth 2 times a day with meals., Disp: 180 tablet, Rfl: 3  •  fenofibrate (TRICOR) 145 MG Tab, Take 1 tablet by mouth every day., Disp: 90 tablet, Rfl: 3  •  atenolol (TENORMIN) 50 MG Tab, Take 1 tablet by mouth every day., Disp: 90 tablet, Rfl: 3  •  amLODIPine (NORVASC) 10 MG Tab, Take 1 tablet by mouth every day., Disp: 90 tablet, Rfl: 3  •  cetirizine (KLS ALLER-CIPRIANO) 10 MG Tab, Take 10 mg by mouth every day., Disp: , Rfl:   •  Multiple Vitamins-Minerals (MULTIVITAMIN ADULT PO), Take  by mouth., Disp: , Rfl:   •  Multiple Vitamins-Minerals (EYE VITAMINS PO), Take  by mouth., Disp: , Rfl:     Labs: Reviewed    Physical Examination:  Vital signs: There were no vitals taken for this visit. There is no height or weight on file to calculate BMI.  Patient took weight with PCP earlier today 182lbs.    Assessment and Plan:    1. DM2  Fasting blood sugar is still above goal, will increase Trulicity    - Medication changes   Metformin: 1000mg BID   SGLT-2 Inhibitor:  Jardiance 25mg once daily  trulicity 1.5mg every 7 days       - Lifestyle changes   - Patient will continue to walk during work and will try to increase movement outside of work as tolerated.   Patient encouraged to continue to make healthier food choices and substitutions when able and to cut down on carbs in general.   Patient praised for omitted sugary treats and snacks from diet.       Follow up again in 6 weeks.        CC:   NIMA Frankel

## 2021-12-29 ENCOUNTER — HOSPITAL ENCOUNTER (OUTPATIENT)
Dept: LAB | Facility: MEDICAL CENTER | Age: 51
End: 2021-12-29
Attending: INTERNAL MEDICINE
Payer: COMMERCIAL

## 2021-12-29 ENCOUNTER — HOSPITAL ENCOUNTER (OUTPATIENT)
Dept: LAB | Facility: MEDICAL CENTER | Age: 51
End: 2021-12-29
Attending: STUDENT IN AN ORGANIZED HEALTH CARE EDUCATION/TRAINING PROGRAM
Payer: COMMERCIAL

## 2021-12-29 DIAGNOSIS — E11.9 TYPE 2 DIABETES MELLITUS WITHOUT COMPLICATION, WITHOUT LONG-TERM CURRENT USE OF INSULIN (HCC): ICD-10-CM

## 2021-12-29 DIAGNOSIS — E78.2 MIXED DYSLIPIDEMIA: ICD-10-CM

## 2021-12-29 LAB
CHOLEST SERPL-MCNC: 170 MG/DL (ref 100–199)
EST. AVERAGE GLUCOSE BLD GHB EST-MCNC: 171 MG/DL
FASTING STATUS PATIENT QL REPORTED: NORMAL
HBA1C MFR BLD: 7.6 % (ref 4–5.6)
HDLC SERPL-MCNC: 36 MG/DL
LDLC SERPL CALC-MCNC: 75 MG/DL
TRIGL SERPL-MCNC: 294 MG/DL (ref 0–149)

## 2021-12-29 PROCEDURE — 83036 HEMOGLOBIN GLYCOSYLATED A1C: CPT

## 2021-12-29 PROCEDURE — 36415 COLL VENOUS BLD VENIPUNCTURE: CPT

## 2021-12-29 PROCEDURE — 80061 LIPID PANEL: CPT

## 2022-01-03 ENCOUNTER — NON-PROVIDER VISIT (OUTPATIENT)
Dept: MEDICAL GROUP | Facility: PHYSICIAN GROUP | Age: 52
End: 2022-01-03
Payer: COMMERCIAL

## 2022-01-03 VITALS
HEIGHT: 62 IN | WEIGHT: 169 LBS | BODY MASS INDEX: 31.1 KG/M2 | HEART RATE: 96 BPM | DIASTOLIC BLOOD PRESSURE: 86 MMHG | SYSTOLIC BLOOD PRESSURE: 126 MMHG

## 2022-01-03 PROCEDURE — 99402 PREV MED CNSL INDIV APPRX 30: CPT | Performed by: FAMILY MEDICINE

## 2022-01-03 RX ORDER — PIOGLITAZONEHYDROCHLORIDE 30 MG/1
30 TABLET ORAL DAILY
Qty: 30 TABLET | Refills: 3 | Status: SHIPPED | OUTPATIENT
Start: 2022-01-03 | End: 2022-03-23

## 2022-01-03 RX ORDER — DULAGLUTIDE 3 MG/.5ML
3 INJECTION, SOLUTION SUBCUTANEOUS
Qty: 4 ML | Refills: 3 | Status: SHIPPED | OUTPATIENT
Start: 2022-01-03 | End: 2022-01-31

## 2022-01-03 ASSESSMENT — FIBROSIS 4 INDEX: FIB4 SCORE: 1.57

## 2022-01-03 NOTE — Clinical Note
Dr. Buck  A1c has improved, but not quite at goal.  She needs to have her A1c less than 7.4 for a hip surgery.  I increased her Trulicity and added Actos 30 mg

## 2022-01-03 NOTE — PROGRESS NOTES
Patient Consult Note  [phone]  01/03/22    Primary care physician: NIMA Frankel    Reason for consult: Management of Uncontrolled Type 2 Diabetes    HPI:  Sanna Yuen is a 50 y.o. old patient who comes in today for evaluation of above stated problem.    Most Recent HbA1c:   Lab Results   Component Value Date/Time    HBA1C 7.6 (H) 12/29/2021 06:24 AM      Lab Results   Component Value Date/Time    CREATININE 0.57 06/23/2021 07:23 AM          Diabetes Medication History and Current Regimen  Metformin: 1000mg BID   SGLT-2 Inhibitor:  Jardiance 25mg once daily  trulicity 1.5mg every 7 days      Pt has home glucometer and proper testing technique - yes    Pt reports blood sugars:   Before Breakfast:  Most >130    Hypoglycemia awareness - yes  Nocturnal hypoglycemia- denies  Hypoglycemia:  None    Pt's treatment of Hypoglycemia - juice  - 15:15 Rule    Current Exercise - moderate walking- with her current job she walks 5 x per week about 8000 steps. Patient has bad hip, so does not do much outside of working.   Exercise Goal - elevated heartrate reaching a goal of 150 min/wk    Dietary - Common Adult     Pt reports eating: less sweets (has omitted candy, ice cream, cookies etc)   Patient works beginning at 4am so her diet varies. She still eats pizza, pasta, sandwiches, pupusa, soup, chicken    Foot Exam:  Monofilament exam - annually and also self examines daily    Preventative Management  BP regimen (ACE/ARB) - Losartan 25mg QD  ASA - n/a  Statin - Simvastatin 20mg QD  Last Retinal Scan - annually - due 2022   Last Foot Exam - annually - due 2022  Last A1c -   Lab Results   Component Value Date/Time    HBA1C 7.6 (H) 12/29/2021 06:24 AM      Last Microalbuminuria - ordered     updated caregaps    Past Medical History:  Patient Active Problem List    Diagnosis Date Noted   • Chronic right shoulder pain 11/01/2021   • Osteoarthritis of hip 09/13/2021   • Type 2 diabetes mellitus with hyperglycemia,  without long-term current use of insulin (HCC) 07/28/2021   • Essential hypertension 07/28/2021   • Mixed dyslipidemia 07/28/2021   • Hot flashes due to menopause 07/28/2021   • Right leg injury 07/28/2021   • Class 1 obesity due to excess calories with serious comorbidity and body mass index (BMI) of 31.0 to 31.9 in adult 07/28/2021   • Cigarette smoker one half pack a day or less 07/28/2021       Past Surgical History:  Past Surgical History:   Procedure Laterality Date   • CHOLECYSTECTOMY     • PRIMARY C SECTION     • TUBAL COAGULATION LAPAROSCOPIC BILATERAL         Allergies:  Lipitor [atorvastatin], Niacin, and Penicillins    Social History:  Social History     Socioeconomic History   • Marital status: Single     Spouse name: Not on file   • Number of children: Not on file   • Years of education: Not on file   • Highest education level: 12th grade   Occupational History   • Not on file   Tobacco Use   • Smoking status: Current Every Day Smoker     Packs/day: 0.50     Years: 10.00     Pack years: 5.00     Types: Cigarettes   • Smokeless tobacco: Never Used   Vaping Use   • Vaping Use: Never used   Substance and Sexual Activity   • Alcohol use: Yes     Comment: 3-5 times a year one drink   • Drug use: Never   • Sexual activity: Yes     Partners: Male     Birth control/protection: Surgical     Comment: one partner    Other Topics Concern   • Not on file   Social History Narrative   • Not on file     Social Determinants of Health     Financial Resource Strain: High Risk   • Difficulty of Paying Living Expenses: Hard   Food Insecurity: Food Insecurity Present   • Worried About Running Out of Food in the Last Year: Sometimes true   • Ran Out of Food in the Last Year: Patient refused   Transportation Needs: No Transportation Needs   • Lack of Transportation (Medical): No   • Lack of Transportation (Non-Medical): No   Physical Activity: Unknown   • Days of Exercise per Week: 5 days   • Minutes of Exercise per Session:  Patient refused   Stress: No Stress Concern Present   • Feeling of Stress : Not at all   Social Connections: Moderately Isolated   • Frequency of Communication with Friends and Family: More than three times a week   • Frequency of Social Gatherings with Friends and Family: More than three times a week   • Attends Religion Services: 1 to 4 times per year   • Active Member of Clubs or Organizations: No   • Attends Club or Organization Meetings: Patient refused   • Marital Status:    Intimate Partner Violence:    • Fear of Current or Ex-Partner: Not on file   • Emotionally Abused: Not on file   • Physically Abused: Not on file   • Sexually Abused: Not on file   Housing Stability: Low Risk    • Unable to Pay for Housing in the Last Year: No   • Number of Places Lived in the Last Year: 1   • Unstable Housing in the Last Year: No       Family History:  Family History   Problem Relation Age of Onset   • Diabetes Sister    • Hypertension Sister    • Thyroid Sister    • Cancer Mother         skin cancer   • Hypertension Mother    • Thyroid Mother    • Hypertension Father    • Thyroid Father    • Hyperlipidemia Father    • Glaucoma Maternal Grandmother    • Cancer Paternal Grandfather         throat cancer   • Diabetes Sister    • Diabetes Son        Medications:    Current Outpatient Medications:   •  Dulaglutide (TRULICITY) 1.5 MG/0.5ML Solution Pen-injector, Inject 0.5 mL under the skin every 7 days., Disp: 2.24 mL, Rfl: 1  •  Empagliflozin (JARDIANCE) 25 MG Tab, Take one tablet by mouth every day, Disp: 30 Tablet, Rfl: 3  •  celecoxib (CELEBREX) 200 MG Cap, Take 1 Capsule by mouth 2 times a day., Disp: 60 Capsule, Rfl: 3  •  losartan (COZAAR) 25 MG Tab, Take 1 Tablet by mouth every day., Disp: 30 Tablet, Rfl: 3  •  simvastatin (ZOCOR) 20 MG Tab, Take 1 tablet by mouth every evening., Disp: 30 tablet, Rfl: 11  •  metformin (GLUCOPHAGE) 1000 MG tablet, Take 1 tablet by mouth 2 times a day with meals., Disp: 180  tablet, Rfl: 3  •  fenofibrate (TRICOR) 145 MG Tab, Take 1 tablet by mouth every day., Disp: 90 tablet, Rfl: 3  •  atenolol (TENORMIN) 50 MG Tab, Take 1 tablet by mouth every day., Disp: 90 tablet, Rfl: 3  •  amLODIPine (NORVASC) 10 MG Tab, Take 1 tablet by mouth every day., Disp: 90 tablet, Rfl: 3  •  cetirizine (KLS ALLER-CIPRIANO) 10 MG Tab, Take 10 mg by mouth every day., Disp: , Rfl:   •  Multiple Vitamins-Minerals (MULTIVITAMIN ADULT PO), Take  by mouth., Disp: , Rfl:   •  Multiple Vitamins-Minerals (EYE VITAMINS PO), Take  by mouth., Disp: , Rfl:     Labs: Reviewed    Physical Examination:  Vital signs: There were no vitals taken for this visit. There is no height or weight on file to calculate BMI.  Patient took weight with PCP earlier today 182lbs.    Assessment and Plan:    1. DM2  Fasting blood sugar is still above goal, will increase Trulicity  Add Actos 30 mg once daily, no congestive heart failure.  Upcoming surgery, goal A1c less than 7.4.    - Medication changes   Metformin: 1000mg BID   SGLT-2 Inhibitor:  Jardiance 25mg once daily  Increase: trulicity 3mg every 7 days   Add: Actos 30 mg once daily     - Lifestyle changes   - Patient will continue to walk during work and will try to increase movement outside of work as tolerated.   Patient encouraged to continue to make healthier food choices and substitutions when able and to cut down on carbs in general.   Patient praised for omitted sugary treats and snacks from diet.       Follow up again in 6 weeks.        CC:   NIMA Frankel

## 2022-01-04 ENCOUNTER — OFFICE VISIT (OUTPATIENT)
Dept: MEDICAL GROUP | Facility: PHYSICIAN GROUP | Age: 52
End: 2022-01-04
Payer: COMMERCIAL

## 2022-01-04 VITALS
HEART RATE: 84 BPM | BODY MASS INDEX: 31.91 KG/M2 | HEIGHT: 62 IN | WEIGHT: 173.4 LBS | SYSTOLIC BLOOD PRESSURE: 126 MMHG | TEMPERATURE: 98.6 F | OXYGEN SATURATION: 97 % | RESPIRATION RATE: 20 BRPM | DIASTOLIC BLOOD PRESSURE: 70 MMHG

## 2022-01-04 DIAGNOSIS — E78.2 MIXED DYSLIPIDEMIA: ICD-10-CM

## 2022-01-04 DIAGNOSIS — E11.65 TYPE 2 DIABETES MELLITUS WITH HYPERGLYCEMIA, WITHOUT LONG-TERM CURRENT USE OF INSULIN (HCC): ICD-10-CM

## 2022-01-04 LAB
HBA1C MFR BLD: 7.4 % (ref 0–5.6)
INT CON NEG: ABNORMAL
INT CON POS: ABNORMAL

## 2022-01-04 PROCEDURE — 99214 OFFICE O/P EST MOD 30 MIN: CPT | Performed by: STUDENT IN AN ORGANIZED HEALTH CARE EDUCATION/TRAINING PROGRAM

## 2022-01-04 PROCEDURE — 83036 HEMOGLOBIN GLYCOSYLATED A1C: CPT | Performed by: STUDENT IN AN ORGANIZED HEALTH CARE EDUCATION/TRAINING PROGRAM

## 2022-01-04 RX ORDER — BLOOD SUGAR DIAGNOSTIC
1 STRIP MISCELLANEOUS 2 TIMES DAILY
Qty: 180 STRIP | Refills: 3 | Status: SHIPPED | OUTPATIENT
Start: 2022-01-04 | End: 2022-10-27 | Stop reason: SDUPTHER

## 2022-01-04 ASSESSMENT — PATIENT HEALTH QUESTIONNAIRE - PHQ9: CLINICAL INTERPRETATION OF PHQ2 SCORE: 0

## 2022-01-04 ASSESSMENT — FIBROSIS 4 INDEX: FIB4 SCORE: 1.57

## 2022-01-04 NOTE — PROGRESS NOTES
CC:  Diagnoses of Type 2 diabetes mellitus with hyperglycemia, without long-term current use of insulin (HCC) and Mixed dyslipidemia were pertinent to this visit.    HISTORY OF THE PRESENT ILLNESS: Patient is a 51 y.o. female. This pleasant patient is here today to discuss:    1.  Type 2 diabetes mellitus with hyperglycemia without long-term current use of insulin (HCC)  She has had recent consultation with pharmacy for her uncontrolled diabetes.  They have added Actos 30 mg once daily and increased her dose of Trulicity.  She continues on metformin 1000 mg B ID and Jardiance 25 mg daily.    2.  Mixed hyperlipidemia  Patient continue simvastatin 20 mg daily without side effect of treatment.  She did get her repeat lipid profile on 12/29/2021 which shows drastically improved cholesterol profile.        No problem-specific Assessment & Plan notes found for this encounter.      Current Outpatient Medications Ordered in Epic   Medication Sig Dispense Refill   • glucose blood (RELION TRUE METRIX TEST STRIPS) strip 1 Strip by Other route 2 times a day. 180 Strip 3   • Dulaglutide (TRULICITY) 3 MG/0.5ML Solution Pen-injector Inject 3 mg under the skin every 7 days. 4 mL 3   • pioglitazone (ACTOS) 30 MG Tab Take 1 Tablet by mouth every day. 30 Tablet 3   • Empagliflozin (JARDIANCE) 25 MG Tab Take one tablet by mouth every day 30 Tablet 3   • celecoxib (CELEBREX) 200 MG Cap Take 1 Capsule by mouth 2 times a day. 60 Capsule 3   • losartan (COZAAR) 25 MG Tab Take 1 Tablet by mouth every day. 30 Tablet 3   • simvastatin (ZOCOR) 20 MG Tab Take 1 tablet by mouth every evening. 30 tablet 11   • metformin (GLUCOPHAGE) 1000 MG tablet Take 1 tablet by mouth 2 times a day with meals. 180 tablet 3   • fenofibrate (TRICOR) 145 MG Tab Take 1 tablet by mouth every day. 90 tablet 3   • atenolol (TENORMIN) 50 MG Tab Take 1 tablet by mouth every day. 90 tablet 3   • amLODIPine (NORVASC) 10 MG Tab Take 1 tablet by mouth every day. 90 tablet 3  "  • cetirizine (KLS ALLER-CIPRIANO) 10 MG Tab Take 10 mg by mouth every day.     • Multiple Vitamins-Minerals (MULTIVITAMIN ADULT PO) Take  by mouth.     • Multiple Vitamins-Minerals (EYE VITAMINS PO) Take  by mouth.       No current Epic-ordered facility-administered medications on file.         Objective:     Exam: /70 (BP Location: Left arm, Patient Position: Sitting, BP Cuff Size: Adult)   Pulse 84   Temp 37 °C (98.6 °F) (Temporal)   Resp 20   Ht 1.575 m (5' 2\")   Wt 78.7 kg (173 lb 6.4 oz)   SpO2 97%  Body mass index is 31.72 kg/m².    General: Normal appearing. No distress.  Pulmonary: Clear to ausculation.  Normal effort. No rales, ronchi, or wheezing.  Cardiovascular: Regular rate and rhythm without murmur. Radial pulses are intact and equal bilaterally.  Skin: Warm and dry.  No obvious lesions.    A chaperone was offered to the patient during today's exam. Patient declined chaperone.    Labs:   12/29/2021:  -Lipid profile, total cholesterol 170, triglycerides 294, HDL 36, LDL 75.    1/4/2022:  -Hemoglobin A1c 7.4    Assessment & Plan:   51 y.o. female with the following -    1. Type 2 diabetes mellitus with hyperglycemia, without long-term current use of insulin (HCC)  -Chronic, unstable.  We are still above A1c goal.  -Continue Metformin 1000 mg twice daily.  -Continue Trulicity 3 mg every 7 days.  -Actos 30 mg daily.  -Jardiance 25 mg daily.  - POCT  A1C in 3 months time.  An order has been placed for the patient today.    2. Mixed dyslipidemia  - Chronic, stable.  ASCVD score based on most recent lipid profile on today's blood pressure is 4.9%.  -Continue simvastatin 20 mg daily.      Return in about 1 year (around 1/4/2023).    Please note that this dictation was created using voice recognition software. I have made every reasonable attempt to correct obvious errors, but I expect that there are errors of grammar and possibly content that I did not discover before finalizing the note.    Lee" Brandin ESCALERA 1/4/2022

## 2022-01-12 ENCOUNTER — HOSPITAL ENCOUNTER (OUTPATIENT)
Dept: RADIOLOGY | Facility: MEDICAL CENTER | Age: 52
End: 2022-01-12
Attending: STUDENT IN AN ORGANIZED HEALTH CARE EDUCATION/TRAINING PROGRAM
Payer: COMMERCIAL

## 2022-01-12 DIAGNOSIS — Z12.31 ENCOUNTER FOR SCREENING MAMMOGRAM FOR MALIGNANT NEOPLASM OF BREAST: ICD-10-CM

## 2022-01-12 PROCEDURE — 77063 BREAST TOMOSYNTHESIS BI: CPT

## 2022-01-28 RX ORDER — LOSARTAN POTASSIUM 25 MG/1
25 TABLET ORAL DAILY
Qty: 30 TABLET | Refills: 11 | Status: SHIPPED | OUTPATIENT
Start: 2022-01-28 | End: 2023-01-23

## 2022-01-31 ENCOUNTER — NON-PROVIDER VISIT (OUTPATIENT)
Dept: MEDICAL GROUP | Facility: PHYSICIAN GROUP | Age: 52
End: 2022-01-31
Payer: COMMERCIAL

## 2022-01-31 VITALS — WEIGHT: 173 LBS | BODY MASS INDEX: 31.83 KG/M2 | HEIGHT: 62 IN

## 2022-01-31 PROCEDURE — 99401 PREV MED CNSL INDIV APPRX 15: CPT | Performed by: FAMILY MEDICINE

## 2022-01-31 RX ORDER — DULAGLUTIDE 4.5 MG/.5ML
0.5 INJECTION, SOLUTION SUBCUTANEOUS
Qty: 6 ML | Refills: 1 | Status: SHIPPED | OUTPATIENT
Start: 2022-01-31 | End: 2022-03-22

## 2022-01-31 ASSESSMENT — FIBROSIS 4 INDEX: FIB4 SCORE: 1.57

## 2022-01-31 NOTE — PROGRESS NOTES
Patient Consult Note  [id number]  01/31/22    Primary care physician: NIMA Frankel    Reason for consult: Management of Uncontrolled Type 2 Diabetes    HPI:  Sanna Yuen is a 50 y.o. old patient who comes in today for evaluation of above stated problem.    Most Recent HbA1c:   Lab Results   Component Value Date/Time    HBA1C 7.4 (A) 01/04/2022 03:02 PM       Lab Results   Component Value Date/Time    CHOLSTRLTOT 170 12/29/2021 06:14 AM    LDL 75 12/29/2021 06:14 AM    HDL 36 (A) 12/29/2021 06:14 AM    TRIGLYCERIDE 294 (H) 12/29/2021 06:14 AM       Lab Results   Component Value Date/Time    SODIUM 138 06/23/2021 07:23 AM    POTASSIUM 4.5 06/23/2021 07:23 AM    CHLORIDE 103 06/23/2021 07:23 AM    CO2 22 06/23/2021 07:23 AM    GLUCOSE 174 (H) 06/23/2021 07:23 AM    BUN 18 06/23/2021 07:23 AM    CREATININE 0.57 06/23/2021 07:23 AM     Lab Results   Component Value Date/Time    ALKPHOSPHAT 42 06/23/2021 07:23 AM    ASTSGOT 26 06/23/2021 07:23 AM    ALTSGPT 22 06/23/2021 07:23 AM    TBILIRUBIN 0.2 06/23/2021 07:23 AM          Diabetes Medication History and Current Regimen  Metformin: 1000mg BID   SGLT-2 Inhibitor:  Jardiance 25mg once daily  trulicity 3mg every 7 days    actos 30mg once daily     Pt has home glucometer and proper testing technique - yes    Pt reports blood sugars:   Before Breakfast:  Most <140-130    Hypoglycemia awareness - yes  Nocturnal hypoglycemia- denies  Hypoglycemia:  None    Pt's treatment of Hypoglycemia - juice  - 15:15 Rule    Current Exercise - moderate walking- with her current job she walks 5 x per week about 8000 steps. Patient has bad hip, so does not do much outside of working.   Exercise Goal - elevated heartrate reaching a goal of 150 min/wk    Dietary - Common Adult     Pt reports eating: less sweets (has omitted candy, ice cream, cookies etc)   Patient works beginning at 4am so her diet varies. She still eats pizza, pasta, sandwiches, pupusa, soup,  chicken    Foot Exam:  Monofilament exam - annually and also self examines daily    Preventative Management  BP regimen (ACE/ARB) - Losartan 25mg QD  ASA - n/a  Statin - Simvastatin 20mg QD  Last Retinal Scan - annually - due 2022   Last Foot Exam - annually - due 2022  Last A1c -   Lab Results   Component Value Date/Time    HBA1C 7.4 (A) 01/04/2022 03:02 PM      Last Microalbuminuria - ordered     updated caregaps    Past Medical History:  Patient Active Problem List    Diagnosis Date Noted   • Chronic right shoulder pain 11/01/2021   • Osteoarthritis of hip 09/13/2021   • Type 2 diabetes mellitus with hyperglycemia, without long-term current use of insulin (HCC) 07/28/2021   • Essential hypertension 07/28/2021   • Mixed dyslipidemia 07/28/2021   • Hot flashes due to menopause 07/28/2021   • Right leg injury 07/28/2021   • Class 1 obesity due to excess calories with serious comorbidity and body mass index (BMI) of 31.0 to 31.9 in adult 07/28/2021   • Cigarette smoker one half pack a day or less 07/28/2021       Past Surgical History:  Past Surgical History:   Procedure Laterality Date   • CHOLECYSTECTOMY     • PRIMARY C SECTION     • TUBAL COAGULATION LAPAROSCOPIC BILATERAL         Allergies:  Lipitor [atorvastatin], Niacin, and Penicillins    Social History:  Social History     Socioeconomic History   • Marital status: Single     Spouse name: Not on file   • Number of children: Not on file   • Years of education: Not on file   • Highest education level: 12th grade   Occupational History   • Not on file   Tobacco Use   • Smoking status: Current Every Day Smoker     Packs/day: 0.50     Years: 10.00     Pack years: 5.00     Types: Cigarettes   • Smokeless tobacco: Never Used   Vaping Use   • Vaping Use: Never used   Substance and Sexual Activity   • Alcohol use: Yes     Comment: 3-5 times a year one drink   • Drug use: Never   • Sexual activity: Yes     Partners: Male     Birth control/protection: Surgical      Comment: one partner    Other Topics Concern   • Not on file   Social History Narrative   • Not on file     Social Determinants of Health     Financial Resource Strain: High Risk   • Difficulty of Paying Living Expenses: Hard   Food Insecurity: Food Insecurity Present   • Worried About Running Out of Food in the Last Year: Sometimes true   • Ran Out of Food in the Last Year: Patient refused   Transportation Needs: No Transportation Needs   • Lack of Transportation (Medical): No   • Lack of Transportation (Non-Medical): No   Physical Activity: Unknown   • Days of Exercise per Week: 5 days   • Minutes of Exercise per Session: Patient refused   Stress: No Stress Concern Present   • Feeling of Stress : Not at all   Social Connections: Moderately Isolated   • Frequency of Communication with Friends and Family: More than three times a week   • Frequency of Social Gatherings with Friends and Family: More than three times a week   • Attends Tenriism Services: 1 to 4 times per year   • Active Member of Clubs or Organizations: No   • Attends Club or Organization Meetings: Patient refused   • Marital Status:    Intimate Partner Violence:    • Fear of Current or Ex-Partner: Not on file   • Emotionally Abused: Not on file   • Physically Abused: Not on file   • Sexually Abused: Not on file   Housing Stability: Low Risk    • Unable to Pay for Housing in the Last Year: No   • Number of Places Lived in the Last Year: 1   • Unstable Housing in the Last Year: No       Family History:  Family History   Problem Relation Age of Onset   • Diabetes Sister    • Hypertension Sister    • Thyroid Sister    • Cancer Mother         skin cancer   • Hypertension Mother    • Thyroid Mother    • Hypertension Father    • Thyroid Father    • Hyperlipidemia Father    • Glaucoma Maternal Grandmother    • Cancer Paternal Grandfather         throat cancer   • Diabetes Sister    • Diabetes Son        Medications:    Current Outpatient  "Medications:   •  Dulaglutide (TRULICITY) 4.5 MG/0.5ML Solution Pen-injector, Inject 0.5 mg under the skin every 7 days., Disp: 6 mL, Rfl: 1  •  losartan (COZAAR) 25 MG Tab, Take 1 Tablet by mouth every day., Disp: 30 Tablet, Rfl: 11  •  glucose blood (RELION TRUE METRIX TEST STRIPS) strip, 1 Strip by Other route 2 times a day., Disp: 180 Strip, Rfl: 3  •  pioglitazone (ACTOS) 30 MG Tab, Take 1 Tablet by mouth every day., Disp: 30 Tablet, Rfl: 3  •  Empagliflozin (JARDIANCE) 25 MG Tab, Take one tablet by mouth every day, Disp: 30 Tablet, Rfl: 3  •  celecoxib (CELEBREX) 200 MG Cap, Take 1 Capsule by mouth 2 times a day., Disp: 60 Capsule, Rfl: 3  •  simvastatin (ZOCOR) 20 MG Tab, Take 1 tablet by mouth every evening., Disp: 30 tablet, Rfl: 11  •  metformin (GLUCOPHAGE) 1000 MG tablet, Take 1 tablet by mouth 2 times a day with meals., Disp: 180 tablet, Rfl: 3  •  fenofibrate (TRICOR) 145 MG Tab, Take 1 tablet by mouth every day., Disp: 90 tablet, Rfl: 3  •  atenolol (TENORMIN) 50 MG Tab, Take 1 tablet by mouth every day., Disp: 90 tablet, Rfl: 3  •  amLODIPine (NORVASC) 10 MG Tab, Take 1 tablet by mouth every day., Disp: 90 tablet, Rfl: 3  •  cetirizine (KLS ALLER-CIPRIANO) 10 MG Tab, Take 10 mg by mouth every day., Disp: , Rfl:   •  Multiple Vitamins-Minerals (MULTIVITAMIN ADULT PO), Take  by mouth., Disp: , Rfl:   •  Multiple Vitamins-Minerals (EYE VITAMINS PO), Take  by mouth., Disp: , Rfl:     Labs: Reviewed    Physical Examination:  Vital signs: Ht 1.575 m (5' 2\")   Wt 78.5 kg (173 lb)   BMI 31.64 kg/m²  Body mass index is 31.64 kg/m².    Assessment and Plan:    1. DM2  · Fasting blood sugars slightly above goal  · A1c slightly above goal  · will increase Trulicity  · Upcoming surgery, goal A1c less than 7.4.  · LDL slightly above goal of 70    - Medication changes   · Metformin: 1000mg BID   · SGLT-2 Inhibitor:  Jardiance 25mg once daily  · Increase: trulicity 4.5mg every 7 days   · Actos 30 mg once daily     - " Lifestyle changes   - Patient will continue to walk during work and will try to increase movement outside of work as tolerated.   Patient encouraged to continue to make healthier food choices and substitutions when able and to cut down on carbs in general.       Follow up again in 8 weeks.        CC:   RUSTAM Frankel.

## 2022-02-22 DIAGNOSIS — E11.9 TYPE 2 DIABETES MELLITUS WITHOUT COMPLICATION, WITHOUT LONG-TERM CURRENT USE OF INSULIN (HCC): ICD-10-CM

## 2022-02-22 RX ORDER — EMPAGLIFLOZIN 25 MG/1
TABLET, FILM COATED ORAL
Qty: 30 TABLET | Refills: 3 | Status: SHIPPED | OUTPATIENT
Start: 2022-02-22 | End: 2022-06-15

## 2022-02-24 RX ORDER — CELECOXIB 200 MG/1
200 CAPSULE ORAL 2 TIMES DAILY
Qty: 60 CAPSULE | Refills: 3 | OUTPATIENT
Start: 2022-02-24

## 2022-02-25 RX ORDER — CELECOXIB 200 MG/1
200 CAPSULE ORAL 2 TIMES DAILY
Qty: 60 CAPSULE | Refills: 0 | OUTPATIENT
Start: 2022-02-25

## 2022-03-22 RX ORDER — DULAGLUTIDE 4.5 MG/.5ML
0.5 INJECTION, SOLUTION SUBCUTANEOUS
Qty: 4 ML | Refills: 0 | Status: SHIPPED | OUTPATIENT
Start: 2022-03-22 | End: 2022-04-19

## 2022-03-28 ENCOUNTER — NON-PROVIDER VISIT (OUTPATIENT)
Dept: MEDICAL GROUP | Facility: PHYSICIAN GROUP | Age: 52
End: 2022-03-28
Payer: COMMERCIAL

## 2022-03-28 VITALS
HEIGHT: 62 IN | SYSTOLIC BLOOD PRESSURE: 132 MMHG | HEART RATE: 81 BPM | BODY MASS INDEX: 30.91 KG/M2 | WEIGHT: 168 LBS | DIASTOLIC BLOOD PRESSURE: 94 MMHG

## 2022-03-28 PROCEDURE — 99401 PREV MED CNSL INDIV APPRX 15: CPT | Performed by: FAMILY MEDICINE

## 2022-03-28 ASSESSMENT — FIBROSIS 4 INDEX: FIB4 SCORE: 1.57

## 2022-03-28 NOTE — PROGRESS NOTES
Patient Consult Note  250pm -301    Primary care physician: NIMA Frankel    Reason for consult: Management of Uncontrolled Type 2 Diabetes    HPI:  Sanna Yuen is a 50 y.o. old patient who comes in today for evaluation of above stated problem.    Most Recent HbA1c:   Lab Results   Component Value Date/Time    HBA1C 7.4 (A) 01/04/2022 03:02 PM       Lab Results   Component Value Date/Time    CHOLSTRLTOT 170 12/29/2021 06:14 AM    LDL 75 12/29/2021 06:14 AM    HDL 36 (A) 12/29/2021 06:14 AM    TRIGLYCERIDE 294 (H) 12/29/2021 06:14 AM       Lab Results   Component Value Date/Time    SODIUM 138 06/23/2021 07:23 AM    POTASSIUM 4.5 06/23/2021 07:23 AM    CHLORIDE 103 06/23/2021 07:23 AM    CO2 22 06/23/2021 07:23 AM    GLUCOSE 174 (H) 06/23/2021 07:23 AM    BUN 18 06/23/2021 07:23 AM    CREATININE 0.57 06/23/2021 07:23 AM     Lab Results   Component Value Date/Time    ALKPHOSPHAT 42 06/23/2021 07:23 AM    ASTSGOT 26 06/23/2021 07:23 AM    ALTSGPT 22 06/23/2021 07:23 AM    TBILIRUBIN 0.2 06/23/2021 07:23 AM          Diabetes Medication History and Current Regimen  Metformin: 1000mg BID   SGLT-2 Inhibitor:  Jardiance 25mg once daily  trulicity 4.5mg every 7 days    actos 30mg once daily     Pt has home glucometer and proper testing technique - yes    Pt reports blood sugars:   112-145    Hypoglycemia awareness - yes  Nocturnal hypoglycemia- denies  Hypoglycemia:  None    Pt's treatment of Hypoglycemia - juice  - 15:15 Rule    Current Exercise - moderate walking- with her current job she walks 5 x per week about 8000 steps. Patient has bad hip, so does not do much outside of working.   Exercise Goal - elevated heartrate reaching a goal of 150 min/wk    Dietary - Common Adult     Pt reports eating: less sweets (has omitted candy, ice cream, cookies etc)   Patient works beginning at 4am so her diet varies. She still eats pizza, pasta, sandwiches, pupusa, soup, chicken    Foot Exam:  Monofilament exam -  annually and also self examines daily    Preventative Management  BP regimen (ACE/ARB) - Losartan 25mg QD  ASA - n/a  Statin - Simvastatin 20mg QD  Last Retinal Scan - annually - due 2022   Last Foot Exam - annually - due 2022  Last A1c -   Lab Results   Component Value Date/Time    HBA1C 7.4 (A) 01/04/2022 03:02 PM      Last Microalbuminuria - ordered     updated caregaps    Past Medical History:  Patient Active Problem List    Diagnosis Date Noted   • Chronic right shoulder pain 11/01/2021   • Osteoarthritis of hip 09/13/2021   • Type 2 diabetes mellitus with hyperglycemia, without long-term current use of insulin (HCC) 07/28/2021   • Essential hypertension 07/28/2021   • Mixed dyslipidemia 07/28/2021   • Hot flashes due to menopause 07/28/2021   • Right leg injury 07/28/2021   • Class 1 obesity due to excess calories with serious comorbidity and body mass index (BMI) of 31.0 to 31.9 in adult 07/28/2021   • Cigarette smoker one half pack a day or less 07/28/2021       Past Surgical History:  Past Surgical History:   Procedure Laterality Date   • CHOLECYSTECTOMY     • PRIMARY C SECTION     • TUBAL COAGULATION LAPAROSCOPIC BILATERAL         Allergies:  Lipitor [atorvastatin], Niacin, and Penicillins    Social History:  Social History     Socioeconomic History   • Marital status: Single     Spouse name: Not on file   • Number of children: Not on file   • Years of education: Not on file   • Highest education level: 12th grade   Occupational History   • Not on file   Tobacco Use   • Smoking status: Current Every Day Smoker     Packs/day: 0.50     Years: 10.00     Pack years: 5.00     Types: Cigarettes   • Smokeless tobacco: Never Used   Vaping Use   • Vaping Use: Never used   Substance and Sexual Activity   • Alcohol use: Yes     Comment: 3-5 times a year one drink   • Drug use: Never   • Sexual activity: Yes     Partners: Male     Birth control/protection: Surgical     Comment: one partner    Other Topics Concern   •  Not on file   Social History Narrative   • Not on file     Social Determinants of Health     Financial Resource Strain: High Risk   • Difficulty of Paying Living Expenses: Hard   Food Insecurity: Food Insecurity Present   • Worried About Running Out of Food in the Last Year: Sometimes true   • Ran Out of Food in the Last Year: Patient refused   Transportation Needs: No Transportation Needs   • Lack of Transportation (Medical): No   • Lack of Transportation (Non-Medical): No   Physical Activity: Unknown   • Days of Exercise per Week: 5 days   • Minutes of Exercise per Session: Patient refused   Stress: No Stress Concern Present   • Feeling of Stress : Not at all   Social Connections: Moderately Isolated   • Frequency of Communication with Friends and Family: More than three times a week   • Frequency of Social Gatherings with Friends and Family: More than three times a week   • Attends Oriental orthodox Services: 1 to 4 times per year   • Active Member of Clubs or Organizations: No   • Attends Club or Organization Meetings: Patient refused   • Marital Status:    Intimate Partner Violence: Not on file   Housing Stability: Low Risk    • Unable to Pay for Housing in the Last Year: No   • Number of Places Lived in the Last Year: 1   • Unstable Housing in the Last Year: No       Family History:  Family History   Problem Relation Age of Onset   • Diabetes Sister    • Hypertension Sister    • Thyroid Sister    • Cancer Mother         skin cancer   • Hypertension Mother    • Thyroid Mother    • Hypertension Father    • Thyroid Father    • Hyperlipidemia Father    • Glaucoma Maternal Grandmother    • Cancer Paternal Grandfather         throat cancer   • Diabetes Sister    • Diabetes Son        Medications:    Current Outpatient Medications:   •  pioglitazone (ACTOS) 30 MG Tab, TAKE ONE TABLET BY MOUTH ONE TIME DAILY, Disp: 90 Tablet, Rfl: 3  •  TRULICITY 4.5 MG/0.5ML Solution Pen-injector, INJECT 0.5 MG UNDER THE SKIN EVERY  7 DAYS., Disp: 4 mL, Rfl: 0  •  Empagliflozin (JARDIANCE) 25 MG Tab, TAKE ONE TABLET BY MOUTH ONE TIME DAILY, Disp: 30 Tablet, Rfl: 3  •  losartan (COZAAR) 25 MG Tab, Take 1 Tablet by mouth every day., Disp: 30 Tablet, Rfl: 11  •  glucose blood (RELION TRUE METRIX TEST STRIPS) strip, 1 Strip by Other route 2 times a day., Disp: 180 Strip, Rfl: 3  •  celecoxib (CELEBREX) 200 MG Cap, Take 1 Capsule by mouth 2 times a day., Disp: 60 Capsule, Rfl: 3  •  simvastatin (ZOCOR) 20 MG Tab, Take 1 tablet by mouth every evening., Disp: 30 tablet, Rfl: 11  •  metformin (GLUCOPHAGE) 1000 MG tablet, Take 1 tablet by mouth 2 times a day with meals., Disp: 180 tablet, Rfl: 3  •  fenofibrate (TRICOR) 145 MG Tab, Take 1 tablet by mouth every day., Disp: 90 tablet, Rfl: 3  •  atenolol (TENORMIN) 50 MG Tab, Take 1 tablet by mouth every day., Disp: 90 tablet, Rfl: 3  •  amLODIPine (NORVASC) 10 MG Tab, Take 1 tablet by mouth every day., Disp: 90 tablet, Rfl: 3  •  cetirizine (KLS ALLER-CIPRIANO) 10 MG Tab, Take 10 mg by mouth every day., Disp: , Rfl:   •  Multiple Vitamins-Minerals (MULTIVITAMIN ADULT PO), Take  by mouth., Disp: , Rfl:   •  Multiple Vitamins-Minerals (EYE VITAMINS PO), Take  by mouth., Disp: , Rfl:     Labs: Reviewed    Physical Examination:  Vital signs: There were no vitals taken for this visit. There is no height or weight on file to calculate BMI.    Assessment and Plan:    1. DM2  · Fasting blood sugars slightly above goal  · A1c slightly above goal  · Upcoming surgery, goal A1c less than 7.4.  · LDL slightly above goal of 70    - Medication changes   · Metformin: 1000mg BID   · SGLT-2 Inhibitor:  Jardiance 25mg once daily  ·  trulicity 4.5mg every 7 days   · Actos 30 mg once daily     - Lifestyle changes   - Patient will continue to walk during work and will try to increase movement outside of work as tolerated.   Patient encouraged to continue to make healthier food choices and substitutions when able and to cut down on  carbs in general.       Follow up again in 8 weeks.        CC:   NIMA Frankel

## 2022-04-13 ENCOUNTER — HOSPITAL ENCOUNTER (OUTPATIENT)
Dept: LAB | Facility: MEDICAL CENTER | Age: 52
End: 2022-04-13
Attending: STUDENT IN AN ORGANIZED HEALTH CARE EDUCATION/TRAINING PROGRAM
Payer: COMMERCIAL

## 2022-04-13 DIAGNOSIS — E11.65 TYPE 2 DIABETES MELLITUS WITH HYPERGLYCEMIA, WITHOUT LONG-TERM CURRENT USE OF INSULIN (HCC): ICD-10-CM

## 2022-04-13 LAB
EST. AVERAGE GLUCOSE BLD GHB EST-MCNC: 137 MG/DL
HBA1C MFR BLD: 6.4 % (ref 4–5.6)

## 2022-04-13 PROCEDURE — 36415 COLL VENOUS BLD VENIPUNCTURE: CPT

## 2022-04-13 PROCEDURE — 83036 HEMOGLOBIN GLYCOSYLATED A1C: CPT

## 2022-04-18 NOTE — PROGRESS NOTES
"Subjective:   Sanna Yuen  is a 49 y.o. female who presents for Leg Pain (x 5 months, Rt. upper leg pain and getting worse x 1 month after falling)        Patient comes clinic describing pain to right anterior thigh.  She notes she had a trip and fall injury months ago when she caught her right foot and fell forward.  At that time she injured her arm and was more focused on that injury.  Since then she is noticed some pain to the front of her thigh particularly with lifting her knee and moving her leg forward.  She denies a history of surgery or significant injury to this area.  She was treated with naproxen from her primary care provider with minimal improved pain.  She denies limping but complains of persistent discomfort.  Of note she does take a statin drug.  She denies contralateral symptoms.  She denies weakness or numbness.    Leg Pain   Pertinent negatives include no chills, fever, nausea, rash, vomiting or weakness.     Review of Systems   Constitutional: Negative for chills and fever.   Respiratory: Negative for shortness of breath and wheezing.    Gastrointestinal: Negative for diarrhea, nausea and vomiting.   Musculoskeletal: Negative for joint pain.        Pain anterior thigh, right     Skin: Negative for rash.   Neurological: Negative for tingling, sensory change and weakness.     Allergies   Allergen Reactions   • Niacin Itching     Flushed face   • Penicillins Itching and Unspecified     Mouth sores      Objective:   /82 (BP Location: Left arm, Patient Position: Sitting, BP Cuff Size: Adult)   Pulse 68   Temp 36.4 °C (97.6 °F) (Temporal)   Resp 16   Ht 1.575 m (5' 2\")   Wt 91.6 kg (202 lb)   SpO2 97%   BMI 36.95 kg/m²   Physical Exam   Constitutional: She is oriented to person, place, and time. She appears well-developed and well-nourished. No distress.   HENT:   Head: Normocephalic and atraumatic.   Right Ear: External ear normal.   Left Ear: External ear normal.   Nose: Nose normal.   "   Eyes: Conjunctivae and lids are normal. Right eye exhibits no discharge. Left eye exhibits no discharge. No scleral icterus.   Neck: Neck supple.   Pulmonary/Chest: Effort normal. No respiratory distress.   Musculoskeletal: Normal range of motion.        Right hip: She exhibits tenderness. She exhibits normal range of motion, normal strength, no bony tenderness, no swelling, no crepitus, no deformity and no laceration.        Legs:  Resisted strength testing in all planes of bilateral hips with reproduced pain with resisted forward flexion right hip, no other pain and full strength in forward flexion extension abduction and adduction bilaterally; no erythema ecchymosis or edema, no effusions   Neurological: She is alert and oriented to person, place, and time. She is not disoriented.   Skin: Skin is warm and dry. She is not diaphoretic. No erythema. No pallor.   Psychiatric: Her speech is normal and behavior is normal.   Nursing note and vitals reviewed.        Assessment/Plan:   1. Pain of left lower extremity    2. Muscle strain of right thigh, initial encounter  - methylPREDNISolone (MEDROL DOSEPAK) 4 MG Tablet Therapy Pack; Follow schedule on package instructions.  Dispense: 21 Tab; Refill: 0    Other orders  - fenofibrate (TRICOR) 145 MG Tab; Take 145 mg by mouth.; Refill: 3  Recommend conservative care, rest, ice, elevation, work on gentle ROM exercises, with no improvement of pain follow-up with primary care to check of statin drug may be contributing, if desired contact clinic for sports med referral return to clinic with lack of resolution or progression of symptoms.  Cautioned regarding potential side effects of steroid, avoid nsaids while using    Differential diagnosis, natural history, supportive care, and indications for immediate follow-up discussed.        Equal and normal pulses (carotid, femoral, dorsalis pedis)

## 2022-04-19 RX ORDER — DULAGLUTIDE 4.5 MG/.5ML
0.5 INJECTION, SOLUTION SUBCUTANEOUS
Qty: 4 ML | Refills: 0 | Status: SHIPPED | OUTPATIENT
Start: 2022-04-19 | End: 2022-05-23

## 2022-04-20 RX ORDER — CELECOXIB 200 MG/1
200 CAPSULE ORAL 2 TIMES DAILY
Qty: 60 CAPSULE | Refills: 0 | Status: SHIPPED | OUTPATIENT
Start: 2022-04-20 | End: 2023-09-27 | Stop reason: SDUPTHER

## 2022-05-09 ENCOUNTER — NON-PROVIDER VISIT (OUTPATIENT)
Dept: MEDICAL GROUP | Facility: PHYSICIAN GROUP | Age: 52
End: 2022-05-09
Payer: COMMERCIAL

## 2022-05-09 VITALS — HEART RATE: 101 BPM | WEIGHT: 173 LBS | BODY MASS INDEX: 30.65 KG/M2 | HEIGHT: 63 IN

## 2022-05-09 DIAGNOSIS — E11.65 TYPE 2 DIABETES MELLITUS WITH HYPERGLYCEMIA, WITHOUT LONG-TERM CURRENT USE OF INSULIN (HCC): ICD-10-CM

## 2022-05-09 DIAGNOSIS — E78.2 MIXED DYSLIPIDEMIA: ICD-10-CM

## 2022-05-09 PROCEDURE — 99999 PR NO CHARGE: CPT | Performed by: FAMILY MEDICINE

## 2022-05-09 ASSESSMENT — FIBROSIS 4 INDEX: FIB4 SCORE: 1.57

## 2022-05-09 NOTE — PROGRESS NOTES
Patient Consult Note  [id number]    Primary care physician: NIMA Frankel    Reason for consult: Management of Uncontrolled Type 2 Diabetes    HPI:  Sanna Yuen is a 50 y.o. old patient who comes in today for evaluation of above stated problem.    Most Recent HbA1c:   Lab Results   Component Value Date/Time    HBA1C 6.4 (H) 04/13/2022 06:25 AM       Lab Results   Component Value Date/Time    CHOLSTRLTOT 170 12/29/2021 06:14 AM    LDL 75 12/29/2021 06:14 AM    HDL 36 (A) 12/29/2021 06:14 AM    TRIGLYCERIDE 294 (H) 12/29/2021 06:14 AM       Lab Results   Component Value Date/Time    SODIUM 138 06/23/2021 07:23 AM    POTASSIUM 4.5 06/23/2021 07:23 AM    CHLORIDE 103 06/23/2021 07:23 AM    CO2 22 06/23/2021 07:23 AM    GLUCOSE 174 (H) 06/23/2021 07:23 AM    BUN 18 06/23/2021 07:23 AM    CREATININE 0.57 06/23/2021 07:23 AM     Lab Results   Component Value Date/Time    ALKPHOSPHAT 42 06/23/2021 07:23 AM    ASTSGOT 26 06/23/2021 07:23 AM    ALTSGPT 22 06/23/2021 07:23 AM    TBILIRUBIN 0.2 06/23/2021 07:23 AM          Diabetes Medication History and Current Regimen  Metformin: 1000mg BID   SGLT-2 Inhibitor:  Jardiance 25mg once daily  trulicity 4.5mg every 7 days    actos 30mg once daily     Pt has home glucometer and proper testing technique - yes    Pt reports blood sugars:       Hypoglycemia awareness - yes  Nocturnal hypoglycemia- denies  Hypoglycemia:  None    Pt's treatment of Hypoglycemia - juice  - 15:15 Rule    Current Exercise - moderate walking- with her current job she walks 5 x per week about 8000 steps. Patient has bad hip, so does not do much outside of working.   Exercise Goal - elevated heartrate reaching a goal of 150 min/wk    Dietary - Common Adult     Pt reports eating: less sweets (has omitted candy, ice cream, cookies etc)   Patient works beginning at 4am so her diet varies. She still eats pizza, pasta, sandwiches, pupusa, soup, chicken    Foot Exam:  Monofilament exam -  annually and also self examines daily    Preventative Management  BP regimen (ACE/ARB) - Losartan 25mg QD  ASA - n/a  Statin - Simvastatin 20mg QD  Last Retinal Scan - annually - due 2022   Last Foot Exam - annually - due 2022  Last A1c -   Lab Results   Component Value Date/Time    HBA1C 6.4 (H) 04/13/2022 06:25 AM      Last Microalbuminuria - ordered     updated caregaps    Past Medical History:  Patient Active Problem List    Diagnosis Date Noted   • Chronic right shoulder pain 11/01/2021   • Osteoarthritis of hip 09/13/2021   • Type 2 diabetes mellitus with hyperglycemia, without long-term current use of insulin (HCC) 07/28/2021   • Essential hypertension 07/28/2021   • Mixed dyslipidemia 07/28/2021   • Hot flashes due to menopause 07/28/2021   • Right leg injury 07/28/2021   • Class 1 obesity due to excess calories with serious comorbidity and body mass index (BMI) of 31.0 to 31.9 in adult 07/28/2021   • Cigarette smoker one half pack a day or less 07/28/2021       Past Surgical History:  Past Surgical History:   Procedure Laterality Date   • CHOLECYSTECTOMY     • PRIMARY C SECTION     • TUBAL COAGULATION LAPAROSCOPIC BILATERAL         Allergies:  Lipitor [atorvastatin], Niacin, and Penicillins    Social History:  Social History     Socioeconomic History   • Marital status: Single     Spouse name: Not on file   • Number of children: Not on file   • Years of education: Not on file   • Highest education level: 12th grade   Occupational History   • Not on file   Tobacco Use   • Smoking status: Current Every Day Smoker     Packs/day: 0.50     Years: 10.00     Pack years: 5.00     Types: Cigarettes   • Smokeless tobacco: Never Used   Vaping Use   • Vaping Use: Never used   Substance and Sexual Activity   • Alcohol use: Yes     Comment: 3-5 times a year one drink   • Drug use: Never   • Sexual activity: Yes     Partners: Male     Birth control/protection: Surgical     Comment: one partner    Other Topics Concern   •  Not on file   Social History Narrative   • Not on file     Social Determinants of Health     Financial Resource Strain: High Risk   • Difficulty of Paying Living Expenses: Hard   Food Insecurity: Food Insecurity Present   • Worried About Running Out of Food in the Last Year: Sometimes true   • Ran Out of Food in the Last Year: Patient refused   Transportation Needs: No Transportation Needs   • Lack of Transportation (Medical): No   • Lack of Transportation (Non-Medical): No   Physical Activity: Unknown   • Days of Exercise per Week: 5 days   • Minutes of Exercise per Session: Patient refused   Stress: No Stress Concern Present   • Feeling of Stress : Not at all   Social Connections: Moderately Isolated   • Frequency of Communication with Friends and Family: More than three times a week   • Frequency of Social Gatherings with Friends and Family: More than three times a week   • Attends Worship Services: 1 to 4 times per year   • Active Member of Clubs or Organizations: No   • Attends Club or Organization Meetings: Patient refused   • Marital Status:    Intimate Partner Violence: Not on file   Housing Stability: Low Risk    • Unable to Pay for Housing in the Last Year: No   • Number of Places Lived in the Last Year: 1   • Unstable Housing in the Last Year: No       Family History:  Family History   Problem Relation Age of Onset   • Diabetes Sister    • Hypertension Sister    • Thyroid Sister    • Cancer Mother         skin cancer   • Hypertension Mother    • Thyroid Mother    • Hypertension Father    • Thyroid Father    • Hyperlipidemia Father    • Glaucoma Maternal Grandmother    • Cancer Paternal Grandfather         throat cancer   • Diabetes Sister    • Diabetes Son        Medications:    Current Outpatient Medications:   •  celecoxib (CELEBREX) 200 MG Cap, Take 1 Capsule by mouth 2 times a day., Disp: 60 Capsule, Rfl: 0  •  metformin (GLUCOPHAGE) 1000 MG tablet, Take 1 Tablet by mouth 2 times a day.,  Disp: , Rfl:   •  naproxen (NAPROSYN) 500 MG Tab, 1 tablet with food or milk as needed, Disp: , Rfl:   •  triamcinolone acetonide (KENALOG-40) 40 MG/ML Suspension, 1.6 ml, Disp: , Rfl:   •  FERROUS SULFATE PO, 65 mg tablet one tablet once daily, Disp: , Rfl:   •  TRULICITY 4.5 MG/0.5ML Solution Pen-injector, INJECT 0.5 MG UNDER THE SKIN EVERY 7 DAYS., Disp: 4 mL, Rfl: 0  •  pioglitazone (ACTOS) 30 MG Tab, TAKE ONE TABLET BY MOUTH ONE TIME DAILY, Disp: 90 Tablet, Rfl: 3  •  Empagliflozin (JARDIANCE) 25 MG Tab, TAKE ONE TABLET BY MOUTH ONE TIME DAILY, Disp: 30 Tablet, Rfl: 3  •  losartan (COZAAR) 25 MG Tab, Take 1 Tablet by mouth every day., Disp: 30 Tablet, Rfl: 11  •  glucose blood (RELION TRUE METRIX TEST STRIPS) strip, 1 Strip by Other route 2 times a day., Disp: 180 Strip, Rfl: 3  •  simvastatin (ZOCOR) 20 MG Tab, Take 1 tablet by mouth every evening., Disp: 30 tablet, Rfl: 11  •  fenofibrate (TRICOR) 145 MG Tab, Take 1 tablet by mouth every day., Disp: 90 tablet, Rfl: 3  •  atenolol (TENORMIN) 50 MG Tab, Take 1 tablet by mouth every day., Disp: 90 tablet, Rfl: 3  •  amLODIPine (NORVASC) 10 MG Tab, Take 1 tablet by mouth every day., Disp: 90 tablet, Rfl: 3  •  cetirizine (KLS ALLER-CIPRIANO) 10 MG Tab, Take 10 mg by mouth every day., Disp: , Rfl:   •  Multiple Vitamins-Minerals (MULTIVITAMIN ADULT PO), Take  by mouth., Disp: , Rfl:   •  Multiple Vitamins-Minerals (EYE VITAMINS PO), Take  by mouth., Disp: , Rfl:     Labs: Reviewed    Physical Examination:  Vital signs: There were no vitals taken for this visit. There is no height or weight on file to calculate BMI.    Assessment and Plan:    1. DM2  · Fasting blood sugars at goal   · A1c at goal  · Upcoming surgery, goal A1c less than 7.4.  · LDL slightly above goal of 70    - Medication changes   · Cont. Metformin: 1000mg BID   · Thomas. SGLT-2 Inhibitor:  Jardiance 25mg once daily  ·  thomas. trulicity 4.5mg every 7 days   · Thomas. Actos 30 mg once daily     -  Lifestyle changes   - Patient will continue to walk during work and will try to increase movement outside of work as tolerated.   Patient encouraged to continue to make healthier food choices and substitutions when able and to cut down on carbs in general.       Follow up again in 16 weeks.        CC:   RUSTAM Frankel.

## 2022-05-12 PROBLEM — M16.9 OSTEOARTHROSIS, HIP: Status: ACTIVE | Noted: 2022-05-12

## 2022-05-23 RX ORDER — DULAGLUTIDE 4.5 MG/.5ML
INJECTION, SOLUTION SUBCUTANEOUS
Qty: 4 ML | Refills: 0 | Status: SHIPPED | OUTPATIENT
Start: 2022-05-23 | End: 2022-06-15

## 2022-06-15 DIAGNOSIS — E11.9 TYPE 2 DIABETES MELLITUS WITHOUT COMPLICATION, WITHOUT LONG-TERM CURRENT USE OF INSULIN (HCC): ICD-10-CM

## 2022-06-15 RX ORDER — EMPAGLIFLOZIN 25 MG/1
TABLET, FILM COATED ORAL
Qty: 30 TABLET | Refills: 3 | Status: SHIPPED | OUTPATIENT
Start: 2022-06-15 | End: 2022-10-27

## 2022-06-15 RX ORDER — DULAGLUTIDE 4.5 MG/.5ML
INJECTION, SOLUTION SUBCUTANEOUS
Qty: 4 ML | Refills: 0 | Status: SHIPPED | OUTPATIENT
Start: 2022-06-15 | End: 2022-07-11

## 2022-06-15 NOTE — TELEPHONE ENCOUNTER
Received request via: Pharmacy    Was the patient seen in the last year in this department? no    Does the patient have an active prescription (recently filled or refills available) for medication(s) requested? No

## 2022-07-11 RX ORDER — DULAGLUTIDE 4.5 MG/.5ML
INJECTION, SOLUTION SUBCUTANEOUS
Qty: 4 ML | Refills: 5 | Status: SHIPPED | OUTPATIENT
Start: 2022-07-11 | End: 2022-11-30

## 2022-07-13 ENCOUNTER — TELEPHONE (OUTPATIENT)
Dept: MEDICAL GROUP | Facility: PHYSICIAN GROUP | Age: 52
End: 2022-07-13
Payer: COMMERCIAL

## 2022-07-13 RX ORDER — SIMVASTATIN 20 MG
20 TABLET ORAL EVERY EVENING
Qty: 90 TABLET | Refills: 3 | Status: SHIPPED | OUTPATIENT
Start: 2022-07-13 | End: 2022-09-12

## 2022-07-13 NOTE — TELEPHONE ENCOUNTER
MEDICATION PRIOR AUTHORIZATION NEEDED:    1. Name of Medication: Trulicity    2. Requested By (Name of Pharmacy): CoverMyMeds     3. Is insurance on file current? yes    4. What is the name & phone number of the 3rd party payor? Lucas SMITH or Alternative?    Key: [id number]  Last Name: Alpa  : 1970

## 2022-07-14 NOTE — TELEPHONE ENCOUNTER
DOCUMENTATION OF PAR STATUS:    1. Name of Medication & Dose: trulicity     2. Name of Prescription Coverage Company & phone #: [email or web address]    3. Date Prior Auth Submitted: 07/14/22      4. What information was given to obtain insurance decision? ICD-10    5. Prior Auth Status? Pending    6. Patient Notified: no

## 2022-07-18 NOTE — TELEPHONE ENCOUNTER
FINAL PRIOR AUTHORIZATION STATUS:    1.  Name of Medication & Dose: Trulicity     2. Prior Auth Status: PA not Required; covered benefit    3. Action Taken: Pharmacy Notified: no Patient Notified: no

## 2022-07-27 RX ORDER — AMLODIPINE BESYLATE 10 MG/1
10 TABLET ORAL DAILY
Qty: 90 TABLET | Refills: 3 | Status: SHIPPED | OUTPATIENT
Start: 2022-07-27 | End: 2022-10-27 | Stop reason: SDUPTHER

## 2022-08-01 RX ORDER — ATENOLOL 50 MG/1
50 TABLET ORAL DAILY
Qty: 90 TABLET | Refills: 3 | Status: SHIPPED | OUTPATIENT
Start: 2022-08-01 | End: 2022-10-27 | Stop reason: SDUPTHER

## 2022-08-02 RX ORDER — FENOFIBRATE 145 MG/1
145 TABLET, COATED ORAL DAILY
Qty: 90 TABLET | Refills: 3 | Status: SHIPPED | OUTPATIENT
Start: 2022-08-02 | End: 2022-10-27 | Stop reason: SDUPTHER

## 2022-09-01 ENCOUNTER — HOSPITAL ENCOUNTER (OUTPATIENT)
Dept: LAB | Facility: MEDICAL CENTER | Age: 52
End: 2022-09-01
Attending: STUDENT IN AN ORGANIZED HEALTH CARE EDUCATION/TRAINING PROGRAM
Payer: COMMERCIAL

## 2022-09-01 ENCOUNTER — HOSPITAL ENCOUNTER (OUTPATIENT)
Dept: LAB | Facility: MEDICAL CENTER | Age: 52
End: 2022-09-01
Attending: ORTHOPAEDIC SURGERY
Payer: COMMERCIAL

## 2022-09-01 DIAGNOSIS — E78.2 MIXED DYSLIPIDEMIA: ICD-10-CM

## 2022-09-01 DIAGNOSIS — E11.65 TYPE 2 DIABETES MELLITUS WITH HYPERGLYCEMIA, WITHOUT LONG-TERM CURRENT USE OF INSULIN (HCC): ICD-10-CM

## 2022-09-01 LAB
ALBUMIN SERPL BCP-MCNC: 4.6 G/DL (ref 3.2–4.9)
ALBUMIN/GLOB SERPL: 1.8 G/DL
ALP SERPL-CCNC: 40 U/L (ref 30–99)
ALT SERPL-CCNC: 13 U/L (ref 2–50)
ANION GAP SERPL CALC-SCNC: 13 MMOL/L (ref 7–16)
AST SERPL-CCNC: 16 U/L (ref 12–45)
BILIRUB SERPL-MCNC: 0.2 MG/DL (ref 0.1–1.5)
BUN SERPL-MCNC: 25 MG/DL (ref 8–22)
CALCIUM SERPL-MCNC: 9.6 MG/DL (ref 8.5–10.5)
CHLORIDE SERPL-SCNC: 105 MMOL/L (ref 96–112)
CHOLEST SERPL-MCNC: 193 MG/DL (ref 100–199)
CO2 SERPL-SCNC: 23 MMOL/L (ref 20–33)
CREAT SERPL-MCNC: 0.54 MG/DL (ref 0.5–1.4)
CREAT UR-MCNC: 70.57 MG/DL
EST. AVERAGE GLUCOSE BLD GHB EST-MCNC: 137 MG/DL
FASTING STATUS PATIENT QL REPORTED: NORMAL
GFR SERPLBLD CREATININE-BSD FMLA CKD-EPI: 110 ML/MIN/1.73 M 2
GLOBULIN SER CALC-MCNC: 2.5 G/DL (ref 1.9–3.5)
GLUCOSE SERPL-MCNC: 126 MG/DL (ref 65–99)
HBA1C MFR BLD: 6.4 % (ref 4–5.6)
HDLC SERPL-MCNC: 52 MG/DL
LDLC SERPL CALC-MCNC: 109 MG/DL
MICROALBUMIN UR-MCNC: <1.2 MG/DL
MICROALBUMIN/CREAT UR: NORMAL MG/G (ref 0–30)
POTASSIUM SERPL-SCNC: 4.2 MMOL/L (ref 3.6–5.5)
PROT SERPL-MCNC: 7.1 G/DL (ref 6–8.2)
SODIUM SERPL-SCNC: 141 MMOL/L (ref 135–145)
TRIGL SERPL-MCNC: 158 MG/DL (ref 0–149)

## 2022-09-01 PROCEDURE — 82570 ASSAY OF URINE CREATININE: CPT

## 2022-09-01 PROCEDURE — 36415 COLL VENOUS BLD VENIPUNCTURE: CPT

## 2022-09-01 PROCEDURE — 83036 HEMOGLOBIN GLYCOSYLATED A1C: CPT

## 2022-09-01 PROCEDURE — 80061 LIPID PANEL: CPT

## 2022-09-01 PROCEDURE — 82043 UR ALBUMIN QUANTITATIVE: CPT

## 2022-09-01 PROCEDURE — 80053 COMPREHEN METABOLIC PANEL: CPT

## 2022-09-12 ENCOUNTER — NON-PROVIDER VISIT (OUTPATIENT)
Dept: MEDICAL GROUP | Facility: PHYSICIAN GROUP | Age: 52
End: 2022-09-12
Payer: COMMERCIAL

## 2022-09-12 DIAGNOSIS — E78.2 MIXED DYSLIPIDEMIA: ICD-10-CM

## 2022-09-12 DIAGNOSIS — E11.65 TYPE 2 DIABETES MELLITUS WITH HYPERGLYCEMIA, WITHOUT LONG-TERM CURRENT USE OF INSULIN (HCC): ICD-10-CM

## 2022-09-12 PROCEDURE — 99999 PR NO CHARGE: CPT | Performed by: FAMILY MEDICINE

## 2022-09-12 RX ORDER — ATORVASTATIN CALCIUM 40 MG/1
40 TABLET, FILM COATED ORAL NIGHTLY
Qty: 90 TABLET | Refills: 1 | Status: SHIPPED | OUTPATIENT
Start: 2022-09-12 | End: 2022-09-12

## 2022-09-12 RX ORDER — ROSUVASTATIN CALCIUM 20 MG/1
20 TABLET, COATED ORAL EVERY EVENING
Qty: 90 TABLET | Refills: 1 | Status: SHIPPED | OUTPATIENT
Start: 2022-09-12 | End: 2023-04-10 | Stop reason: SDUPTHER

## 2022-09-12 NOTE — PROGRESS NOTES
Patient Consult Note  245pm - 257    Primary care physician: NIMA Frankel    Reason for consult: Management of Uncontrolled Type 2 Diabetes    HPI:  Sanna Yuen is a 50 y.o. old patient who comes in today for evaluation of above stated problem.    Most Recent HbA1c:   Lab Results   Component Value Date/Time    HBA1C 6.4 (H) 09/01/2022 06:23 AM       Lab Results   Component Value Date/Time    CHOLSTRLTOT 193 09/01/2022 06:23 AM     (H) 09/01/2022 06:23 AM    HDL 52 09/01/2022 06:23 AM    TRIGLYCERIDE 158 (H) 09/01/2022 06:23 AM       Lab Results   Component Value Date/Time    SODIUM 141 09/01/2022 06:23 AM    POTASSIUM 4.2 09/01/2022 06:23 AM    CHLORIDE 105 09/01/2022 06:23 AM    CO2 23 09/01/2022 06:23 AM    GLUCOSE 126 (H) 09/01/2022 06:23 AM    BUN 25 (H) 09/01/2022 06:23 AM    CREATININE 0.54 09/01/2022 06:23 AM     Lab Results   Component Value Date/Time    ALKPHOSPHAT 40 09/01/2022 06:23 AM    ASTSGOT 16 09/01/2022 06:23 AM    ALTSGPT 13 09/01/2022 06:23 AM    TBILIRUBIN 0.2 09/01/2022 06:23 AM          Diabetes Medication History and Current Regimen  Metformin: 1000mg BID   SGLT-2 Inhibitor:  Jardiance 25mg once daily  trulicity 4.5mg every 7 days    actos 30mg once daily     Pt has home glucometer and proper testing technique - yes    Pt reports blood sugars:       Hypoglycemia awareness - yes  Nocturnal hypoglycemia- denies  Hypoglycemia:  None    Pt's treatment of Hypoglycemia - juice  - 15:15 Rule    Current Exercise - moderate walking- with her current job she walks 5 x per week about 8000 steps. Patient has bad hip, so does not do much outside of working.   Exercise Goal - elevated heartrate reaching a goal of 150 min/wk    Dietary - Common Adult     Pt reports eating: less sweets (has omitted candy, ice cream, cookies etc)   Patient works beginning at 4am so her diet varies. She still eats pizza, pasta, sandwiches, pupusa, soup, chicken    Foot Exam:  Monofilament  exam - annually and also self examines daily    Preventative Management  BP regimen (ACE/ARB) - Losartan 25mg QD  ASA - n/a  Statin - Simvastatin 20mg QD  Last Retinal Scan - annually - due 2022   Last Foot Exam - annually - due 2022  Last A1c -   Lab Results   Component Value Date/Time    HBA1C 6.4 (H) 09/01/2022 06:23 AM      Last Microalbuminuria - ordered     updated caregaps    Past Medical History:  Patient Active Problem List    Diagnosis Date Noted    Osteoarthrosis, hip 05/12/2022    Chronic right shoulder pain 11/01/2021    Osteoarthritis of hip 09/13/2021    Type 2 diabetes mellitus with hyperglycemia, without long-term current use of insulin (HCC) 07/28/2021    Essential hypertension 07/28/2021    Mixed dyslipidemia 07/28/2021    Hot flashes due to menopause 07/28/2021    Right leg injury 07/28/2021    Class 1 obesity due to excess calories with serious comorbidity and body mass index (BMI) of 31.0 to 31.9 in adult 07/28/2021    Cigarette smoker one half pack a day or less 07/28/2021       Past Surgical History:  Past Surgical History:   Procedure Laterality Date    IN TOTAL HIP ARTHROPLASTY Right 5/12/2022    Procedure: RIGHT ANTERIOR TOTAL HIP ARTHROPLASTY;  Surgeon: Frankie Gaytan M.D.;  Location: [facility];  Service: Orthopedics    CHOLECYSTECTOMY      PRIMARY C SECTION      TUBAL COAGULATION LAPAROSCOPIC BILATERAL         Allergies:  Lipitor [atorvastatin], Niacin, and Penicillins    Social History:  Social History     Socioeconomic History    Marital status: Single     Spouse name: Not on file    Number of children: Not on file    Years of education: Not on file    Highest education level: 12th grade   Occupational History    Not on file   Tobacco Use    Smoking status: Every Day     Packs/day: 0.50     Years: 10.00     Pack years: 5.00     Types: Cigarettes    Smokeless tobacco: Never   Vaping Use    Vaping Use: Never used   Substance and Sexual Activity    Alcohol use: Yes      Comment: 3-5 times a year one drink    Drug use: Never    Sexual activity: Yes     Partners: Male     Birth control/protection: Surgical     Comment: one partner    Other Topics Concern    Not on file   Social History Narrative    Not on file     Social Determinants of Health     Financial Resource Strain: High Risk    Difficulty of Paying Living Expenses: Hard   Food Insecurity: Food Insecurity Present    Worried About Running Out of Food in the Last Year: Sometimes true    Ran Out of Food in the Last Year: Patient refused   Transportation Needs: No Transportation Needs    Lack of Transportation (Medical): No    Lack of Transportation (Non-Medical): No   Physical Activity: Unknown    Days of Exercise per Week: 5 days    Minutes of Exercise per Session: Patient refused   Stress: No Stress Concern Present    Feeling of Stress : Not at all   Social Connections: Moderately Isolated    Frequency of Communication with Friends and Family: More than three times a week    Frequency of Social Gatherings with Friends and Family: More than three times a week    Attends [employer] Services: 1 to 4 times per year    Active Member of Clubs or Organizations: No    Attends Club or Organization Meetings: Patient refused    Marital Status:    Intimate Partner Violence: Not on file   Housing Stability: Low Risk     Unable to Pay for Housing in the Last Year: No    Number of Places Lived in the Last Year: 1    Unstable Housing in the Last Year: No       Family History:  Family History   Problem Relation Age of Onset    Diabetes Sister     Hypertension Sister     Thyroid Sister     Cancer Mother         skin cancer    Hypertension Mother     Thyroid Mother     Hypertension Father     Thyroid Father     Hyperlipidemia Father     Glaucoma Maternal Grandmother     Cancer Paternal Grandfather         throat cancer    Diabetes Sister     Diabetes Son        Medications:    Current Outpatient Medications:     metformin (GLUCOPHAGE)  1000 MG tablet, Take 1 Tablet by mouth 2 times a day., Disp: 60 Tablet, Rfl: 2    fenofibrate (TRICOR) 145 MG Tab, Take 1 Tablet by mouth every day., Disp: 90 Tablet, Rfl: 3    atenolol (TENORMIN) 50 MG Tab, Take 1 Tablet by mouth every day., Disp: 90 Tablet, Rfl: 3    amLODIPine (NORVASC) 10 MG Tab, Take 1 Tablet by mouth every day., Disp: 90 Tablet, Rfl: 3    celecoxib (CELEBREX) 100 MG Cap, TAKE 1 CAPSULE BY MOUTH IN THE MORNING AND 1 CAPSULE IN THE EVENING., Disp: 60 Capsule, Rfl: 1    simvastatin (ZOCOR) 20 MG Tab, Take 1 Tablet by mouth every evening., Disp: 90 Tablet, Rfl: 3    TRULICITY 4.5 MG/0.5ML Solution Pen-injector, INJECT THE CONTENTS OF ONE SYRINGE (0.5ML) SUBCUTANEOUSLY EVERY 7 DAYS, Disp: 4 mL, Rfl: 5    JARDIANCE 25 MG Tab, TAKE ONE TABLET BY MOUTH ONE TIME DAILY, Disp: 30 Tablet, Rfl: 3    celecoxib (CELEBREX) 200 MG Cap, Take 1 Capsule by mouth 2 times a day., Disp: 60 Capsule, Rfl: 0    triamcinolone acetonide (KENALOG-40) 40 MG/ML Suspension, 1.6 ml, Disp: , Rfl:     FERROUS SULFATE PO, 65 mg tablet one tablet once daily, Disp: , Rfl:     pioglitazone (ACTOS) 30 MG Tab, TAKE ONE TABLET BY MOUTH ONE TIME DAILY, Disp: 90 Tablet, Rfl: 3    losartan (COZAAR) 25 MG Tab, Take 1 Tablet by mouth every day., Disp: 30 Tablet, Rfl: 11    glucose blood (RELION TRUE METRIX TEST STRIPS) strip, 1 Strip by Other route 2 times a day., Disp: 180 Strip, Rfl: 3    cetirizine (ZYRTEC) 10 MG Tab, Take 10 mg by mouth every day., Disp: , Rfl:     Multiple Vitamins-Minerals (MULTIVITAMIN ADULT PO), Take  by mouth., Disp: , Rfl:     Multiple Vitamins-Minerals (EYE VITAMINS PO), Take  by mouth., Disp: , Rfl:     Labs: Reviewed    Physical Examination:  Vital signs: There were no vitals taken for this visit. There is no height or weight on file to calculate BMI.    Assessment and Plan:    1. DM2  A1c at goal  LDL above goal of 100  She is willing to stop simvastatin and retry atorvastatin or rosuvastatin.  She had a past  history of myalgias with atorvastatin.  Therefore, after reconsideration we will try rosuvastatin.    - Medication changes   Cont. Metformin: 1000mg BID   Thomas. SGLT-2 Inhibitor:  Jardiance 25mg once daily   thomas. trulicity 4.5mg every 7 days   Thomas. Actos 30 mg once daily   Stop simvastatin, start rosuvastatin 20 mg once daily    - Lifestyle changes   - Patient will continue to walk during work and will try to increase movement outside of work as tolerated.   Patient encouraged to continue to make healthier food choices and substitutions when able and to cut down on carbs in general.       Follow up again in 16 weeks.        CC:   NIMA Frankel

## 2022-10-27 DIAGNOSIS — E11.9 TYPE 2 DIABETES MELLITUS WITHOUT COMPLICATION, WITHOUT LONG-TERM CURRENT USE OF INSULIN (HCC): ICD-10-CM

## 2022-10-27 RX ORDER — BLOOD SUGAR DIAGNOSTIC
1 STRIP MISCELLANEOUS 2 TIMES DAILY
Qty: 180 STRIP | Refills: 3 | Status: SHIPPED | OUTPATIENT
Start: 2022-10-27 | End: 2023-08-28 | Stop reason: SDUPTHER

## 2022-10-27 RX ORDER — AMLODIPINE BESYLATE 10 MG/1
10 TABLET ORAL DAILY
Qty: 90 TABLET | Refills: 3 | Status: SHIPPED | OUTPATIENT
Start: 2022-10-27 | End: 2023-08-28 | Stop reason: SDUPTHER

## 2022-10-27 RX ORDER — ATENOLOL 50 MG/1
50 TABLET ORAL DAILY
Qty: 90 TABLET | Refills: 3 | Status: SHIPPED | OUTPATIENT
Start: 2022-10-27 | End: 2023-08-28 | Stop reason: SDUPTHER

## 2022-10-27 RX ORDER — FENOFIBRATE 145 MG/1
145 TABLET, COATED ORAL DAILY
Qty: 90 TABLET | Refills: 3 | Status: SHIPPED | OUTPATIENT
Start: 2022-10-27 | End: 2023-08-28 | Stop reason: SDUPTHER

## 2022-10-27 RX ORDER — EMPAGLIFLOZIN 25 MG/1
1 TABLET, FILM COATED ORAL DAILY
Qty: 30 TABLET | Refills: 3 | OUTPATIENT
Start: 2022-10-27

## 2022-10-27 RX ORDER — PIOGLITAZONEHYDROCHLORIDE 30 MG/1
30 TABLET ORAL DAILY
Qty: 90 TABLET | Refills: 3 | Status: SHIPPED | OUTPATIENT
Start: 2022-10-27 | End: 2023-08-28 | Stop reason: SDUPTHER

## 2022-11-02 DIAGNOSIS — E11.9 TYPE 2 DIABETES MELLITUS WITHOUT COMPLICATION, WITHOUT LONG-TERM CURRENT USE OF INSULIN (HCC): ICD-10-CM

## 2022-11-02 RX ORDER — EMPAGLIFLOZIN 25 MG/1
1 TABLET, FILM COATED ORAL DAILY
Qty: 90 TABLET | Refills: 3 | OUTPATIENT
Start: 2022-11-02

## 2022-11-10 ENCOUNTER — TELEPHONE (OUTPATIENT)
Dept: MEDICAL GROUP | Facility: PHYSICIAN GROUP | Age: 52
End: 2022-11-10
Payer: COMMERCIAL

## 2022-11-10 NOTE — TELEPHONE ENCOUNTER
VOICEMAIL  1. Caller Name: Sanna Yuen                      Call Back Number: [phone]     2. Message: Patient left vm stating pharmacy is not ordering Trulicity anymore? According to - requesting an alternative.     3. Patient approves office to leave a detailed voicemail/MyChart message: N\A

## 2022-11-16 NOTE — TELEPHONE ENCOUNTER
Phone Number Called: [phone]     Call outcome: Spoke to patient regarding message below.    Message: [facility] pharmacy told patient that the  will not send this medication. Patient is limited to pharmacy's due to insurance. Patient needs an alterative to Trulicity.

## 2022-11-30 DIAGNOSIS — E11.65 TYPE 2 DIABETES MELLITUS WITH HYPERGLYCEMIA, WITHOUT LONG-TERM CURRENT USE OF INSULIN (HCC): ICD-10-CM

## 2022-12-08 DIAGNOSIS — E11.65 TYPE 2 DIABETES MELLITUS WITH HYPERGLYCEMIA, WITHOUT LONG-TERM CURRENT USE OF INSULIN (HCC): ICD-10-CM

## 2023-01-12 DIAGNOSIS — E11.65 TYPE 2 DIABETES MELLITUS WITH HYPERGLYCEMIA, WITHOUT LONG-TERM CURRENT USE OF INSULIN (HCC): ICD-10-CM

## 2023-01-12 RX ORDER — DULAGLUTIDE 4.5 MG/.5ML
INJECTION, SOLUTION SUBCUTANEOUS
Qty: 4 ML | Refills: 5 | OUTPATIENT
Start: 2023-01-12

## 2023-01-12 NOTE — TELEPHONE ENCOUNTER
Received request via: Patient    Was the patient seen in the last year in this department? Yes    Does the patient have an active prescription (recently filled or refills available) for medication(s) requested? No    Does the patient have care home Plus and need 100 day supply (blood pressure, diabetes and cholesterol meds only)? Patient does not have SCP      Patient is requesting the 3 level dose as pharmacy is out of level 4

## 2023-01-16 ENCOUNTER — NON-PROVIDER VISIT (OUTPATIENT)
Dept: MEDICAL GROUP | Facility: PHYSICIAN GROUP | Age: 53
End: 2023-01-16
Payer: COMMERCIAL

## 2023-01-16 PROCEDURE — 99401 PREV MED CNSL INDIV APPRX 15: CPT | Performed by: FAMILY MEDICINE

## 2023-01-16 NOTE — PROGRESS NOTES
Patient Consult Note  [id number]    Primary care physician: NIMA Frankel    Reason for consult: Management of Uncontrolled Type 2 Diabetes    HPI:  Sanna Yuen is a 50 y.o. old patient who comes in today for evaluation of above stated problem.    Most Recent HbA1c:   Lab Results   Component Value Date/Time    HBA1C 6.4 (H) 09/01/2022 06:23 AM       Lab Results   Component Value Date/Time    CHOLSTRLTOT 193 09/01/2022 06:23 AM     (H) 09/01/2022 06:23 AM    HDL 52 09/01/2022 06:23 AM    TRIGLYCERIDE 158 (H) 09/01/2022 06:23 AM       Lab Results   Component Value Date/Time    SODIUM 141 09/01/2022 06:23 AM    POTASSIUM 4.2 09/01/2022 06:23 AM    CHLORIDE 105 09/01/2022 06:23 AM    CO2 23 09/01/2022 06:23 AM    GLUCOSE 126 (H) 09/01/2022 06:23 AM    BUN 25 (H) 09/01/2022 06:23 AM    CREATININE 0.54 09/01/2022 06:23 AM     Lab Results   Component Value Date/Time    ALKPHOSPHAT 40 09/01/2022 06:23 AM    ASTSGOT 16 09/01/2022 06:23 AM    ALTSGPT 13 09/01/2022 06:23 AM    TBILIRUBIN 0.2 09/01/2022 06:23 AM          Diabetes Medication History and Current Regimen  Metformin: 1000mg BID   SGLT-2 Inhibitor:  Jardiance 25mg once daily  actos 30mg once daily     Pt has home glucometer and proper testing technique - yes    Pt reports blood sugars:       Hypoglycemia awareness - yes  Nocturnal hypoglycemia- denies  Hypoglycemia:  None    Pt's treatment of Hypoglycemia - juice  - 15:15 Rule    Current Exercise - moderate walking- with her current job she walks 5 x per week about 8000 steps. Patient has bad hip, so does not do much outside of working.   Exercise Goal - elevated heartrate reaching a goal of 150 min/wk    Dietary - Common Adult     Pt reports eating: less sweets (has omitted candy, ice cream, cookies etc)   Patient works beginning at 4am so her diet varies. She still eats pizza, pasta, sandwiches, pupusa, soup, chicken    Foot Exam:  Monofilament exam - annually and also self  examines daily    Preventative Management  BP regimen (ACE/ARB) - Losartan 25mg QD  ASA - n/a  Statin - Simvastatin 20mg QD  Last Retinal Scan - annually - due 2022   Last Foot Exam - annually - due 2022  Last A1c -   Lab Results   Component Value Date/Time    HBA1C 6.4 (H) 09/01/2022 06:23 AM      Last Microalbuminuria - ordered     updated caregaps    Past Medical History:  Patient Active Problem List    Diagnosis Date Noted    Osteoarthrosis, hip 05/12/2022    Chronic right shoulder pain 11/01/2021    Osteoarthritis of hip 09/13/2021    Type 2 diabetes mellitus with hyperglycemia, without long-term current use of insulin (HCC) 07/28/2021    Essential hypertension 07/28/2021    Mixed dyslipidemia 07/28/2021    Hot flashes due to menopause 07/28/2021    Right leg injury 07/28/2021    Class 1 obesity due to excess calories with serious comorbidity and body mass index (BMI) of 31.0 to 31.9 in adult 07/28/2021    Cigarette smoker one half pack a day or less 07/28/2021       Past Surgical History:  Past Surgical History:   Procedure Laterality Date    OH TOTAL HIP ARTHROPLASTY Right 5/12/2022    Procedure: RIGHT ANTERIOR TOTAL HIP ARTHROPLASTY;  Surgeon: Frankie Gaytan M.D.;  Location: [facility];  Service: Orthopedics    CHOLECYSTECTOMY      PRIMARY C SECTION      TUBAL COAGULATION LAPAROSCOPIC BILATERAL         Allergies:  Lipitor [atorvastatin], Niacin, and Penicillins    Social History:  Social History     Socioeconomic History    Marital status: Single     Spouse name: Not on file    Number of children: Not on file    Years of education: Not on file    Highest education level: 12th grade   Occupational History    Not on file   Tobacco Use    Smoking status: Every Day     Packs/day: 0.50     Years: 10.00     Pack years: 5.00     Types: Cigarettes    Smokeless tobacco: Never   Vaping Use    Vaping Use: Never used   Substance and Sexual Activity    Alcohol use: Yes     Comment: 3-5 times a year one  drink    Drug use: Never    Sexual activity: Yes     Partners: Male     Birth control/protection: Surgical     Comment: one partner    Other Topics Concern    Not on file   Social History Narrative    Not on file     Social Determinants of Health     Financial Resource Strain: Not on file   Food Insecurity: Not on file   Transportation Needs: Not on file   Physical Activity: Not on file   Stress: Not on file   Social Connections: Not on file   Intimate Partner Violence: Not on file   Housing Stability: Not on file       Family History:  Family History   Problem Relation Age of Onset    Diabetes Sister     Hypertension Sister     Thyroid Sister     Cancer Mother         skin cancer    Hypertension Mother     Thyroid Mother     Hypertension Father     Thyroid Father     Hyperlipidemia Father     Glaucoma Maternal Grandmother     Cancer Paternal Grandfather         throat cancer    Diabetes Sister     Diabetes Son        Medications:    Current Outpatient Medications:     JARDIANCE 25 MG Tab, TAKE ONE TABLET BY MOUTH ONE TIME DAILY, Disp: 90 Tablet, Rfl: 3    metformin (GLUCOPHAGE) 1000 MG tablet, Take 1 Tablet by mouth 2 times a day., Disp: 180 Tablet, Rfl: 3    pioglitazone (ACTOS) 30 MG Tab, Take 1 Tablet by mouth every day., Disp: 90 Tablet, Rfl: 3    rosuvastatin (CRESTOR) 20 MG Tab, Take 1 Tablet by mouth every evening. DC the atorvastatin Rx, due to past myalgias, Disp: 90 Tablet, Rfl: 1    celecoxib (CELEBREX) 100 MG Cap, TAKE 1 CAPSULE BY MOUTH EVERY MORNING AND 1 EVERY EVENING, Disp: 60 Capsule, Rfl: 0    fenofibrate (TRICOR) 145 MG Tab, Take 1 Tablet by mouth every day., Disp: 90 Tablet, Rfl: 3    amLODIPine (NORVASC) 10 MG Tab, Take 1 Tablet by mouth every day., Disp: 90 Tablet, Rfl: 3    atenolol (TENORMIN) 50 MG Tab, Take 1 Tablet by mouth every day., Disp: 90 Tablet, Rfl: 3    glucose blood (RELION TRUE METRIX TEST STRIPS) strip, 1 Strip by Other route 2 times a day., Disp: 180 Strip, Rfl: 3    celecoxib  (CELEBREX) 200 MG Cap, Take 1 Capsule by mouth 2 times a day., Disp: 60 Capsule, Rfl: 0    triamcinolone acetonide (KENALOG-40) 40 MG/ML Suspension, 1.6 ml, Disp: , Rfl:     FERROUS SULFATE PO, 65 mg tablet one tablet once daily, Disp: , Rfl:     losartan (COZAAR) 25 MG Tab, Take 1 Tablet by mouth every day., Disp: 30 Tablet, Rfl: 11    cetirizine (ZYRTEC) 10 MG Tab, Take 10 mg by mouth every day., Disp: , Rfl:     Multiple Vitamins-Minerals (MULTIVITAMIN ADULT PO), Take  by mouth., Disp: , Rfl:     Multiple Vitamins-Minerals (EYE VITAMINS PO), Take  by mouth., Disp: , Rfl:     Labs: Reviewed    Physical Examination:  Vital signs: There were no vitals taken for this visit. There is no height or weight on file to calculate BMI.    Assessment and Plan:    1. DM2  A1c at goal  LDL above goal of 100  AM glucose 150-180  PM glucose       - Medication changes   Cont. Metformin: 1000mg BID   Thomas. Actos 30 mg once daily   Thomas. Jardiance 25mg   Thomas. rosuvastatin 20 mg once daily    - Lifestyle changes   - Patient will continue to walk during work and will try to increase movement outside of work as tolerated.   Patient encouraged to continue to make healthier food choices and substitutions when able and to cut down on carbs in general.       Follow up again in 6 weeks.        CC:   NIMA Frankel

## 2023-04-05 ENCOUNTER — DOCUMENTATION (OUTPATIENT)
Dept: VASCULAR LAB | Facility: MEDICAL CENTER | Age: 53
End: 2023-04-05
Payer: COMMERCIAL

## 2023-04-05 ENCOUNTER — HOSPITAL ENCOUNTER (OUTPATIENT)
Dept: LAB | Facility: MEDICAL CENTER | Age: 53
End: 2023-04-05
Attending: NURSE PRACTITIONER
Payer: COMMERCIAL

## 2023-04-05 DIAGNOSIS — E11.65 TYPE 2 DIABETES MELLITUS WITH HYPERGLYCEMIA, WITHOUT LONG-TERM CURRENT USE OF INSULIN (HCC): ICD-10-CM

## 2023-04-05 DIAGNOSIS — E78.2 MIXED DYSLIPIDEMIA: ICD-10-CM

## 2023-04-05 LAB
ALBUMIN SERPL BCP-MCNC: 4.6 G/DL (ref 3.2–4.9)
ALBUMIN/GLOB SERPL: 1.5 G/DL
ALP SERPL-CCNC: 50 U/L (ref 30–99)
ALT SERPL-CCNC: 19 U/L (ref 2–50)
ANION GAP SERPL CALC-SCNC: 12 MMOL/L (ref 7–16)
AST SERPL-CCNC: 10 U/L (ref 12–45)
BILIRUB SERPL-MCNC: 0.2 MG/DL (ref 0.1–1.5)
BUN SERPL-MCNC: 22 MG/DL (ref 8–22)
CALCIUM ALBUM COR SERPL-MCNC: 9.6 MG/DL (ref 8.5–10.5)
CALCIUM SERPL-MCNC: 10.1 MG/DL (ref 8.5–10.5)
CHLORIDE SERPL-SCNC: 103 MMOL/L (ref 96–112)
CHOLEST SERPL-MCNC: 151 MG/DL (ref 100–199)
CO2 SERPL-SCNC: 25 MMOL/L (ref 20–33)
CREAT SERPL-MCNC: 0.56 MG/DL (ref 0.5–1.4)
EST. AVERAGE GLUCOSE BLD GHB EST-MCNC: 186 MG/DL
FASTING STATUS PATIENT QL REPORTED: NORMAL
GFR SERPLBLD CREATININE-BSD FMLA CKD-EPI: 109 ML/MIN/1.73 M 2
GLOBULIN SER CALC-MCNC: 3.1 G/DL (ref 1.9–3.5)
GLUCOSE SERPL-MCNC: 187 MG/DL (ref 65–99)
HBA1C MFR BLD: 8.1 % (ref 4–5.6)
HDLC SERPL-MCNC: 52 MG/DL
LDLC SERPL CALC-MCNC: 75 MG/DL
POTASSIUM SERPL-SCNC: 4.8 MMOL/L (ref 3.6–5.5)
PROT SERPL-MCNC: 7.7 G/DL (ref 6–8.2)
SODIUM SERPL-SCNC: 140 MMOL/L (ref 135–145)
TRIGL SERPL-MCNC: 120 MG/DL (ref 0–149)

## 2023-04-05 PROCEDURE — 36415 COLL VENOUS BLD VENIPUNCTURE: CPT

## 2023-04-05 PROCEDURE — 83036 HEMOGLOBIN GLYCOSYLATED A1C: CPT

## 2023-04-05 PROCEDURE — 80053 COMPREHEN METABOLIC PANEL: CPT

## 2023-04-05 PROCEDURE — 80061 LIPID PANEL: CPT

## 2023-04-10 ENCOUNTER — NON-PROVIDER VISIT (OUTPATIENT)
Dept: MEDICAL GROUP | Facility: PHYSICIAN GROUP | Age: 53
End: 2023-04-10
Payer: COMMERCIAL

## 2023-04-10 DIAGNOSIS — E11.65 TYPE 2 DIABETES MELLITUS WITH HYPERGLYCEMIA, WITHOUT LONG-TERM CURRENT USE OF INSULIN (HCC): ICD-10-CM

## 2023-04-10 PROCEDURE — 99401 PREV MED CNSL INDIV APPRX 15: CPT | Performed by: FAMILY MEDICINE

## 2023-04-10 PROCEDURE — 92250 FUNDUS PHOTOGRAPHY W/I&R: CPT | Mod: TC | Performed by: STUDENT IN AN ORGANIZED HEALTH CARE EDUCATION/TRAINING PROGRAM

## 2023-04-10 RX ORDER — ROSUVASTATIN CALCIUM 20 MG/1
20 TABLET, COATED ORAL EVERY EVENING
Qty: 90 TABLET | Refills: 1 | Status: SHIPPED | OUTPATIENT
Start: 2023-04-10 | End: 2023-08-28 | Stop reason: SDUPTHER

## 2023-04-10 RX ORDER — SEMAGLUTIDE 1.34 MG/ML
0.25 INJECTION, SOLUTION SUBCUTANEOUS
Qty: 1.5 ML | Refills: 6 | Status: SHIPPED | OUTPATIENT
Start: 2023-04-10 | End: 2023-05-22 | Stop reason: SDUPTHER

## 2023-04-10 NOTE — PROGRESS NOTES
Patient Consult Note     TIME IN: 330pm   TIME OUT: 352pm       Primary care physician: Lee Ghotra P.A.-C.    Reason for consult: Management of Uncontrolled Type 2 Diabetes    HPI:  Sanna Yuen is a 52 y.o. old patient who comes in today for evaluation of above stated problem.    Allergies:  Lipitor [atorvastatin], Niacin, and Penicillins    Most Recent labs, A1c, and immunizations:    Lab Results   Component Value Date/Time    HBA1C 8.1 (H) 04/05/2023 06:14 AM          Lab Results   Component Value Date/Time    CHOLSTRLTOT 151 04/05/2023 06:14 AM    LDL 75 04/05/2023 06:14 AM    HDL 52 04/05/2023 06:14 AM    TRIGLYCERIDE 120 04/05/2023 06:14 AM       Lab Results   Component Value Date/Time    SODIUM 140 04/05/2023 06:14 AM    POTASSIUM 4.8 04/05/2023 06:14 AM    CHLORIDE 103 04/05/2023 06:14 AM    CO2 25 04/05/2023 06:14 AM    GLUCOSE 187 (H) 04/05/2023 06:14 AM    BUN 22 04/05/2023 06:14 AM    CREATININE 0.56 04/05/2023 06:14 AM     Lab Results   Component Value Date/Time    ALKPHOSPHAT 50 04/05/2023 06:14 AM    ASTSGOT 10 (L) 04/05/2023 06:14 AM    ALTSGPT 19 04/05/2023 06:14 AM    TBILIRUBIN 0.2 04/05/2023 06:14 AM    ALBUMIN 4.6 04/05/2023 06:14 AM      Lab Results   Component Value Date/Time    MALBCRT see below 09/01/2022 06:23 AM    MICROALBUR <1.2 09/01/2022 06:23 AM     Immunization History   Administered Date(s) Administered    Influenza (IM) Preservative Free - HISTORICAL DATA 09/18/2011, 10/06/2014, 10/26/2015    Influenza Seasonal Injectable - Historical Data 12/09/2012, 10/28/2016    Influenza Vac Subunit Quad Inj (Pf) 10/26/2017, 10/09/2020    Influenza Vaccine Pediatric Split - Historical Data 05/22/2007, 03/11/2008, 09/15/2010    Influenza Vaccine Quad Inj (Pf) 09/28/2019, 09/16/2021    Influenza Vaccine Quad Inj (Preserved) 10/17/2018    [facility] SARS-CoV-2 Vaccine 04/28/2021    MODERNA SARS-COV-2 VACCINE (12+) 12/01/2021    Pneumococcal polysaccharide vaccine (PPSV-23) 09/07/2018     TD Vaccine 05/22/2007    Tdap Vaccine 09/07/2018    Zoster Vaccine Recombinant (RZV) (SHINGRIX) 11/01/2021           Physical Examination:  Vital signs: There were no vitals taken for this visit. There is no height or weight on file to calculate BMI.      Medications:    Current Outpatient Medications:     rosuvastatin, 20 mg, Oral, Q EVENING    Ozempic (0.25 or 0.5 MG/DOSE), 0.25 mg, Subcutaneous, Q7 DAYS    losartan, TAKE ONE TABLET BY MOUTH ONE TIME DAILY    celecoxib, TAKE 1 CAPSULE BY MOUTH EVERY MORNING AND 1 EVERY EVENING    Jardiance, TAKE ONE TABLET BY MOUTH ONE TIME DAILY    fenofibrate, 145 mg, Oral, DAILY    amLODIPine, 10 mg, Oral, DAILY    atenolol, 50 mg, Oral, DAILY    metformin, 1,000 mg, Oral, BID    pioglitazone, 30 mg, Oral, DAILY    ReliOn True Metrix Test Strips, 1 Each, Other, BID    celecoxib, 200 mg, Oral, BID    triamcinolone acetonide, 1.6 ml    FERROUS SULFATE PO, 65 mg tablet one tablet once daily    cetirizine, 10 mg, Oral, DAILY    Multiple Vitamins-Minerals (MULTIVITAMIN ADULT PO), Take  by mouth.    Multiple Vitamins-Minerals (EYE VITAMINS PO), Take  by mouth.      Assessment and Plan:  1. DM2   Most recent A1c is: >8  Retinal exam completed in clinic today  Kidney function:  >60 ml/min creatinine clearance    Medication(s) recommended:   Cont. Metformin: 1000mg BID   Thomas. Actos 30 mg once daily   Thomas. Jardiance 25mg   Start Ozempic 0.25mg every 7 days     -Blood glucose and A1c target    However, more aggressive diabetic control is reasonable when tolerated.  Pt's treatment of Hypoglycemia. 15:15 Rule discussed with patient      2.  Cardiovascular  Is LDL <100: y  Is Blood pressure <130/80:  y  Medication(s) recommended.   Thomas Crestor 20mg daily       3.  Lifestyle changes   Focus on eating a DASH/Mediterranean-style diet.   Exercise 30 minutes daily at least 5 days/week, as tolerated.  https://www.niddk.nih.gov/bwp      Follow-up appointment in 6 week(s)    Matthew OVERTON  Ludy Elias, MS, BCACP, [facility]  Phone [phone] fax [phone]    This note was created using voice recognition software (Dragon). The accuracy of the dictation is limited by the abilities of the software. I have reviewed the note prior to signing, however some errors in grammar and context are still possible. If you have any questions related to this note please do not hesitate to contact our office.     CC:   Lee Ghotra [email or web address].  No ref. provider found  Dr. Lee Sultana

## 2023-04-11 LAB — RETINAL SCREEN: NEGATIVE

## 2023-05-22 ENCOUNTER — NON-PROVIDER VISIT (OUTPATIENT)
Dept: MEDICAL GROUP | Facility: PHYSICIAN GROUP | Age: 53
End: 2023-05-22
Payer: COMMERCIAL

## 2023-05-22 VITALS
HEIGHT: 62 IN | HEART RATE: 91 BPM | WEIGHT: 171 LBS | DIASTOLIC BLOOD PRESSURE: 81 MMHG | BODY MASS INDEX: 31.47 KG/M2 | SYSTOLIC BLOOD PRESSURE: 127 MMHG

## 2023-05-22 DIAGNOSIS — E11.65 TYPE 2 DIABETES MELLITUS WITH HYPERGLYCEMIA, WITHOUT LONG-TERM CURRENT USE OF INSULIN (HCC): ICD-10-CM

## 2023-05-22 PROCEDURE — 99999 PR NO CHARGE: CPT | Performed by: STUDENT IN AN ORGANIZED HEALTH CARE EDUCATION/TRAINING PROGRAM

## 2023-05-22 PROCEDURE — 3079F DIAST BP 80-89 MM HG: CPT | Performed by: STUDENT IN AN ORGANIZED HEALTH CARE EDUCATION/TRAINING PROGRAM

## 2023-05-22 PROCEDURE — 3074F SYST BP LT 130 MM HG: CPT | Performed by: STUDENT IN AN ORGANIZED HEALTH CARE EDUCATION/TRAINING PROGRAM

## 2023-05-22 RX ORDER — SEMAGLUTIDE 1.34 MG/ML
0.25 INJECTION, SOLUTION SUBCUTANEOUS
Qty: 1.5 ML | Refills: 6 | Status: SHIPPED | OUTPATIENT
Start: 2023-05-22 | End: 2023-06-29 | Stop reason: SDUPTHER

## 2023-05-22 ASSESSMENT — FIBROSIS 4 INDEX: FIB4 SCORE: 0.66

## 2023-05-22 NOTE — PROGRESS NOTES
Patient Consult Note     TIME IN: 145   TIME OUT:159      Primary care physician: Lee Ghotra P.A.-C.    Reason for consult: Management of Uncontrolled Type 2 Diabetes    HPI:  Sanna Yuen is a 52 y.o. old patient who comes in today for evaluation of above stated problem.    Allergies:  Lipitor [atorvastatin], Niacin, and Penicillins    Most Recent labs, A1c, and immunizations:    Lab Results   Component Value Date/Time    HBA1C 8.1 (H) 04/05/2023 06:14 AM          Lab Results   Component Value Date/Time    CHOLSTRLTOT 151 04/05/2023 06:14 AM    LDL 75 04/05/2023 06:14 AM    HDL 52 04/05/2023 06:14 AM    TRIGLYCERIDE 120 04/05/2023 06:14 AM       Lab Results   Component Value Date/Time    SODIUM 140 04/05/2023 06:14 AM    POTASSIUM 4.8 04/05/2023 06:14 AM    CHLORIDE 103 04/05/2023 06:14 AM    CO2 25 04/05/2023 06:14 AM    GLUCOSE 187 (H) 04/05/2023 06:14 AM    BUN 22 04/05/2023 06:14 AM    CREATININE 0.56 04/05/2023 06:14 AM     Lab Results   Component Value Date/Time    ALKPHOSPHAT 50 04/05/2023 06:14 AM    ASTSGOT 10 (L) 04/05/2023 06:14 AM    ALTSGPT 19 04/05/2023 06:14 AM    TBILIRUBIN 0.2 04/05/2023 06:14 AM    ALBUMIN 4.6 04/05/2023 06:14 AM      Lab Results   Component Value Date/Time    MALBCRT see below 09/01/2022 06:23 AM    MICROALBUR <1.2 09/01/2022 06:23 AM     Immunization History   Administered Date(s) Administered    Influenza (IM) Preservative Free - HISTORICAL DATA 09/18/2011, 10/06/2014, 10/26/2015    Influenza Seasonal Injectable - Historical Data 12/09/2012, 10/28/2016    Influenza Vac Subunit Quad Inj (Pf) 10/26/2017, 10/09/2020    Influenza Vaccine Pediatric Split - Historical Data 05/22/2007, 03/11/2008, 09/15/2010    Influenza Vaccine Quad Inj (Pf) 09/28/2019, 09/16/2021    Influenza Vaccine Quad Inj (Preserved) 10/17/2018    [facility] SARS-CoV-2 Vaccine 04/28/2021    MODERNA SARS-COV-2 VACCINE (12+) 12/01/2021    Pneumococcal polysaccharide vaccine (PPSV-23) 09/07/2018    TD  "Vaccine 05/22/2007    Tdap Vaccine 09/07/2018    Zoster Vaccine Recombinant (RZV) (SHINGRIX) 11/01/2021           Physical Examination:  Vital signs: /81   Pulse 91   Ht 1.575 m (5' 2\")   Wt 77.6 kg (171 lb)   BMI 31.28 kg/m²  Body mass index is 31.28 kg/m².      Medications:    Current Outpatient Medications:     Ozempic (0.25 or 0.5 MG/DOSE), 0.25 mg, Subcutaneous, Q7 DAYS    rosuvastatin, 20 mg, Oral, Q EVENING    losartan, TAKE ONE TABLET BY MOUTH ONE TIME DAILY    celecoxib, TAKE 1 CAPSULE BY MOUTH EVERY MORNING AND 1 EVERY EVENING    Jardiance, TAKE ONE TABLET BY MOUTH ONE TIME DAILY    fenofibrate, 145 mg, Oral, DAILY    amLODIPine, 10 mg, Oral, DAILY    atenolol, 50 mg, Oral, DAILY    metformin, 1,000 mg, Oral, BID    pioglitazone, 30 mg, Oral, DAILY    ReliOn True Metrix Test Strips, 1 Each, Other, BID    celecoxib, 200 mg, Oral, BID    triamcinolone acetonide, 1.6 ml    FERROUS SULFATE PO, 65 mg tablet one tablet once daily    cetirizine, 10 mg, Oral, DAILY    Multiple Vitamins-Minerals (MULTIVITAMIN ADULT PO), Take  by mouth.    Multiple Vitamins-Minerals (EYE VITAMINS PO), Take  by mouth.      Assessment and Plan:  1. DM2   Most recent A1c is: >8  Retinal exam completed in clinic today  Kidney function:  >60 ml/min creatinine clearance    Medication(s) recommended:   Cont. Metformin: 1000mg BID   Thomas. Actos 30 mg once daily   Thomas. Jardiance 25mg   Start Ozempic 0.25mg every 7 days     -Blood glucose and A1c target    However, more aggressive diabetic control is reasonable when tolerated.  Pt's treatment of Hypoglycemia. 15:15 Rule discussed with patient      2.  Cardiovascular  Is LDL <100: y  Is Blood pressure <130/80:  y  Medication(s) recommended.   Thomas Crestor 20mg daily   Cont tricor 145mg  Thomas losartan 25mg       3.  Lifestyle changes   Focus on eating a DASH/Mediterranean-style diet.   Exercise 30 minutes daily at least 5 days/week, as " tolerated.  https://www.niddk.nih.gov/bwp      Follow-up appointment in 6 week(s)    Matthew Elias, PharmD, MS, BCACP, [facility]  Phone [phone] fax [phone]    This note was created using voice recognition software (Dragon). The accuracy of the dictation is limited by the abilities of the software. I have reviewed the note prior to signing, however some errors in grammar and context are still possible. If you have any questions related to this note please do not hesitate to contact our office.     CC:   Lee Ghotra, [email or web address].  No ref. provider found  Dr. Lee Sultana

## 2023-07-25 ENCOUNTER — HOSPITAL ENCOUNTER (OUTPATIENT)
Dept: LAB | Facility: MEDICAL CENTER | Age: 53
End: 2023-07-25
Attending: STUDENT IN AN ORGANIZED HEALTH CARE EDUCATION/TRAINING PROGRAM
Payer: COMMERCIAL

## 2023-07-25 DIAGNOSIS — E11.65 TYPE 2 DIABETES MELLITUS WITH HYPERGLYCEMIA, WITHOUT LONG-TERM CURRENT USE OF INSULIN (HCC): ICD-10-CM

## 2023-07-25 LAB
ALBUMIN SERPL BCP-MCNC: 4.6 G/DL (ref 3.2–4.9)
ALBUMIN/GLOB SERPL: 1.8 G/DL
ALP SERPL-CCNC: 38 U/L (ref 30–99)
ALT SERPL-CCNC: 15 U/L (ref 2–50)
ANION GAP SERPL CALC-SCNC: 12 MMOL/L (ref 7–16)
AST SERPL-CCNC: 14 U/L (ref 12–45)
BILIRUB SERPL-MCNC: 0.2 MG/DL (ref 0.1–1.5)
BUN SERPL-MCNC: 25 MG/DL (ref 8–22)
CALCIUM ALBUM COR SERPL-MCNC: 9.3 MG/DL (ref 8.5–10.5)
CALCIUM SERPL-MCNC: 9.8 MG/DL (ref 8.5–10.5)
CHLORIDE SERPL-SCNC: 103 MMOL/L (ref 96–112)
CO2 SERPL-SCNC: 23 MMOL/L (ref 20–33)
CREAT SERPL-MCNC: 0.53 MG/DL (ref 0.5–1.4)
EST. AVERAGE GLUCOSE BLD GHB EST-MCNC: 166 MG/DL
GFR SERPLBLD CREATININE-BSD FMLA CKD-EPI: 110 ML/MIN/1.73 M 2
GLOBULIN SER CALC-MCNC: 2.6 G/DL (ref 1.9–3.5)
GLUCOSE SERPL-MCNC: 132 MG/DL (ref 65–99)
HBA1C MFR BLD: 7.4 % (ref 4–5.6)
POTASSIUM SERPL-SCNC: 4.4 MMOL/L (ref 3.6–5.5)
PROT SERPL-MCNC: 7.2 G/DL (ref 6–8.2)
SODIUM SERPL-SCNC: 138 MMOL/L (ref 135–145)

## 2023-07-25 PROCEDURE — 80053 COMPREHEN METABOLIC PANEL: CPT

## 2023-07-25 PROCEDURE — 83036 HEMOGLOBIN GLYCOSYLATED A1C: CPT

## 2023-07-25 PROCEDURE — 36415 COLL VENOUS BLD VENIPUNCTURE: CPT

## 2023-08-28 ENCOUNTER — DOCUMENTATION (OUTPATIENT)
Dept: HEALTH INFORMATION MANAGEMENT | Facility: OTHER | Age: 53
End: 2023-08-28

## 2023-08-28 ENCOUNTER — NON-PROVIDER VISIT (OUTPATIENT)
Dept: MEDICAL GROUP | Facility: PHYSICIAN GROUP | Age: 53
End: 2023-08-28
Payer: COMMERCIAL

## 2023-08-28 VITALS
WEIGHT: 171 LBS | BODY MASS INDEX: 31.28 KG/M2 | DIASTOLIC BLOOD PRESSURE: 70 MMHG | HEART RATE: 81 BPM | SYSTOLIC BLOOD PRESSURE: 118 MMHG

## 2023-08-28 DIAGNOSIS — E11.9 TYPE 2 DIABETES MELLITUS WITHOUT COMPLICATION, WITHOUT LONG-TERM CURRENT USE OF INSULIN (HCC): ICD-10-CM

## 2023-08-28 PROCEDURE — 99401 PREV MED CNSL INDIV APPRX 15: CPT | Performed by: FAMILY MEDICINE

## 2023-08-28 RX ORDER — ROSUVASTATIN CALCIUM 20 MG/1
20 TABLET, COATED ORAL EVERY EVENING
Qty: 90 TABLET | Refills: 1 | Status: SHIPPED | OUTPATIENT
Start: 2023-08-28 | End: 2024-02-22 | Stop reason: SDUPTHER

## 2023-08-28 RX ORDER — AMLODIPINE BESYLATE 10 MG/1
10 TABLET ORAL DAILY
Qty: 90 TABLET | Refills: 1 | Status: SHIPPED | OUTPATIENT
Start: 2023-08-28

## 2023-08-28 RX ORDER — ATENOLOL 50 MG/1
50 TABLET ORAL DAILY
Qty: 90 TABLET | Refills: 1 | Status: SHIPPED | OUTPATIENT
Start: 2023-08-28

## 2023-08-28 RX ORDER — EMPAGLIFLOZIN 25 MG/1
1 TABLET, FILM COATED ORAL DAILY
Qty: 90 TABLET | Refills: 3 | Status: SHIPPED | OUTPATIENT
Start: 2023-08-28 | End: 2023-08-28 | Stop reason: SDUPTHER

## 2023-08-28 RX ORDER — BLOOD SUGAR DIAGNOSTIC
1 STRIP MISCELLANEOUS 2 TIMES DAILY
Qty: 180 STRIP | Refills: 1 | Status: SHIPPED | OUTPATIENT
Start: 2023-08-28

## 2023-08-28 RX ORDER — PIOGLITAZONEHYDROCHLORIDE 30 MG/1
30 TABLET ORAL DAILY
Qty: 90 TABLET | Refills: 1 | Status: SHIPPED | OUTPATIENT
Start: 2023-08-28

## 2023-08-28 RX ORDER — SEMAGLUTIDE 0.68 MG/ML
0.5 INJECTION, SOLUTION SUBCUTANEOUS
Qty: 3 ML | Refills: 11 | Status: SHIPPED | OUTPATIENT
Start: 2023-08-28

## 2023-08-28 RX ORDER — EMPAGLIFLOZIN 25 MG/1
1 TABLET, FILM COATED ORAL DAILY
Qty: 90 TABLET | Refills: 1 | Status: SHIPPED | OUTPATIENT
Start: 2023-08-28 | End: 2023-08-28 | Stop reason: ALTCHOICE

## 2023-08-28 RX ORDER — FENOFIBRATE 145 MG/1
145 TABLET, COATED ORAL DAILY
Qty: 90 TABLET | Refills: 1 | Status: SHIPPED | OUTPATIENT
Start: 2023-08-28

## 2023-08-28 RX ORDER — EMPAGLIFLOZIN 25 MG/1
25 TABLET, FILM COATED ORAL DAILY
Qty: 90 TABLET | Refills: 1 | Status: SHIPPED | OUTPATIENT
Start: 2023-08-28

## 2023-08-28 RX ORDER — LOSARTAN POTASSIUM 25 MG/1
25 TABLET ORAL DAILY
Qty: 90 TABLET | Refills: 1 | Status: SHIPPED | OUTPATIENT
Start: 2023-08-28

## 2023-08-28 NOTE — PROGRESS NOTES
Patient Consult Note     TIME IN: 145pm   TIME OUT: 206 pm       Primary care physician: Lee Ghotra P.A.-C.    Reason for consult: Management of Uncontrolled Type 2 Diabetes    HPI:  Sanna Yuen is a 52 y.o. old patient who comes in today for evaluation of above stated problem.    Allergies:  Lipitor [atorvastatin], Niacin, and Penicillins    Most Recent labs, A1c, and immunizations:    Lab Results   Component Value Date/Time    HBA1C 7.4 (H) 07/25/2023 06:13 AM          Lab Results   Component Value Date/Time    CHOLSTRLTOT 151 04/05/2023 06:14 AM    LDL 75 04/05/2023 06:14 AM    HDL 52 04/05/2023 06:14 AM    TRIGLYCERIDE 120 04/05/2023 06:14 AM       Lab Results   Component Value Date/Time    SODIUM 138 07/25/2023 06:13 AM    POTASSIUM 4.4 07/25/2023 06:13 AM    CHLORIDE 103 07/25/2023 06:13 AM    CO2 23 07/25/2023 06:13 AM    GLUCOSE 132 (H) 07/25/2023 06:13 AM    BUN 25 (H) 07/25/2023 06:13 AM    CREATININE 0.53 07/25/2023 06:13 AM     Lab Results   Component Value Date/Time    ALKPHOSPHAT 38 07/25/2023 06:13 AM    ASTSGOT 14 07/25/2023 06:13 AM    ALTSGPT 15 07/25/2023 06:13 AM    TBILIRUBIN 0.2 07/25/2023 06:13 AM    ALBUMIN 4.6 07/25/2023 06:13 AM      Lab Results   Component Value Date/Time    MALBCRT see below 09/01/2022 06:23 AM    MICROALBUR <1.2 09/01/2022 06:23 AM     Immunization History   Administered Date(s) Administered    Influenza (IM) Preservative Free - HISTORICAL DATA 09/18/2011, 10/06/2014, 10/26/2015    Influenza Seasonal Injectable - Historical Data 12/09/2012, 10/28/2016    Influenza Vac Subunit Quad Inj (Pf) 10/26/2017, 10/09/2020    Influenza Vaccine Pediatric Split - Historical Data 05/22/2007, 03/11/2008, 09/15/2010    Influenza Vaccine Quad Inj (Pf) 09/28/2019, 09/16/2021    Influenza Vaccine Quad Inj (Preserved) 10/17/2018    [facility] SARS-CoV-2 Vaccine 04/28/2021    MODERNA SARS-COV-2 VACCINE (12+) 12/01/2021    Pneumococcal polysaccharide vaccine (PPSV-23)  09/07/2018    TD Vaccine 05/22/2007    Tdap Vaccine 09/07/2018    Zoster Vaccine Recombinant (RZV) (SHINGRIX) 11/01/2021           Physical Examination:  Vital signs: There were no vitals taken for this visit. There is no height or weight on file to calculate BMI.      Medications:    Current Outpatient Medications:     Ozempic (0.25 or 0.5 MG/DOSE), 0.25 mg, Subcutaneous, Q7 DAYS    rosuvastatin, 20 mg, Oral, Q EVENING    losartan, TAKE ONE TABLET BY MOUTH ONE TIME DAILY    celecoxib, TAKE 1 CAPSULE BY MOUTH EVERY MORNING AND 1 EVERY EVENING    Jardiance, TAKE ONE TABLET BY MOUTH ONE TIME DAILY    fenofibrate, 145 mg, Oral, DAILY    amLODIPine, 10 mg, Oral, DAILY    atenolol, 50 mg, Oral, DAILY    metformin, 1,000 mg, Oral, BID    pioglitazone, 30 mg, Oral, DAILY    ReliOn True Metrix Test Strips, 1 Each, Other, BID    celecoxib, 200 mg, Oral, BID    triamcinolone acetonide, 1.6 ml    FERROUS SULFATE PO, 65 mg tablet one tablet once daily    cetirizine, 10 mg, Oral, DAILY    Multiple Vitamins-Minerals (MULTIVITAMIN ADULT PO), Take  by mouth.    Multiple Vitamins-Minerals (EYE VITAMINS PO), Take  by mouth.      Assessment and Plan:  1. DM2   Most recent A1c is: <8  Retinal exam completed in clinic today  Still smoking, talked about it for <5 min   Kidney function:  >60 ml/min creatinine clearance    Medication(s) recommended:   Cont. Metformin: 1000mg BID   Thomas. Actos 30 mg once daily   Thomas. Jardiance 25mg   Start Ozempic 0.5mg every 7 days     -Blood glucose and A1c target    However, more aggressive diabetic control is reasonable when tolerated.  Pt's treatment of Hypoglycemia. 15:15 Rule discussed with patient      2.  Cardiovascular  Is LDL <100: y  Is Blood pressure <130/80:  y  Medication(s) recommended.   Thomas Crestor 20mg daily   Cont tricor 145mg  Thomas losartan 25mg       3.  Lifestyle changes   Focus on eating a DASH/Mediterranean-style diet.   Exercise 30 minutes daily at least 5 days/week, as  tolerated.  https://www.niddk.nih.gov/bwp      Follow-up appointment in 12 week(s)    Matthew Elias, PharmD, MS, BCACP, [facility]  Phone [phone] fax [phone]    This note was created using voice recognition software (Dragon). The accuracy of the dictation is limited by the abilities of the software. I have reviewed the note prior to signing, however some errors in grammar and context are still possible. If you have any questions related to this note please do not hesitate to contact our office.     CC:   Lee Ghotra, [email or web address].  No ref. provider found  Dr. Lee Sultana

## 2023-09-26 SDOH — HEALTH STABILITY: MENTAL HEALTH
STRESS IS WHEN SOMEONE FEELS TENSE, NERVOUS, ANXIOUS, OR CAN'T SLEEP AT NIGHT BECAUSE THEIR MIND IS TROUBLED. HOW STRESSED ARE YOU?: ONLY A LITTLE

## 2023-09-26 SDOH — ECONOMIC STABILITY: FOOD INSECURITY: WITHIN THE PAST 12 MONTHS, YOU WORRIED THAT YOUR FOOD WOULD RUN OUT BEFORE YOU GOT MONEY TO BUY MORE.: SOMETIMES TRUE

## 2023-09-26 SDOH — HEALTH STABILITY: PHYSICAL HEALTH: ON AVERAGE, HOW MANY DAYS PER WEEK DO YOU ENGAGE IN MODERATE TO STRENUOUS EXERCISE (LIKE A BRISK WALK)?: 5 DAYS

## 2023-09-26 SDOH — ECONOMIC STABILITY: INCOME INSECURITY: HOW HARD IS IT FOR YOU TO PAY FOR THE VERY BASICS LIKE FOOD, HOUSING, MEDICAL CARE, AND HEATING?: HARD

## 2023-09-26 SDOH — ECONOMIC STABILITY: INCOME INSECURITY: IN THE LAST 12 MONTHS, WAS THERE A TIME WHEN YOU WERE NOT ABLE TO PAY THE MORTGAGE OR RENT ON TIME?: NO

## 2023-09-26 SDOH — HEALTH STABILITY: PHYSICAL HEALTH: ON AVERAGE, HOW MANY MINUTES DO YOU ENGAGE IN EXERCISE AT THIS LEVEL?: PATIENT DECLINED

## 2023-09-26 SDOH — ECONOMIC STABILITY: FOOD INSECURITY: WITHIN THE PAST 12 MONTHS, THE FOOD YOU BOUGHT JUST DIDN'T LAST AND YOU DIDN'T HAVE MONEY TO GET MORE.: SOMETIMES TRUE

## 2023-09-26 SDOH — ECONOMIC STABILITY: HOUSING INSECURITY: IN THE LAST 12 MONTHS, HOW MANY PLACES HAVE YOU LIVED?: 1

## 2023-09-26 ASSESSMENT — SOCIAL DETERMINANTS OF HEALTH (SDOH)
HOW OFTEN DO YOU GET TOGETHER WITH FRIENDS OR RELATIVES?: PATIENT DECLINED
WITHIN THE PAST 12 MONTHS, YOU WORRIED THAT YOUR FOOD WOULD RUN OUT BEFORE YOU GOT THE MONEY TO BUY MORE: SOMETIMES TRUE
HOW OFTEN DO YOU ATTENT MEETINGS OF THE CLUB OR ORGANIZATION YOU BELONG TO?: PATIENT DECLINED
HOW HARD IS IT FOR YOU TO PAY FOR THE VERY BASICS LIKE FOOD, HOUSING, MEDICAL CARE, AND HEATING?: HARD
HOW OFTEN DO YOU ATTEND CHURCH OR RELIGIOUS SERVICES?: PATIENT DECLINED
DO YOU BELONG TO ANY CLUBS OR ORGANIZATIONS SUCH AS CHURCH GROUPS UNIONS, FRATERNAL OR ATHLETIC GROUPS, OR SCHOOL GROUPS?: NO
IN A TYPICAL WEEK, HOW MANY TIMES DO YOU TALK ON THE PHONE WITH FAMILY, FRIENDS, OR NEIGHBORS?: MORE THAN THREE TIMES A WEEK
HOW OFTEN DO YOU HAVE A DRINK CONTAINING ALCOHOL: MONTHLY OR LESS
IN A TYPICAL WEEK, HOW MANY TIMES DO YOU TALK ON THE PHONE WITH FAMILY, FRIENDS, OR NEIGHBORS?: MORE THAN THREE TIMES A WEEK
HOW MANY DRINKS CONTAINING ALCOHOL DO YOU HAVE ON A TYPICAL DAY WHEN YOU ARE DRINKING: 1 OR 2
HOW OFTEN DO YOU ATTEND CHURCH OR RELIGIOUS SERVICES?: PATIENT DECLINED
HOW OFTEN DO YOU HAVE SIX OR MORE DRINKS ON ONE OCCASION: NEVER
HOW OFTEN DO YOU GET TOGETHER WITH FRIENDS OR RELATIVES?: PATIENT DECLINED
DO YOU BELONG TO ANY CLUBS OR ORGANIZATIONS SUCH AS CHURCH GROUPS UNIONS, FRATERNAL OR ATHLETIC GROUPS, OR SCHOOL GROUPS?: NO
HOW OFTEN DO YOU ATTENT MEETINGS OF THE CLUB OR ORGANIZATION YOU BELONG TO?: PATIENT DECLINED

## 2023-09-26 ASSESSMENT — LIFESTYLE VARIABLES
HOW OFTEN DO YOU HAVE A DRINK CONTAINING ALCOHOL: MONTHLY OR LESS
HOW MANY STANDARD DRINKS CONTAINING ALCOHOL DO YOU HAVE ON A TYPICAL DAY: 1 OR 2
HOW OFTEN DO YOU HAVE SIX OR MORE DRINKS ON ONE OCCASION: NEVER
AUDIT-C TOTAL SCORE: 1
SKIP TO QUESTIONS 9-10: 1

## 2023-09-27 ENCOUNTER — OFFICE VISIT (OUTPATIENT)
Dept: MEDICAL GROUP | Facility: PHYSICIAN GROUP | Age: 53
End: 2023-09-27
Payer: COMMERCIAL

## 2023-09-27 VITALS
TEMPERATURE: 98.5 F | OXYGEN SATURATION: 95 % | HEART RATE: 91 BPM | WEIGHT: 182.2 LBS | DIASTOLIC BLOOD PRESSURE: 76 MMHG | BODY MASS INDEX: 33.53 KG/M2 | HEIGHT: 62 IN | SYSTOLIC BLOOD PRESSURE: 132 MMHG

## 2023-09-27 DIAGNOSIS — Z23 NEED FOR VACCINATION: ICD-10-CM

## 2023-09-27 DIAGNOSIS — Z12.12 ENCOUNTER FOR SCREENING FOR COLORECTAL MALIGNANT NEOPLASM: ICD-10-CM

## 2023-09-27 DIAGNOSIS — Z12.11 ENCOUNTER FOR SCREENING FOR COLORECTAL MALIGNANT NEOPLASM: ICD-10-CM

## 2023-09-27 DIAGNOSIS — L03.032 ONYCHIA OF TOE, LEFT: ICD-10-CM

## 2023-09-27 DIAGNOSIS — M25.561 ACUTE PAIN OF RIGHT KNEE: ICD-10-CM

## 2023-09-27 DIAGNOSIS — E78.2 MIXED DYSLIPIDEMIA: ICD-10-CM

## 2023-09-27 DIAGNOSIS — M25.531 RIGHT WRIST PAIN: ICD-10-CM

## 2023-09-27 DIAGNOSIS — E11.65 TYPE 2 DIABETES MELLITUS WITH HYPERGLYCEMIA, WITHOUT LONG-TERM CURRENT USE OF INSULIN (HCC): Chronic | ICD-10-CM

## 2023-09-27 DIAGNOSIS — I10 ESSENTIAL HYPERTENSION: Chronic | ICD-10-CM

## 2023-09-27 PROCEDURE — 3078F DIAST BP <80 MM HG: CPT | Performed by: STUDENT IN AN ORGANIZED HEALTH CARE EDUCATION/TRAINING PROGRAM

## 2023-09-27 PROCEDURE — 90746 HEPB VACCINE 3 DOSE ADULT IM: CPT | Performed by: STUDENT IN AN ORGANIZED HEALTH CARE EDUCATION/TRAINING PROGRAM

## 2023-09-27 PROCEDURE — 99214 OFFICE O/P EST MOD 30 MIN: CPT | Mod: 25 | Performed by: STUDENT IN AN ORGANIZED HEALTH CARE EDUCATION/TRAINING PROGRAM

## 2023-09-27 PROCEDURE — 90686 IIV4 VACC NO PRSV 0.5 ML IM: CPT | Performed by: STUDENT IN AN ORGANIZED HEALTH CARE EDUCATION/TRAINING PROGRAM

## 2023-09-27 PROCEDURE — 90471 IMMUNIZATION ADMIN: CPT | Performed by: STUDENT IN AN ORGANIZED HEALTH CARE EDUCATION/TRAINING PROGRAM

## 2023-09-27 PROCEDURE — 90472 IMMUNIZATION ADMIN EACH ADD: CPT | Performed by: STUDENT IN AN ORGANIZED HEALTH CARE EDUCATION/TRAINING PROGRAM

## 2023-09-27 PROCEDURE — 3075F SYST BP GE 130 - 139MM HG: CPT | Performed by: STUDENT IN AN ORGANIZED HEALTH CARE EDUCATION/TRAINING PROGRAM

## 2023-09-27 RX ORDER — CELECOXIB 200 MG/1
200 CAPSULE ORAL 2 TIMES DAILY PRN
Qty: 60 CAPSULE | Refills: 0 | Status: SHIPPED | OUTPATIENT
Start: 2023-09-27 | End: 2023-10-26

## 2023-09-27 ASSESSMENT — PATIENT HEALTH QUESTIONNAIRE - PHQ9: CLINICAL INTERPRETATION OF PHQ2 SCORE: 0

## 2023-09-28 ASSESSMENT — ENCOUNTER SYMPTOMS
DIARRHEA: 0
CHILLS: 0
BLURRED VISION: 0
NAUSEA: 0
PALPITATIONS: 0
SHORTNESS OF BREATH: 0
ABDOMINAL PAIN: 0
HEADACHES: 0
FEVER: 0
VOMITING: 0
DIZZINESS: 0

## 2023-09-28 NOTE — PROGRESS NOTES
Subjective:     CC:    Chief Complaint   Patient presents with    Establish Care    Bump     Possible bone spurt right hand        HISTORY OF THE PRESENT ILLNESS: Patient is a 52 y.o. female. This pleasant patient is here today to establish care and discuss the following concerns. Prior PCP was LUIS Pereyra.    Problem   Right Wrist Pain    Chronic, intermittent right wrist pain.  Worse in the past few months.  Patient has noticed a bump on the top of her wrist which is painful.       Acute Pain of Right Knee    Onset about 3 months ago.  Denies any inciting injury.  Bothers her walking up and down stairs.  Denies any instability, clicking, popping, or locking.     Onychia of Toe, Left    Affects a small distal portion of the second toe on the left foot.     Type 2 Diabetes Mellitus With Hyperglycemia, Without Long-Term Current Use of Insulin (Hcc)    This is a chronic condition.  Current medications:  -Metformin 1000 mg daily  -Ozempic 0.5 mg weekly  -Jardiance 25 mg daily  Medications are managed by clinical pharmacist.  Last A1c: 7.4%, 7/25/2023.  Improved from 8.0%, 4/5/2023.  Last Microalb/Cr ratio: WNL, 9/1/2022.    Last diabetic foot exam: 11/1/21  Last retinal eye exam: 4/10/23       Essential Hypertension    This is a chronic condition.  Current Meds:   -Amlodipine 10 mg daily  -Atenolol 50 mg daily  -Losartan 25 mg daily  Side effects: None  Associated symptoms: Denies chest pain, shortness of breath, headaches, dizziness/lightheadedness        Mixed Dyslipidemia    Lab Results   Component Value Date/Time    CHOLSTRLTOT 151 04/05/2023 0614    TRIGLYCERIDE 120 04/05/2023 0614    HDL 52 04/05/2023 0614    LDL 75 04/05/2023 0614     Rosuvastatin 20 mg daily  Tricor 145 mg daily           Past Medical History: Diagnoses of Essential hypertension, Type 2 diabetes mellitus with hyperglycemia, without long-term current use of insulin (HCC), Right wrist pain, Acute pain of right knee, Mixed dyslipidemia,  Onychia of toe, left, Encounter for screening for colorectal malignant neoplasm, and Need for vaccination were pertinent to this visit.    Current Outpatient Medications   Medication Sig    coenzyme Q-10 30 MG capsule Take 60 mg by mouth every day.    celecoxib (CELEBREX) 200 MG Cap Take 1 Capsule by mouth 2 times a day as needed for Moderate Pain.    Efinaconazole 10 % Solution Apply 1 Application topically every day. To affected toenail for 48 weeks.    Semaglutide,0.25 or 0.5MG/DOS, (OZEMPIC, 0.25 OR 0.5 MG/DOSE,) 2 MG/3ML Solution Pen-injector Inject 0.5 mg under the skin every 7 days.    rosuvastatin (CRESTOR) 20 MG Tab Take 1 Tablet by mouth every evening.    losartan (COZAAR) 25 MG Tab Take 1 Tablet by mouth every day.    fenofibrate (TRICOR) 145 MG Tab Take 1 Tablet by mouth every day.    amLODIPine (NORVASC) 10 MG Tab Take 1 Tablet by mouth every day.    atenolol (TENORMIN) 50 MG Tab Take 1 Tablet by mouth every day.    metformin (GLUCOPHAGE) 1000 MG tablet Take 1 Tablet by mouth 2 times a day.    pioglitazone (ACTOS) 30 MG Tab Take 1 Tablet by mouth every day.    glucose blood (RELION TRUE METRIX TEST STRIPS) strip 1 Strip by Other route 2 times a day.    Empagliflozin (JARDIANCE) 25 MG Tab Take 25 mg by mouth every day.    cetirizine (ZYRTEC) 10 MG Tab Take 10 mg by mouth every day.    Multiple Vitamins-Minerals (MULTIVITAMIN ADULT PO) Take  by mouth.    Multiple Vitamins-Minerals (EYE VITAMINS PO) Take  by mouth.    FERROUS SULFATE PO 65 mg tablet one tablet once daily        Social History     Socioeconomic History    Marital status: Single    Highest education level: 12th grade   Tobacco Use    Smoking status: Every Day     Current packs/day: 0.50     Average packs/day: 0.5 packs/day for 10.0 years (5.0 ttl pk-yrs)     Types: Cigarettes    Smokeless tobacco: Never   Vaping Use    Vaping Use: Never used   Substance and Sexual Activity    Alcohol use: Yes     Comment: 3-5 times a year one drink    Drug  use: Never    Sexual activity: Yes     Partners: Male     Birth control/protection: Surgical     Comment: one partner      Social Determinants of Health     Financial Resource Strain: High Risk (9/26/2023)    Overall Financial Resource Strain (CARDIA)     Difficulty of Paying Living Expenses: Hard   Food Insecurity: Food Insecurity Present (9/26/2023)    Hunger Vital Sign     Worried About Running Out of Food in the Last Year: Sometimes true     Ran Out of Food in the Last Year: Sometimes true   Transportation Needs: No Transportation Needs (9/26/2023)    PRAPARE - Transportation     Lack of Transportation (Medical): No     Lack of Transportation (Non-Medical): No   Physical Activity: Unknown (9/26/2023)    Exercise Vital Sign     Days of Exercise per Week: 5 days     Minutes of Exercise per Session: Patient refused   Stress: No Stress Concern Present (9/26/2023)    [employer] [place] [employer] - Occupational Stress Questionnaire     Feeling of Stress : Only a little   Social Connections: Unknown (9/26/2023)    Social Connection and Isolation Panel [NHANES]     Frequency of Communication with Friends and Family: More than three times a week     Frequency of Social Gatherings with Friends and Family: Patient refused     Attends Voodoo Services: Patient refused     Active Member of Clubs or Organizations: No     Attends Club or Organization Meetings: Patient refused     Marital Status:    Housing Stability: Low Risk  (9/26/2023)    Housing Stability Vital Sign     Unable to Pay for Housing in the Last Year: No     Number of Places Lived in the Last Year: 1     Unstable Housing in the Last Year: No       Family History   Problem Relation Age of Onset    Diabetes Sister     Hypertension Sister     Thyroid Sister     Cancer Mother         skin cancer    Hypertension Mother     Thyroid Mother     Hypertension Father     Thyroid Father     Hyperlipidemia Father     Glaucoma Maternal Grandmother      "Cancer Paternal Grandfather         throat cancer    Diabetes Sister     Diabetes Son          Health Maintenance: Completed      ROS:   Review of Systems   Constitutional:  Negative for chills and fever.   Eyes:  Negative for blurred vision.   Respiratory:  Negative for shortness of breath.    Cardiovascular:  Negative for chest pain and palpitations.   Gastrointestinal:  Negative for abdominal pain, diarrhea, nausea and vomiting.   Genitourinary:  Negative for dysuria.   Musculoskeletal:  Positive for joint pain.   Neurological:  Negative for dizziness and headaches.       Objective:     Exam: /76 (BP Location: Left arm, Patient Position: Sitting, BP Cuff Size: Adult)   Pulse 91   Temp 36.9 °C (98.5 °F) (Temporal)   Ht 1.575 m (5' 2\")   Wt 82.6 kg (182 lb 3.2 oz)   SpO2 95%  Body mass index is 33.32 kg/m².    Physical Exam  Constitutional:       General: She is not in acute distress.  HENT:      Mouth/Throat:      Mouth: Mucous membranes are moist.   Eyes:      Extraocular Movements: Extraocular movements intact.   Cardiovascular:      Rate and Rhythm: Normal rate and regular rhythm.      Heart sounds: No murmur heard.     No friction rub. No gallop.   Pulmonary:      Effort: Pulmonary effort is normal.      Breath sounds: No wheezing, rhonchi or rales.   Musculoskeletal:      Cervical back: Normal range of motion and neck supple.      Right lower leg: No edema.      Left lower leg: No edema.      Comments: Wrist/Hand: No swelling or bruising noted.  There is a firm swelling noted over the dorsum of the wrist.  Tenderness to palpation over this area. No tenderness at snuffbox. Range of motion intact. Strength and sensation intact.  Good  strength. 2+ radial pulse.   Knee: Full ROM, strength 5/5 bilaterally, TTP none, edema none, no bruising, no joint laxity.   Lymphadenopathy:      Cervical: No cervical adenopathy.   Skin:     General: Skin is warm and dry.   Neurological:      Mental Status: She is " alert.   Psychiatric:         Mood and Affect: Mood normal.         Labs: Recent blood work reviewed.    Assessment & Plan:     52 y.o. female with the following -    1. Essential hypertension  Chronic, stable.  Continue current regimen.    2. Type 2 diabetes mellitus with hyperglycemia, without long-term current use of insulin (HCC)  Chronic, stable.  Continue current regimen.  Microalbumin/creatinine ratio due, ordered today.  Plan to do diabetic foot exam at next visit.    3. Right wrist pain  Chronic problem, although patient has noticed a new swelling versus nodule of the dorsum of her wrist which is tender to palpation.  We will get an x-ray to evaluate.  - DX-WRIST-COMPLETE 3+ RIGHT; Future  - celecoxib (CELEBREX) 200 MG Cap; Take 1 Capsule by mouth 2 times a day as needed for Moderate Pain.  Dispense: 60 Capsule; Refill: 0    4. Acute pain of right knee  Acute, atraumatic knee pain, onset about 3 months ago.  Will trial conservative treatments with rest, gentle range of motion exercises, and anti-inflammatories.  I have prescribed Celebrex, advised against any other OTC anti-inflammatories while taking this.  Can take Tylenol in addition as needed.  - celecoxib (CELEBREX) 200 MG Cap; Take 1 Capsule by mouth 2 times a day as needed for Moderate Pain.  Dispense: 60 Capsule; Refill: 0    5. Mixed dyslipidemia  Chronic, controlled.  Continue current regimen.    6. Onychia of toe, left  Chronic problem.  Affects the distal lateral aspect of the left second toe.  Will try to treat with topical solution.  Patient understands that she will likely need to take this for 48 weeks or mor  - Efinaconazole 10 % Solution; Apply 1 Application topically every day. To affected toenail for 48 weeks.  Dispense: 8 mL; Refill: 1    7. Encounter for screening for colorectal malignant neoplasm  - SUE (FIT DNA)    8. Need for vaccination  - Hep B Adult 20+  - Influenza Vaccine Quad Injection (PF)        Return in about 6 weeks  (around 11/8/2023).    Please note that this dictation was created using voice recognition software. I have made every reasonable attempt to correct obvious errors, but I expect that there are errors of grammar and possibly content that I did not discover before finalizing the note.

## 2023-10-05 ENCOUNTER — HOSPITAL ENCOUNTER (OUTPATIENT)
Dept: RADIOLOGY | Facility: MEDICAL CENTER | Age: 53
End: 2023-10-05
Attending: STUDENT IN AN ORGANIZED HEALTH CARE EDUCATION/TRAINING PROGRAM
Payer: COMMERCIAL

## 2023-10-05 DIAGNOSIS — M25.531 RIGHT WRIST PAIN: ICD-10-CM

## 2023-10-05 PROCEDURE — 73110 X-RAY EXAM OF WRIST: CPT | Mod: RT

## 2023-10-26 DIAGNOSIS — M25.561 ACUTE PAIN OF RIGHT KNEE: ICD-10-CM

## 2023-10-26 DIAGNOSIS — M25.531 RIGHT WRIST PAIN: ICD-10-CM

## 2023-10-26 RX ORDER — CELECOXIB 200 MG/1
CAPSULE ORAL
Qty: 60 CAPSULE | Refills: 0 | Status: SHIPPED | OUTPATIENT
Start: 2023-10-26 | End: 2023-11-20

## 2023-11-08 ENCOUNTER — OFFICE VISIT (OUTPATIENT)
Dept: MEDICAL GROUP | Facility: PHYSICIAN GROUP | Age: 53
End: 2023-11-08
Payer: COMMERCIAL

## 2023-11-08 VITALS
DIASTOLIC BLOOD PRESSURE: 70 MMHG | TEMPERATURE: 96.3 F | OXYGEN SATURATION: 96 % | HEART RATE: 80 BPM | BODY MASS INDEX: 33.82 KG/M2 | WEIGHT: 183.8 LBS | SYSTOLIC BLOOD PRESSURE: 122 MMHG | HEIGHT: 62 IN

## 2023-11-08 DIAGNOSIS — M19.031 PRIMARY OSTEOARTHRITIS OF RIGHT WRIST: ICD-10-CM

## 2023-11-08 DIAGNOSIS — Z11.4 SCREENING FOR HIV (HUMAN IMMUNODEFICIENCY VIRUS): ICD-10-CM

## 2023-11-08 DIAGNOSIS — Z11.59 NEED FOR HEPATITIS C SCREENING TEST: ICD-10-CM

## 2023-11-08 DIAGNOSIS — E11.65 TYPE 2 DIABETES MELLITUS WITH HYPERGLYCEMIA, WITHOUT LONG-TERM CURRENT USE OF INSULIN (HCC): ICD-10-CM

## 2023-11-08 DIAGNOSIS — M25.531 RIGHT WRIST PAIN: ICD-10-CM

## 2023-11-08 PROCEDURE — 3078F DIAST BP <80 MM HG: CPT | Performed by: STUDENT IN AN ORGANIZED HEALTH CARE EDUCATION/TRAINING PROGRAM

## 2023-11-08 PROCEDURE — 3074F SYST BP LT 130 MM HG: CPT | Performed by: STUDENT IN AN ORGANIZED HEALTH CARE EDUCATION/TRAINING PROGRAM

## 2023-11-08 PROCEDURE — 99214 OFFICE O/P EST MOD 30 MIN: CPT | Performed by: STUDENT IN AN ORGANIZED HEALTH CARE EDUCATION/TRAINING PROGRAM

## 2023-11-08 ASSESSMENT — ENCOUNTER SYMPTOMS
HEADACHES: 0
CHILLS: 0
DIZZINESS: 0
SHORTNESS OF BREATH: 0
SENSORY CHANGE: 0
TINGLING: 0
FEVER: 0

## 2023-11-08 NOTE — LETTER
[facility]  Cindy Galloway P.A.-C.  [address]  [place] [address]  Fax: [phone]   Authorization for Release/Disclosure of   Protected Health Information   Name: MOOSE GODINEZ : 1970 SSN: [id number]   Address: [address] [place] [address]  [place] [address] Phone:    [phone] (home)    I authorize the entity listed below to release/disclose the PHI below to:   [facility]/Cindy Galloway P.A.-C. and Cindy Galloway P.A.-C.   Provider or Entity Name:  [facility]   Address   City, State, [facility]   Phone:      Fax:     Reason for request: continuity of care   Information to be released:    [  ] LAST COLONOSCOPY,  including any PATH REPORT and follow-up  [  ] LAST FIT/COLOGUARD RESULT [  ] LAST DEXA  [  ] LAST MAMMOGRAM  [ XXX ] LAST PAP  [  ] LAST LABS [  ] RETINA EXAM REPORT  [  ] IMMUNIZATION RECORDS  [  ] Release all info      [  ] Check here and initial the line next to each item to release ALL health information INCLUDING  _____ Care and treatment for drug and / or alcohol abuse  _____ HIV testing, infection status, or AIDS  _____ Genetic Testing    DATES OF SERVICE OR TIME PERIOD TO BE DISCLOSED: _____________  I understand and acknowledge that:  * This Authorization may be revoked at any time by you in writing, except if your health information has already been used or disclosed.  * Your health information that will be used or disclosed as a result of you signing this authorization could be re-disclosed by the recipient. If this occurs, your re-disclosed health information may no longer be protected by State or Federal laws.  * You may refuse to sign this Authorization. Your refusal will not affect your ability to obtain treatment.  * This Authorization becomes effective upon signing and will  on (date) __________.      If no date is indicated, this Authorization will  one (1) year from the signature date.    Name: Moose Godinez  Signature: Date:    11/8/2023     PLEASE FAX REQUESTED RECORDS BACK TO: [phone]

## 2023-11-09 NOTE — PROGRESS NOTES
"Subjective:     CC:   Chief Complaint   Patient presents with    Follow-Up     X ray     Medication Refill    Foot Injury     Left foot       HPI:   Sanna presents today with    Problem   Right Wrist Pain    Chronic, intermittent right wrist pain.  Worse in the past few months.  Patient has noticed a bump on the top of her wrist which is painful.    X-ray 10/5/2023:  There is no significant focal swelling.  There is no evidence of displaced  fracture or dislocation.  There is mild degenerative sclerosis and spurring of the right 1st CMC joint with mild subluxation.     Type 2 Diabetes Mellitus With Hyperglycemia, Without Long-Term Current Use of Insulin (Hcc)    This is a chronic condition.  Current medications:  -Metformin 1000 mg daily  -Ozempic 0.5 mg weekly  -Jardiance 25 mg daily  Medications are managed by clinical pharmacist.  Last A1c: 7.4%, 7/25/2023.  Improved from 8.0%, 4/5/2023.  Last Microalb/Cr ratio: WNL, 9/1/2022.    Last diabetic foot exam: 11/8/23  Last retinal eye exam: 4/10/23             Past medical, family, and social history reviewed by me in Epic chart today.   Medications and allergies reviewed in Epic chart by me today.       Health Maintenance: Completed    ROS:  Review of Systems   Constitutional:  Negative for chills and fever.   Respiratory:  Negative for shortness of breath.    Cardiovascular:  Negative for chest pain.   Musculoskeletal:  Positive for joint pain.   Neurological:  Negative for dizziness, tingling, sensory change and headaches.       Objective:     Exam:  /70 (BP Location: Left arm, Patient Position: Sitting, BP Cuff Size: Adult)   Pulse 80   Temp (!) 35.7 °C (96.3 °F) (Temporal)   Ht 1.575 m (5' 2\")   Wt 83.4 kg (183 lb 12.8 oz)   SpO2 96%   BMI 33.62 kg/m²  Body mass index is 33.62 kg/m².    Physical Exam  Vitals reviewed.   Constitutional:       General: She is not in acute distress.  Eyes:      Extraocular Movements: Extraocular movements intact. "   Cardiovascular:      Rate and Rhythm: Normal rate and regular rhythm.      Heart sounds: No murmur heard.     No friction rub. No gallop.   Pulmonary:      Effort: Pulmonary effort is normal.      Breath sounds: No wheezing, rhonchi or rales.   Musculoskeletal:      Cervical back: Neck supple.      Right lower leg: No edema.      Left lower leg: No edema.   Feet:      Comments: Monofilament testing with a 10 gram force: sensation: intact bilaterally  Visual Inspection: Feet without maceration, ulcers, or fissures.  Pedal pulses: intact bilaterally  Skin:     General: Skin is warm and dry.   Neurological:      Mental Status: She is alert.   Psychiatric:         Mood and Affect: Mood normal.           Assessment & Plan:     53 y.o. female with the following -     1. Right wrist pain  2. Primary osteoarthritis of right wrist  Chronic in nature, with recent exacerbation in symptoms over the last few months along with a painful bump to the dorsum of the wrist in the area of spurring which was noted on recent x-ray.  Patient states this is causing her significant pain at work.  Recommend evaluation with orthopedics, referral placed.  Reiterated conservative treatment options for pain control.  - Referral to Orthopedics    3. Type 2 diabetes mellitus with hyperglycemia, without long-term current use of insulin (HCC)  Chronic, stable.  Continue current medication regimen.  Diabetic screenings are up-to-date or were completed today.  Follow-up with clinical pharmacist for management of medications.  - Diabetic Monofilament LE Exam    4. Need for hepatitis C screening test  - HEP C VIRUS ANTIBODY; Future    5. Screening for HIV (human immunodeficiency virus)  - HIV AG/AB COMBO ASSAY SCREENING; Future          Return in about 3 months (around 2/8/2024) for diabetes follow up.    Please note that this dictation was created using voice recognition software. I have made every reasonable attempt to correct obvious errors, but I  expect that there are errors of grammar and possibly content that I did not discover before finalizing the note.

## 2023-11-19 DIAGNOSIS — M25.561 ACUTE PAIN OF RIGHT KNEE: ICD-10-CM

## 2023-11-19 DIAGNOSIS — M25.531 RIGHT WRIST PAIN: ICD-10-CM

## 2023-11-20 RX ORDER — CELECOXIB 200 MG/1
CAPSULE ORAL
Qty: 60 CAPSULE | Refills: 0 | Status: SHIPPED | OUTPATIENT
Start: 2023-11-20 | End: 2023-12-26

## 2023-12-07 ENCOUNTER — HOSPITAL ENCOUNTER (OUTPATIENT)
Dept: LAB | Facility: MEDICAL CENTER | Age: 53
End: 2023-12-07
Attending: STUDENT IN AN ORGANIZED HEALTH CARE EDUCATION/TRAINING PROGRAM
Payer: COMMERCIAL

## 2023-12-07 ENCOUNTER — HOSPITAL ENCOUNTER (OUTPATIENT)
Dept: LAB | Facility: MEDICAL CENTER | Age: 53
End: 2023-12-07
Attending: NURSE PRACTITIONER
Payer: COMMERCIAL

## 2023-12-07 DIAGNOSIS — Z11.59 NEED FOR HEPATITIS C SCREENING TEST: ICD-10-CM

## 2023-12-07 DIAGNOSIS — Z11.4 SCREENING FOR HIV (HUMAN IMMUNODEFICIENCY VIRUS): ICD-10-CM

## 2023-12-07 DIAGNOSIS — E11.9 TYPE 2 DIABETES MELLITUS WITHOUT COMPLICATION, WITHOUT LONG-TERM CURRENT USE OF INSULIN (HCC): ICD-10-CM

## 2023-12-07 LAB
ALBUMIN SERPL BCP-MCNC: 4.7 G/DL (ref 3.2–4.9)
ALBUMIN/GLOB SERPL: 1.6 G/DL
ALP SERPL-CCNC: 42 U/L (ref 30–99)
ALT SERPL-CCNC: 21 U/L (ref 2–50)
ANION GAP SERPL CALC-SCNC: 11 MMOL/L (ref 7–16)
AST SERPL-CCNC: 10 U/L (ref 12–45)
BILIRUB SERPL-MCNC: 0.2 MG/DL (ref 0.1–1.5)
BUN SERPL-MCNC: 14 MG/DL (ref 8–22)
CALCIUM ALBUM COR SERPL-MCNC: 9.3 MG/DL (ref 8.5–10.5)
CALCIUM SERPL-MCNC: 9.9 MG/DL (ref 8.5–10.5)
CHLORIDE SERPL-SCNC: 105 MMOL/L (ref 96–112)
CHOLEST SERPL-MCNC: 122 MG/DL (ref 100–199)
CO2 SERPL-SCNC: 23 MMOL/L (ref 20–33)
CREAT SERPL-MCNC: 0.58 MG/DL (ref 0.5–1.4)
CREAT UR-MCNC: 72.23 MG/DL
EST. AVERAGE GLUCOSE BLD GHB EST-MCNC: 157 MG/DL
GFR SERPLBLD CREATININE-BSD FMLA CKD-EPI: 107 ML/MIN/1.73 M 2
GLOBULIN SER CALC-MCNC: 2.9 G/DL (ref 1.9–3.5)
GLUCOSE SERPL-MCNC: 140 MG/DL (ref 65–99)
HBA1C MFR BLD: 7.1 % (ref 4–5.6)
HCV AB SER QL: NORMAL
HDLC SERPL-MCNC: 48 MG/DL
HIV 1+2 AB+HIV1 P24 AG SERPL QL IA: NORMAL
LDLC SERPL CALC-MCNC: 57 MG/DL
MICROALBUMIN UR-MCNC: 1.4 MG/DL
MICROALBUMIN/CREAT UR: 19 MG/G (ref 0–30)
POTASSIUM SERPL-SCNC: 4.4 MMOL/L (ref 3.6–5.5)
PROT SERPL-MCNC: 7.6 G/DL (ref 6–8.2)
SODIUM SERPL-SCNC: 139 MMOL/L (ref 135–145)
TRIGL SERPL-MCNC: 86 MG/DL (ref 0–149)

## 2023-12-07 PROCEDURE — 87389 HIV-1 AG W/HIV-1&-2 AB AG IA: CPT

## 2023-12-07 PROCEDURE — 83036 HEMOGLOBIN GLYCOSYLATED A1C: CPT

## 2023-12-07 PROCEDURE — 82570 ASSAY OF URINE CREATININE: CPT

## 2023-12-07 PROCEDURE — 80061 LIPID PANEL: CPT

## 2023-12-07 PROCEDURE — 80053 COMPREHEN METABOLIC PANEL: CPT

## 2023-12-07 PROCEDURE — 86803 HEPATITIS C AB TEST: CPT

## 2023-12-07 PROCEDURE — 82043 UR ALBUMIN QUANTITATIVE: CPT

## 2023-12-07 PROCEDURE — 36415 COLL VENOUS BLD VENIPUNCTURE: CPT

## 2023-12-11 ENCOUNTER — NON-PROVIDER VISIT (OUTPATIENT)
Dept: MEDICAL GROUP | Facility: PHYSICIAN GROUP | Age: 53
End: 2023-12-11
Payer: COMMERCIAL

## 2023-12-11 PROCEDURE — 99999 PR NO CHARGE: CPT | Performed by: FAMILY MEDICINE

## 2023-12-11 RX ORDER — GLIPIZIDE 5 MG/1
5 TABLET ORAL DAILY
Qty: 30 TABLET | Refills: 6 | Status: SHIPPED | OUTPATIENT
Start: 2023-12-11

## 2023-12-11 NOTE — PROGRESS NOTES
Patient Consult Note     TIME IN: 1114   TIME OUT:        Primary care physician: Lee Ghotra P.A.-C.    Reason for consult: Management of Uncontrolled Type 2 Diabetes    HPI:  Sanna Yuen is a 52 y.o. old patient who comes in today for evaluation of above stated problem.    Allergies:  Lipitor [atorvastatin], Niacin, and Penicillins    Most Recent labs, A1c, and immunizations:    Lab Results   Component Value Date/Time    HBA1C 7.1 (H) 12/07/2023 06:17 AM          Lab Results   Component Value Date/Time    CHOLSTRLTOT 122 12/07/2023 06:17 AM    LDL 57 12/07/2023 06:17 AM    HDL 48 12/07/2023 06:17 AM    TRIGLYCERIDE 86 12/07/2023 06:17 AM       Lab Results   Component Value Date/Time    SODIUM 139 12/07/2023 06:17 AM    POTASSIUM 4.4 12/07/2023 06:17 AM    CHLORIDE 105 12/07/2023 06:17 AM    CO2 23 12/07/2023 06:17 AM    GLUCOSE 140 (H) 12/07/2023 06:17 AM    BUN 14 12/07/2023 06:17 AM    CREATININE 0.58 12/07/2023 06:17 AM     Lab Results   Component Value Date/Time    ALKPHOSPHAT 42 12/07/2023 06:17 AM    ASTSGOT 10 (L) 12/07/2023 06:17 AM    ALTSGPT 21 12/07/2023 06:17 AM    TBILIRUBIN 0.2 12/07/2023 06:17 AM    ALBUMIN 4.7 12/07/2023 06:17 AM      Lab Results   Component Value Date/Time    MALBCRT 19 12/07/2023 06:17 AM    MICROALBUR 1.4 12/07/2023 06:17 AM     Immunization History   Administered Date(s) Administered    Influenza (IM) Preservative Free - HISTORICAL DATA 09/18/2011, 10/06/2014, 10/26/2015    Influenza Seasonal Injectable - Historical Data 12/09/2012, 10/28/2016    Influenza Vac Subunit Quad Inj (Pf) 10/26/2017, 10/09/2020    Influenza Vaccine Pediatric Split - Historical Data 05/22/2007, 03/11/2008, 09/15/2010    Influenza Vaccine Quad Inj (Pf) 09/28/2019, 09/16/2021    Influenza Vaccine Quad Inj (Preserved) 10/17/2018    [facility] SARS-CoV-2 Vaccine 04/28/2021    MODERNA SARS-COV-2 VACCINE (12+) 12/01/2021    Pneumococcal polysaccharide vaccine (PPSV-23) 09/07/2018    TD Vaccine  05/22/2007    Tdap Vaccine 09/07/2018    Zoster Vaccine Recombinant (RZV) (SHINGRIX) 11/01/2021           Physical Examination:  Vital signs: There were no vitals taken for this visit. There is no height or weight on file to calculate BMI.      Medications:    Current Outpatient Medications:     celecoxib, TAKE 1 CAPSULE BY MOUTH TWICE A DAY AS NEEDED FOR MODERATE PAIN    coenzyme Q-10, 60 mg, Oral, DAILY    Ozempic (0.25 or 0.5 MG/DOSE), 0.5 mg, Subcutaneous, Q7 DAYS    rosuvastatin, 20 mg, Oral, Q EVENING    losartan, 25 mg, Oral, DAILY    fenofibrate, 145 mg, Oral, DAILY    amLODIPine, 10 mg, Oral, DAILY    atenolol, 50 mg, Oral, DAILY    metformin, 1,000 mg, Oral, BID    pioglitazone, 30 mg, Oral, DAILY    ReliOn True Metrix Test Strips, 1 Each, Other, BID    Jardiance, 25 mg, Oral, DAILY    cetirizine, 10 mg, Oral, DAILY    Multiple Vitamins-Minerals (MULTIVITAMIN ADULT PO), Take  by mouth.    Multiple Vitamins-Minerals (EYE VITAMINS PO), Take  by mouth.      Assessment and Plan:  1. DM2   Most recent A1c is: <8  Retinal exam completed in clinic today  Still smoking, talked about it for <5 min   Kidney function:  >60 ml/min creatinine clearance    Medication(s) recommended:   Cont. Metformin: 1000mg BID   Cont Actos 30 mg once daily   Cont. Jardiance 25mg   Cont Ozempic 0.5mg every 7 days   Start Glipizide 5mg daily (due to insurance requirements for GLP-1)     -Blood glucose and A1c target    However, more aggressive diabetic control is reasonable when tolerated.  Pt's treatment of Hypoglycemia. 15:15 Rule discussed with patient      2.  Cardiovascular  Is LDL <100: y  Is Blood pressure <130/80:  y  Medication(s) recommended.   Cont Crestor 20mg daily   Cont tricor 145mg  Cont losartan 25mg       3.  Lifestyle changes   Focus on eating a DASH/Mediterranean-style diet.   Exercise 30 minutes daily at least 5 days/week, as tolerated.  https://www.niddk.nih.gov/bwp      Follow-up appointment in 12  week(s)    Matthew Elias, PharmD, MS, BCACP, [facility]  Phone [phone] fax [phone]    This note was created using voice recognition software (Dragon). The accuracy of the dictation is limited by the abilities of the software. I have reviewed the note prior to signing, however some errors in grammar and context are still possible. If you have any questions related to this note please do not hesitate to contact our office.     CC:   Lee Ghotra, [email or web address].  No ref. provider found  Dr. Lee Sultana

## 2023-12-23 DIAGNOSIS — M25.531 RIGHT WRIST PAIN: ICD-10-CM

## 2023-12-23 DIAGNOSIS — M25.561 ACUTE PAIN OF RIGHT KNEE: ICD-10-CM

## 2023-12-26 RX ORDER — CELECOXIB 200 MG/1
CAPSULE ORAL
Qty: 60 CAPSULE | Refills: 0 | Status: SHIPPED | OUTPATIENT
Start: 2023-12-26

## 2024-02-08 ENCOUNTER — OFFICE VISIT (OUTPATIENT)
Dept: MEDICAL GROUP | Facility: PHYSICIAN GROUP | Age: 54
End: 2024-02-08
Payer: COMMERCIAL

## 2024-02-08 VITALS
HEIGHT: 62 IN | WEIGHT: 174.4 LBS | BODY MASS INDEX: 32.09 KG/M2 | OXYGEN SATURATION: 97 % | HEART RATE: 87 BPM | TEMPERATURE: 97.8 F | SYSTOLIC BLOOD PRESSURE: 112 MMHG | DIASTOLIC BLOOD PRESSURE: 62 MMHG

## 2024-02-08 DIAGNOSIS — R21 RASH AND NONSPECIFIC SKIN ERUPTION: ICD-10-CM

## 2024-02-08 DIAGNOSIS — E78.2 MIXED DYSLIPIDEMIA: ICD-10-CM

## 2024-02-08 DIAGNOSIS — E11.65 TYPE 2 DIABETES MELLITUS WITH HYPERGLYCEMIA, WITHOUT LONG-TERM CURRENT USE OF INSULIN (HCC): Chronic | ICD-10-CM

## 2024-02-08 DIAGNOSIS — L25.9 CONTACT DERMATITIS, UNSPECIFIED CONTACT DERMATITIS TYPE, UNSPECIFIED TRIGGER: ICD-10-CM

## 2024-02-08 DIAGNOSIS — Z12.31 ENCOUNTER FOR SCREENING MAMMOGRAM FOR MALIGNANT NEOPLASM OF BREAST: ICD-10-CM

## 2024-02-08 PROCEDURE — 99214 OFFICE O/P EST MOD 30 MIN: CPT | Performed by: STUDENT IN AN ORGANIZED HEALTH CARE EDUCATION/TRAINING PROGRAM

## 2024-02-08 PROCEDURE — 3078F DIAST BP <80 MM HG: CPT | Performed by: STUDENT IN AN ORGANIZED HEALTH CARE EDUCATION/TRAINING PROGRAM

## 2024-02-08 PROCEDURE — 3074F SYST BP LT 130 MM HG: CPT | Performed by: STUDENT IN AN ORGANIZED HEALTH CARE EDUCATION/TRAINING PROGRAM

## 2024-02-08 RX ORDER — TRIAMCINOLONE ACETONIDE 1 MG/G
1 CREAM TOPICAL 2 TIMES DAILY
Qty: 80 G | Refills: 0 | Status: SHIPPED | OUTPATIENT
Start: 2024-02-08

## 2024-02-08 ASSESSMENT — ENCOUNTER SYMPTOMS
SHORTNESS OF BREATH: 0
DIZZINESS: 0
HEADACHES: 0
CHILLS: 0
FEVER: 0

## 2024-02-08 ASSESSMENT — PATIENT HEALTH QUESTIONNAIRE - PHQ9: CLINICAL INTERPRETATION OF PHQ2 SCORE: 0

## 2024-02-08 NOTE — PROGRESS NOTES
"Subjective:     CC:   Chief Complaint   Patient presents with    Diabetes     diabetes follow up.    Rash     3 -4 days ago on both arms .    Medication Refill       HPI:   Sanna presents today with    Problem   Rash and Nonspecific Skin Eruption    This is a new problem, onset 4 days ago.  Patient has a bumpy, itchy red rash both forearms, slightly worse on the left side.  No known allergic or new exposures that she can think of.  No one in her [place] with a similar rash.       Type 2 Diabetes Mellitus With Hyperglycemia, Without Long-Term Current Use of Insulin (Hcc)    This is a chronic condition.  Current medications:  -Metformin 1000 mg daily  -Ozempic 0.5 mg weekly  -Jardiance 25 mg daily  -Actos 30 mg daily    Stopped taking glipizide due to side effects.    Medications are managed by clinical pharmacist.    Last A1c: 7.1%, 12/7/23  Last Microalb/Cr ratio: WNL, 12/7/23   Last diabetic foot exam: 11/8/23  Last retinal eye exam: 4/10/23       Mixed Dyslipidemia    Chronic in nature.  Current medications:  Rosuvastatin 20 mg daily  Tricor 145 mg daily    Lab Results   Component Value Date/Time    CHOLSTRLTOT 122 12/07/2023 0617    TRIGLYCERIDE 86 12/07/2023 0617    HDL 48 12/07/2023 0617    LDL 57 12/07/2023 0617              Past medical, family, and social history reviewed by me in Epic chart today.   Medications and allergies reviewed in Epic chart by me today.       Health Maintenance: Completed    ROS:  Review of Systems   Constitutional:  Negative for chills and fever.   Respiratory:  Negative for shortness of breath.    Cardiovascular:  Negative for chest pain.   Skin:  Positive for itching and rash.   Neurological:  Negative for dizziness and headaches.       Objective:     Exam:  /62 (BP Location: Left arm, Patient Position: Sitting, BP Cuff Size: Adult)   Pulse 87   Temp 36.6 °C (97.8 °F) (Temporal)   Ht 1.575 m (5' 2\")   Wt 79.1 kg (174 lb 6.4 oz)   SpO2 97%   BMI 31.90 kg/m²  Body mass " index is 31.9 kg/m².    Physical Exam  Vitals reviewed.   Constitutional:       General: She is not in acute distress.  Eyes:      Extraocular Movements: Extraocular movements intact.   Cardiovascular:      Rate and Rhythm: Normal rate and regular rhythm.      Heart sounds: No murmur heard.     No friction rub. No gallop.   Pulmonary:      Effort: Pulmonary effort is normal.      Breath sounds: No wheezing, rhonchi or rales.   Musculoskeletal:      Cervical back: Neck supple.   Skin:     General: Skin is warm and dry.      Comments: Mildly erythematous maculopapular rash is noted to the dorsal aspect of bilateral forearms.   Neurological:      Mental Status: She is alert.   Psychiatric:         Mood and Affect: Mood normal.           Assessment & Plan:     53 y.o. female with the following -     1. Type 2 diabetes mellitus with hyperglycemia, without long-term current use of insulin (HCC)  Chronic, stable.  Continue current medication regimen.  Continue follow-up with clinical pharmacist as directed.    2. Mixed dyslipidemia  Chronic, controlled.  Continue current medication regimen.    3. Rash and nonspecific skin eruption  4. Contact dermatitis, unspecified contact dermatitis type, unspecified trigger  Acute problem, symptoms x 4 days.  Patient has a rash to both forearms consistent with contact dermatitis, although she is not sure of any triggers.  Will treat with topical triamcinolone cream.  I also advised the patient to keep an eye out for any potential triggers and try to eliminate them.  Patient to follow-up if symptoms persist after 2 weeks or for worsening symptoms.  - triamcinolone acetonide (KENALOG) 0.1 % Cream; Apply 1 Application topically 2 times a day. Apply to affected areas. Use for up to two weeks.  Dispense: 80 g; Refill: 0    5. Encounter for screening mammogram for malignant neoplasm of breast  - MA-SCREENING MAMMO BILAT W/TOMOSYNTHESIS W/CAD; Future          Return in about 4 months (around  6/8/2024) for follow up.    Please note that this dictation was created using voice recognition software. I have made every reasonable attempt to correct obvious errors, but I expect that there are errors of grammar and possibly content that I did not discover before finalizing the note.

## 2024-02-22 RX ORDER — ROSUVASTATIN CALCIUM 20 MG/1
20 TABLET, COATED ORAL EVERY EVENING
Qty: 90 TABLET | Refills: 1 | Status: SHIPPED
Start: 2024-02-22

## 2024-02-22 NOTE — TELEPHONE ENCOUNTER
Received request via: Pharmacy    Was the patient seen in the last year in this department? Yes    Does the patient have an active prescription (recently filled or refills available) for medication(s) requested? No    Pharmacy Name:     Does the patient have alf Plus and need 100 day supply (blood pressure, diabetes and cholesterol meds only)? Patient does not have SCP

## 2024-03-13 ENCOUNTER — OFFICE VISIT (OUTPATIENT)
Dept: URGENT CARE | Facility: CLINIC | Age: 54
End: 2024-03-13
Payer: COMMERCIAL

## 2024-03-13 VITALS
DIASTOLIC BLOOD PRESSURE: 78 MMHG | OXYGEN SATURATION: 97 % | WEIGHT: 166 LBS | SYSTOLIC BLOOD PRESSURE: 124 MMHG | HEART RATE: 106 BPM | RESPIRATION RATE: 16 BRPM | BODY MASS INDEX: 30.55 KG/M2 | HEIGHT: 62 IN | TEMPERATURE: 97.4 F

## 2024-03-13 DIAGNOSIS — R53.83 OTHER FATIGUE: ICD-10-CM

## 2024-03-13 PROCEDURE — 3074F SYST BP LT 130 MM HG: CPT

## 2024-03-13 PROCEDURE — 99213 OFFICE O/P EST LOW 20 MIN: CPT

## 2024-03-13 PROCEDURE — 3078F DIAST BP <80 MM HG: CPT

## 2024-03-13 NOTE — LETTER
March 13, 2024    To Whom It May Concern:         This is confirmation that Sanna Yuen attended her scheduled appointment with NIMA Castro on 3/13/24. Please excuse from work tomorrow 3/14/24. Thank you for making accommodations for rest and recovery.          If you have any questions please do not hesitate to call me at the phone number listed below.    Sincerely,          SANA CastroRKasiN.  [phone]

## 2024-03-14 ENCOUNTER — HOSPITAL ENCOUNTER (OUTPATIENT)
Dept: LAB | Facility: MEDICAL CENTER | Age: 54
End: 2024-03-14
Payer: COMMERCIAL

## 2024-03-14 DIAGNOSIS — R53.83 OTHER FATIGUE: ICD-10-CM

## 2024-03-14 LAB — TSH SERPL DL<=0.005 MIU/L-ACNC: 1.82 UIU/ML (ref 0.38–5.33)

## 2024-03-14 PROCEDURE — 84443 ASSAY THYROID STIM HORMONE: CPT

## 2024-03-14 PROCEDURE — 36415 COLL VENOUS BLD VENIPUNCTURE: CPT

## 2024-03-14 NOTE — PROGRESS NOTES
CHIEF COMPLAINT  Chief Complaint   Patient presents with    Fatigue     Sx a week ago      Subjective:   Sanna Yuen is a 53 y.o. female who presents to urgent care with symptoms of fatigue and nausea.  Patient reports symptoms of fatigue starting approximately 1 week ago.  She reports that the fatigue is so bad occasionally she needs to take a nap at work.  She denies any symptoms of fever or chills.  She denies any shortness of breath or palpitations.  She does report mild nausea but denies any episodes of vomiting.  She denies any symptoms of constipation or diarrhea.  She denies any new medications or changes to routine.  Patient does report that she has a history of hypothyroid in her family.  She denies any other pertinent past medical history.        Review of Systems   Constitutional:  Positive for malaise/fatigue. Negative for chills and fever.   Gastrointestinal:  Positive for nausea.       PAST MEDICAL HISTORY  Patient Active Problem List    Diagnosis Date Noted    Rash and nonspecific skin eruption 02/08/2024    Right wrist pain 09/27/2023    Acute pain of right knee 09/27/2023    Onychia of toe, left 09/27/2023    Osteoarthrosis, hip 05/12/2022    Chronic right shoulder pain 11/01/2021    Osteoarthritis of hip 09/13/2021    Type 2 diabetes mellitus with hyperglycemia, without long-term current use of insulin (HCC) 07/28/2021    Essential hypertension 07/28/2021    Mixed dyslipidemia 07/28/2021    Hot flashes due to menopause 07/28/2021    Right leg injury 07/28/2021    Class 1 obesity due to excess calories with serious comorbidity and body mass index (BMI) of 31.0 to 31.9 in adult 07/28/2021    Cigarette smoker one half pack a day or less 07/28/2021       SURGICAL HISTORY   has a past surgical history that includes primary c section; tubal coagulation laparoscopic bilateral; cholecystectomy; and total hip arthroplasty (Right, 5/12/2022).    ALLERGIES  Allergies   Allergen Reactions     Lipitor [Atorvastatin] Myalgia     Gave her muscle cramps and caused pain in her legs    Niacin Itching     Flushed face    Penicillins Itching and Unspecified     Mouth sores       CURRENT MEDICATIONS  Home Medications       Reviewed by Rob Ag't (Medical Assistant) on 03/13/24 at 1708  Med List Status: <None>     Medication Last Dose Status   amLODIPine (NORVASC) 10 MG Tab  Active   atenolol (TENORMIN) 50 MG Tab  Active   celecoxib (CELEBREX) 200 MG Cap  Active   cetirizine (ZYRTEC) 10 MG Tab  Active   coenzyme Q-10 30 MG capsule  Active   Empagliflozin (JARDIANCE) 25 MG Tab  Active   fenofibrate (TRICOR) 145 MG Tab  Active   glipiZIDE (GLUCOTROL) 5 MG Tab  Active   glucose blood (RELION TRUE METRIX TEST STRIPS) strip  Active   losartan (COZAAR) 25 MG Tab  Active   metformin (GLUCOPHAGE) 1000 MG tablet  Active   Multiple Vitamins-Minerals (EYE VITAMINS PO)  Active   Multiple Vitamins-Minerals (MULTIVITAMIN ADULT PO)  Active   pioglitazone (ACTOS) 30 MG Tab  Active   rosuvastatin (CRESTOR) 20 MG Tab  Active   Semaglutide,0.25 or 0.5MG/DOS, (OZEMPIC, 0.25 OR 0.5 MG/DOSE,) 2 MG/3ML Solution Pen-injector  Active   triamcinolone acetonide (KENALOG) 0.1 % Cream  Active                    SOCIAL HISTORY  Social History     Tobacco Use    Smoking status: Every Day     Current packs/day: 0.50     Average packs/day: 0.5 packs/day for 10.0 years (5.0 ttl pk-yrs)     Types: Cigarettes    Smokeless tobacco: Never   Vaping Use    Vaping Use: Never used   Substance and Sexual Activity    Alcohol use: Yes     Comment: 3-5 times a year one drink    Drug use: Never    Sexual activity: Yes     Partners: Male     Birth control/protection: Surgical     Comment: one partner        FAMILY HISTORY  Family History   Problem Relation Age of Onset    Diabetes Sister     Hypertension Sister     Thyroid Sister     Cancer Mother         skin cancer    Hypertension Mother     Thyroid Mother     Hypertension Father      "Thyroid Father     Hyperlipidemia Father     Glaucoma Maternal Grandmother     Cancer Paternal Grandfather         throat cancer    Diabetes Sister     Diabetes Son          Medications, Allergies, and current problem list reviewed today in Epic.     Objective:     /78 (BP Location: Left arm, Patient Position: Sitting, BP Cuff Size: Adult)   Pulse (!) 106   Temp 36.3 °C (97.4 °F) (Temporal)   Resp 16   Ht 1.575 m (5' 2\")   Wt 75.3 kg (166 lb)   SpO2 97%     Physical Exam  Vitals reviewed.   Constitutional:       General: She is not in acute distress.     Appearance: Normal appearance. She is not ill-appearing or toxic-appearing.   HENT:      Head: Normocephalic.      Right Ear: Tympanic membrane normal.      Left Ear: Tympanic membrane normal.      Nose: Nose normal.      Mouth/Throat:      Mouth: Mucous membranes are moist.      Pharynx: Oropharynx is clear.   Cardiovascular:      Rate and Rhythm: Normal rate and regular rhythm.      Pulses: Normal pulses.      Heart sounds: Normal heart sounds.   Pulmonary:      Effort: Pulmonary effort is normal. No respiratory distress.      Breath sounds: Normal breath sounds. No stridor. No wheezing or rales.   Abdominal:      Palpations: There is no hepatomegaly, splenomegaly, mass or pulsatile mass.      Tenderness: There is no abdominal tenderness.   Musculoskeletal:      Cervical back: Normal range of motion. No rigidity or tenderness.      Right lower leg: No edema.      Left lower leg: No edema.   Lymphadenopathy:      Cervical: No cervical adenopathy.   Skin:     General: Skin is warm.      Capillary Refill: Capillary refill takes less than 2 seconds.   Neurological:      General: No focal deficit present.      Mental Status: She is alert.   Psychiatric:         Mood and Affect: Mood normal.         Assessment/Plan:     Diagnosis and associated orders:     1. Other fatigue  TSH WITH REFLEX TO FT4         Comments/MDM:     Upon still exam patient is alert no " apparent signs of distress.  Neck is supple, no lymphadenopathy.  She is clear to auscultation.  Normal respiratory effort.  Abdomen is flat, soft and nondistended.  Overall reassuring physical exam.  Vital signs are stable in clinic.  Patient does endorse a family history of hypothyroidism.  Will send for TSH with reflex to evaluate.  Advised patient to follow-up with PCP.  Red flag signs and symptoms discussed.  Instructed to return to ER or urgent care if symptoms worsen or fail to improve.         Differential diagnosis, natural history, supportive care, and indications for immediate follow-up discussed.    Advised the patient to follow-up with the primary care physician for recheck, reevaluation, and consideration of further management.    Please note that this dictation was created using voice recognition software. I have made a reasonable attempt to correct obvious errors, but I expect that there are errors of grammar and possibly content that I did not discover before finalizing the note.    This note was electronically signed by NIMA Castro

## 2024-03-15 ENCOUNTER — HOSPITAL ENCOUNTER (OUTPATIENT)
Dept: LAB | Facility: MEDICAL CENTER | Age: 54
End: 2024-03-15
Attending: FAMILY MEDICINE
Payer: COMMERCIAL

## 2024-03-15 ENCOUNTER — OFFICE VISIT (OUTPATIENT)
Dept: MEDICAL GROUP | Facility: PHYSICIAN GROUP | Age: 54
End: 2024-03-15
Payer: COMMERCIAL

## 2024-03-15 VITALS
BODY MASS INDEX: 30.66 KG/M2 | SYSTOLIC BLOOD PRESSURE: 110 MMHG | OXYGEN SATURATION: 96 % | WEIGHT: 166.6 LBS | HEART RATE: 96 BPM | DIASTOLIC BLOOD PRESSURE: 74 MMHG | HEIGHT: 62 IN | TEMPERATURE: 98.6 F

## 2024-03-15 DIAGNOSIS — F33.2 SEVERE EPISODE OF RECURRENT MAJOR DEPRESSIVE DISORDER, WITHOUT PSYCHOTIC FEATURES (HCC): ICD-10-CM

## 2024-03-15 DIAGNOSIS — R53.83 OTHER FATIGUE: Primary | ICD-10-CM

## 2024-03-15 DIAGNOSIS — R53.83 OTHER FATIGUE: ICD-10-CM

## 2024-03-15 LAB
ALBUMIN SERPL BCP-MCNC: 4.6 G/DL (ref 3.2–4.9)
ALBUMIN/GLOB SERPL: 1.6 G/DL
ALP SERPL-CCNC: 48 U/L (ref 30–99)
ALT SERPL-CCNC: 22 U/L (ref 2–50)
ANION GAP SERPL CALC-SCNC: 16 MMOL/L (ref 7–16)
AST SERPL-CCNC: 23 U/L (ref 12–45)
BILIRUB SERPL-MCNC: 0.2 MG/DL (ref 0.1–1.5)
BUN SERPL-MCNC: 21 MG/DL (ref 8–22)
CALCIUM ALBUM COR SERPL-MCNC: 9.7 MG/DL (ref 8.5–10.5)
CALCIUM SERPL-MCNC: 10.2 MG/DL (ref 8.5–10.5)
CHLORIDE SERPL-SCNC: 102 MMOL/L (ref 96–112)
CO2 SERPL-SCNC: 24 MMOL/L (ref 20–33)
CREAT SERPL-MCNC: 0.72 MG/DL (ref 0.5–1.4)
ERYTHROCYTE [DISTWIDTH] IN BLOOD BY AUTOMATED COUNT: 46.3 FL (ref 35.9–50)
FASTING STATUS PATIENT QL REPORTED: NORMAL
GFR SERPLBLD CREATININE-BSD FMLA CKD-EPI: 99 ML/MIN/1.73 M 2
GLOBULIN SER CALC-MCNC: 2.9 G/DL (ref 1.9–3.5)
GLUCOSE SERPL-MCNC: 144 MG/DL (ref 65–99)
HCT VFR BLD AUTO: 50.8 % (ref 37–47)
HGB BLD-MCNC: 16.8 G/DL (ref 12–16)
MCH RBC QN AUTO: 29.8 PG (ref 27–33)
MCHC RBC AUTO-ENTMCNC: 33.1 G/DL (ref 32.2–35.5)
MCV RBC AUTO: 90.1 FL (ref 81.4–97.8)
PLATELET # BLD AUTO: 244 K/UL (ref 164–446)
PMV BLD AUTO: 11.3 FL (ref 9–12.9)
POTASSIUM SERPL-SCNC: 4.3 MMOL/L (ref 3.6–5.5)
PROT SERPL-MCNC: 7.5 G/DL (ref 6–8.2)
RBC # BLD AUTO: 5.64 M/UL (ref 4.2–5.4)
SODIUM SERPL-SCNC: 142 MMOL/L (ref 135–145)
WBC # BLD AUTO: 6 K/UL (ref 4.8–10.8)

## 2024-03-15 PROCEDURE — 80053 COMPREHEN METABOLIC PANEL: CPT

## 2024-03-15 PROCEDURE — 85027 COMPLETE CBC AUTOMATED: CPT

## 2024-03-15 PROCEDURE — 36415 COLL VENOUS BLD VENIPUNCTURE: CPT

## 2024-03-15 PROCEDURE — 3078F DIAST BP <80 MM HG: CPT | Performed by: FAMILY MEDICINE

## 2024-03-15 PROCEDURE — 99214 OFFICE O/P EST MOD 30 MIN: CPT | Performed by: FAMILY MEDICINE

## 2024-03-15 PROCEDURE — 3074F SYST BP LT 130 MM HG: CPT | Performed by: FAMILY MEDICINE

## 2024-03-15 RX ORDER — FLUOXETINE HYDROCHLORIDE 20 MG/1
20 CAPSULE ORAL DAILY
Qty: 90 CAPSULE | Refills: 0 | Status: SHIPPED | OUTPATIENT
Start: 2024-03-15

## 2024-03-15 ASSESSMENT — PATIENT HEALTH QUESTIONNAIRE - PHQ9
CLINICAL INTERPRETATION OF PHQ2 SCORE: 5
SUM OF ALL RESPONSES TO PHQ QUESTIONS 1-9: 20
5. POOR APPETITE OR OVEREATING: 3 - NEARLY EVERY DAY

## 2024-03-15 NOTE — LETTER
[address]  [facility]  [address]  [address]  [place] [address]     March 15, 2024    Patient: Sanna Yuen   YOB: 1970   Date of Visit: 3/15/2024       To Whom It May Concern:    Sanna Yuen was seen and treated in our department on 3/15/2024. Please excuse her from work on 3/16/2024 and 3/17/2024 while she recovers.     Sincerely,     Niharika Santacruz M.D.

## 2024-03-15 NOTE — PROGRESS NOTES
Verbal consent was acquired by the patient to use [employer] ambient listening note generation during this visit     Subjective:     HPI:   History of Present Illness  The patient presents for evaluation of multiple medical concerns.    She works at the [employer] and from Wednesday through Saturday, she was falling asleep on her feet. She called in Sunday and Monday and then Tuesday and Wednesday were her given days off. Her  took her to [facility] on Wednesday and they ordered some lab work. Her mother took her to get her lab work done yesterday. Urgent care gave her a note for Thursday. She is not herself, has no energy and is sleeping all the time and it is starting to worry her. Her sores are taking forever to heal and her skin is dry. She has had diarrhea for at least the last 3 months. Her doctor gave her a test in the mail, but she has not been able to do it because of the diarrhea. She gets cold, but when she puts a blanket on, she gets hot. She has lost weight. She gets hungry, but she eats a couple of bites and then she is done. She sometimes feels nauseous, but she has not vomited. The nausea started at the same time as the fatigue. She denies sore throat, chest pain, or trouble breathing. She does not snore when she sleeps. No one has told her that she pauses her breathing while she sleeps. She is on high blood pressure medications. She used to wake up at 1:30 AM, leave the house at 3:00 AM, and get off anywhere between 10:30 and 11:30 AM. Her mouth has been dry since she started falling asleep on her feet. She denies morning headaches.    She has had depression for a long time. Her kids were little and she was going through some stuff. She has been on medication for depression in the past.   She has a family history of thyroid issues.    Depression Screening    Little interest or pleasure in doing things?  3 - nearly every day   Feeling down, depressed , or hopeless? 2 - more  "than half the days   Trouble falling or staying asleep, or sleeping too much?  3 - nearly every day   Feeling tired or having little energy?  3 - nearly every day   Poor appetite or overeating?  3 - nearly every day   Feeling bad about yourself - or that you are a failure or have let yourself or your family down? 2 - more than half the days   Trouble concentrating on things, such as reading the newspaper or watching television? 3 - nearly every day   Moving or speaking so slowly that other people could have noticed.  Or the opposite - being so fidgety or restless that you have been moving around a lot more than usual?  1 - several days   Thoughts that you would be better off dead, or of hurting yourself?  0 - not at all   Patient Health Questionnaire Score: 20       If depressive symptoms identified deferred to follow up visit unless specifically addressed in assesment and plan.    Interpretation of PHQ-9 Total Score   Score Severity   1-4 No Depression   5-9 Mild Depression   10-14 Moderate Depression   15-19 Moderately Severe Depression   20-27 Severe Depression      Health Maintenance: Completed    Objective:     Exam:  /74 (BP Location: Left arm, Patient Position: Sitting, BP Cuff Size: Adult)   Pulse 96   Temp 37 °C (98.6 °F) (Temporal)   Ht 1.575 m (5' 2\")   Wt 75.6 kg (166 lb 9.6 oz)   LMP  (LMP Unknown)   SpO2 96%   BMI 30.47 kg/m²  Body mass index is 30.47 kg/m².    Physical Exam  Constitutional:       Appearance: Normal appearance.   Cardiovascular:      Rate and Rhythm: Normal rate and regular rhythm.      Heart sounds: Normal heart sounds.   Pulmonary:      Effort: Pulmonary effort is normal.      Breath sounds: Normal breath sounds.   Musculoskeletal:      Cervical back: Normal range of motion and neck supple.   Lymphadenopathy:      Cervical: No cervical adenopathy.   Neurological:      Mental Status: She is alert.             Results      STOPBAN - Sleep Apnea Screening      Flowsheet " Row Most Recent Value   S - Have you been told that you SNORE? No   T - Are you often TIRED during the day? Yes   O - Do you know if you stop breathing or has anyone witnessed you stop breathing while you were asleep? (OBSTRUCTION) No   P - Do you have high blood PRESSURE or on medication to control high blood pressure? Yes   B - Is your Body Mass Index greater than 35? (BMI) No   A - Are you 50 years old or older? (AGE) Yes   N - Are you a male with a NECK circumference greater than 17 inches, or a female with a neck circumference greater than 16 inches? No   G - Are you male? (GENDER) No   STOPBANG Total Score 3   GURMEET Risk Intermediate Risk              Assessment & Plan:     1. Other fatigue  CBC WITHOUT DIFFERENTIAL    Comp Metabolic Panel      2. Severe episode of recurrent major depressive disorder, without psychotic features (HCC)            Assessment & Plan  1. Fatigue.  This is acute for about a week and a half. She has been feeling extremely fatigued and overall just not feeling like herself and  unable to perform tasks and she has had to miss work because of the symptoms. She saw urgent care a couple of days ago and they checked a TSH, which was normal. I also obtained a STOP-Bang today and she scored 3, which is intermediate risk, so this could also be contributing to her fatigue. I will go ahead and check other basic lab work for fatigue including a CBC and CMP. I have recommended that she discuss with her primary care provider that if her labs are normal to consider a sleep study in the future. I did write a note for work for the next 2 days.     2. Major depression.  This is chronic and uncontrolled. She does have a history of depression many years ago and it does seem it has recurred in the last few weeks. PHQ-9 score today was 20 indicating severe depression. This could also be contributing to her fatigue. I will start her on fluoxetine 20 mg daily and she will follow up with her PCP  closely.    Follow-up  She will follow up with her PCP as scheduled on 04/02/2025.      Please note that this dictation was created using voice recognition software. I have made every reasonable attempt to correct obvious errors, but I expect that there are errors of grammar and possibly content that I did not discover before finalizing the note.

## 2024-03-19 ASSESSMENT — ENCOUNTER SYMPTOMS
CHILLS: 0
FEVER: 0
NAUSEA: 1

## 2024-04-02 ENCOUNTER — APPOINTMENT (OUTPATIENT)
Dept: MEDICAL GROUP | Facility: PHYSICIAN GROUP | Age: 54
End: 2024-04-02
Payer: COMMERCIAL

## 2024-04-02 VITALS
HEIGHT: 62 IN | OXYGEN SATURATION: 97 % | TEMPERATURE: 97.6 F | SYSTOLIC BLOOD PRESSURE: 108 MMHG | WEIGHT: 166 LBS | DIASTOLIC BLOOD PRESSURE: 64 MMHG | HEART RATE: 83 BPM | BODY MASS INDEX: 30.55 KG/M2

## 2024-04-02 DIAGNOSIS — E11.65 TYPE 2 DIABETES MELLITUS WITH HYPERGLYCEMIA, WITHOUT LONG-TERM CURRENT USE OF INSULIN (HCC): Chronic | ICD-10-CM

## 2024-04-02 DIAGNOSIS — F33.8 OTHER RECURRENT DEPRESSIVE DISORDERS (HCC): ICD-10-CM

## 2024-04-02 DIAGNOSIS — R53.83 OTHER FATIGUE: ICD-10-CM

## 2024-04-02 DIAGNOSIS — D75.1 POLYCYTHEMIA: ICD-10-CM

## 2024-04-02 PROCEDURE — 99214 OFFICE O/P EST MOD 30 MIN: CPT | Performed by: STUDENT IN AN ORGANIZED HEALTH CARE EDUCATION/TRAINING PROGRAM

## 2024-04-02 PROCEDURE — 3074F SYST BP LT 130 MM HG: CPT | Performed by: STUDENT IN AN ORGANIZED HEALTH CARE EDUCATION/TRAINING PROGRAM

## 2024-04-02 PROCEDURE — 3078F DIAST BP <80 MM HG: CPT | Performed by: STUDENT IN AN ORGANIZED HEALTH CARE EDUCATION/TRAINING PROGRAM

## 2024-04-02 RX ORDER — BUPROPION HYDROCHLORIDE 150 MG/1
150 TABLET, EXTENDED RELEASE ORAL DAILY
Qty: 30 TABLET | Refills: 0 | Status: SHIPPED | OUTPATIENT
Start: 2024-04-02 | End: 2024-04-29

## 2024-04-02 RX ORDER — FERROUS SULFATE 325(65) MG
325 TABLET ORAL DAILY
COMMUNITY

## 2024-04-02 ASSESSMENT — ENCOUNTER SYMPTOMS
VOMITING: 0
SHORTNESS OF BREATH: 0
HEADACHES: 0
CONSTIPATION: 0
DIZZINESS: 0
INSOMNIA: 0
WHEEZING: 0
WEIGHT LOSS: 1
COUGH: 0
ABDOMINAL PAIN: 0
DEPRESSION: 1
NERVOUS/ANXIOUS: 1
NAUSEA: 0
FEVER: 0
PALPITATIONS: 0
DIARRHEA: 0
CHILLS: 0

## 2024-04-02 ASSESSMENT — FIBROSIS 4 INDEX: FIB4 SCORE: 1.09

## 2024-04-02 NOTE — LETTER
April 2, 2024        Sanna Yuen is established with our clinic. She was seen 4/2/24. Please allow her to have water nearby at work to prevent dehydration.                                Cindy Galloway P.A.-C.

## 2024-04-02 NOTE — PROGRESS NOTES
Subjective:     CC:   Chief Complaint   Patient presents with    Follow-Up     Urgent care visit     Medication Refill       HPI:   Sanna presents today with    Fatigue.  Patient has had symptoms for about a month now.  Initially, she had several days where she was sleeping for much of the day, and she needed to miss work.  This has been improving and she has been able to go back to work.  However, she reports persistent daytime fatigue.  States that sometimes it feels like she is falling asleep while she is standing.  She does not think she has been getting enough sleep.  She works at 3:30 AM and has to wake up at 1 AM.  She tries to go to sleep around 6 PM but has not been able to get to sleep that early.  Once she does go to bed however she does not have a hard time falling asleep or staying asleep.  She also wonders if she is dehydrated because she is not able to take frequent breaks to drink water at work.     She was seen for this a few weeks ago had labs done.  She was started on fluoxetine for depression because her PHQ 9 score was positive and she does have a history of depression in the past.  However, she did not continue the medication after about 5 days because it seemed to cause agitation and irritability.      Past medical, family, and social history reviewed by me in Epic chart today.   Medications and allergies reviewed in Epic chart by me today.       Health Maintenance: Completed    ROS:  Review of Systems   Constitutional:  Positive for malaise/fatigue and weight loss. Negative for chills and fever.   Respiratory:  Negative for cough, shortness of breath and wheezing.    Cardiovascular:  Negative for chest pain and palpitations.   Gastrointestinal:  Negative for abdominal pain, constipation, diarrhea, nausea and vomiting.   Neurological:  Negative for dizziness and headaches.   Psychiatric/Behavioral:  Positive for depression. Negative for suicidal ideas. The patient is nervous/anxious. The  "patient does not have insomnia.        Objective:     Exam:  /64 (BP Location: Left arm, Patient Position: Sitting, BP Cuff Size: Adult long)   Pulse 83   Temp 36.4 °C (97.6 °F) (Temporal)   Ht 1.575 m (5' 2\")   Wt 75.3 kg (166 lb)   LMP  (LMP Unknown)   SpO2 97%   BMI 30.36 kg/m²  Body mass index is 30.36 kg/m².    Physical Exam  Constitutional:       General: She is not in acute distress.  HENT:      Right Ear: External ear normal.      Left Ear: External ear normal.      Mouth/Throat:      Mouth: Mucous membranes are moist.   Eyes:      Extraocular Movements: Extraocular movements intact.      Conjunctiva/sclera: Conjunctivae normal.   Cardiovascular:      Rate and Rhythm: Normal rate and regular rhythm.      Heart sounds: No murmur heard.     No friction rub. No gallop.   Pulmonary:      Effort: Pulmonary effort is normal.      Breath sounds: No wheezing, rhonchi or rales.   Musculoskeletal:      Cervical back: Normal range of motion and neck supple.      Right lower leg: No edema.      Left lower leg: No edema.   Lymphadenopathy:      Cervical: No cervical adenopathy.   Skin:     General: Skin is warm and dry.   Neurological:      Mental Status: She is alert.   Psychiatric:         Mood and Affect: Mood normal.         Labs:     Recent lab results reviewed.  TSH within normal limits.  CMP with elevated glucose, these are nonfasting labs, otherwise normal.  CBC with new polycythemia, hemoglobin 16.8, hematocrit 50.8.      Assessment & Plan:     53 y.o. female with the following -     1. Other fatigue  This is a new problem.  Ongoing for about 1 month now.  Does seem to be improving.  Recent lab results were reviewed, as above.  Will repeat her CBC in 2 to 4 weeks.  She would also like me to check her iron studies, which I have ordered.  Fatigue could certainly be secondary to uncontrolled depression.  She had side effects with fluoxetine, so we will try an alternative antidepressant.  I have also " advised her to stay well-hydrated.  Follow-up in 1 month to review lab results and discuss depression.  If fatigue persists, consider sleep study.  - IRON/TOTAL IRON BIND; Future  - FERRITIN; Future    2. Other recurrent depressive disorders (HCC)  Chronic, uncontrolled.  She does not have suicidal ideation. She did not tolerate fluoxetine due to side effects.  Will start Wellbutrin alternatively and follow-up in 4 weeks.  - buPROPion SR (WELLBUTRIN-SR) 150 MG TABLET SR 12 HR sustained-release tablet; Take 1 Tablet by mouth every day.  Dispense: 30 Tablet; Refill: 0    3. Polycythemia  Repeat labs in 2 to 4 weeks.  Make sure you are hydrated.  - CBC WITH DIFFERENTIAL; Future    4. Type 2 diabetes mellitus with hyperglycemia, without long-term current use of insulin (HCC)  - HEMOGLOBIN A1C; Future            Return in about 4 weeks (around 4/30/2024) for depression, labs.    Please note that this dictation was created using voice recognition software. I have made every reasonable attempt to correct obvious errors, but I expect that there are errors of grammar and possibly content that I did not discover before finalizing the note.

## 2024-04-24 ENCOUNTER — HOSPITAL ENCOUNTER (OUTPATIENT)
Dept: LAB | Facility: MEDICAL CENTER | Age: 54
End: 2024-04-24
Attending: STUDENT IN AN ORGANIZED HEALTH CARE EDUCATION/TRAINING PROGRAM
Payer: COMMERCIAL

## 2024-04-24 DIAGNOSIS — E11.65 TYPE 2 DIABETES MELLITUS WITH HYPERGLYCEMIA, WITHOUT LONG-TERM CURRENT USE OF INSULIN (HCC): Chronic | ICD-10-CM

## 2024-04-24 DIAGNOSIS — R53.83 OTHER FATIGUE: ICD-10-CM

## 2024-04-24 DIAGNOSIS — D75.1 POLYCYTHEMIA: ICD-10-CM

## 2024-04-24 LAB
BASOPHILS # BLD AUTO: 1.1 % (ref 0–1.8)
BASOPHILS # BLD: 0.06 K/UL (ref 0–0.12)
EOSINOPHIL # BLD AUTO: 0.13 K/UL (ref 0–0.51)
EOSINOPHIL NFR BLD: 2.3 % (ref 0–6.9)
ERYTHROCYTE [DISTWIDTH] IN BLOOD BY AUTOMATED COUNT: 54.1 FL (ref 35.9–50)
EST. AVERAGE GLUCOSE BLD GHB EST-MCNC: 163 MG/DL
FERRITIN SERPL-MCNC: 73 NG/ML (ref 10–291)
HBA1C MFR BLD: 7.3 % (ref 4–5.6)
HCT VFR BLD AUTO: 45.6 % (ref 37–47)
HGB BLD-MCNC: 14.3 G/DL (ref 12–16)
IMM GRANULOCYTES # BLD AUTO: 0.05 K/UL (ref 0–0.11)
IMM GRANULOCYTES NFR BLD AUTO: 0.9 % (ref 0–0.9)
IRON SATN MFR SERPL: 15 % (ref 15–55)
IRON SERPL-MCNC: 69 UG/DL (ref 40–170)
LYMPHOCYTES # BLD AUTO: 1.44 K/UL (ref 1–4.8)
LYMPHOCYTES NFR BLD: 25.4 % (ref 22–41)
MCH RBC QN AUTO: 30.2 PG (ref 27–33)
MCHC RBC AUTO-ENTMCNC: 31.4 G/DL (ref 32.2–35.5)
MCV RBC AUTO: 96.4 FL (ref 81.4–97.8)
MONOCYTES # BLD AUTO: 0.46 K/UL (ref 0–0.85)
MONOCYTES NFR BLD AUTO: 8.1 % (ref 0–13.4)
NEUTROPHILS # BLD AUTO: 3.54 K/UL (ref 1.82–7.42)
NEUTROPHILS NFR BLD: 62.2 % (ref 44–72)
NRBC # BLD AUTO: 0 K/UL
NRBC BLD-RTO: 0 /100 WBC (ref 0–0.2)
PLATELET # BLD AUTO: 238 K/UL (ref 164–446)
PMV BLD AUTO: 11.3 FL (ref 9–12.9)
RBC # BLD AUTO: 4.73 M/UL (ref 4.2–5.4)
TIBC SERPL-MCNC: 462 UG/DL (ref 250–450)
UIBC SERPL-MCNC: 393 UG/DL (ref 110–370)
WBC # BLD AUTO: 5.7 K/UL (ref 4.8–10.8)

## 2024-04-24 PROCEDURE — 36415 COLL VENOUS BLD VENIPUNCTURE: CPT

## 2024-04-24 PROCEDURE — 85025 COMPLETE CBC W/AUTO DIFF WBC: CPT

## 2024-04-24 PROCEDURE — 82728 ASSAY OF FERRITIN: CPT

## 2024-04-24 PROCEDURE — 83540 ASSAY OF IRON: CPT

## 2024-04-24 PROCEDURE — 83036 HEMOGLOBIN GLYCOSYLATED A1C: CPT

## 2024-04-24 PROCEDURE — 83550 IRON BINDING TEST: CPT

## 2024-04-25 ENCOUNTER — TELEPHONE (OUTPATIENT)
Dept: PHARMACY | Facility: MEDICAL CENTER | Age: 54
End: 2024-04-25
Payer: COMMERCIAL

## 2024-04-25 DIAGNOSIS — M25.531 RIGHT WRIST PAIN: ICD-10-CM

## 2024-04-25 DIAGNOSIS — M25.561 ACUTE PAIN OF RIGHT KNEE: ICD-10-CM

## 2024-04-25 RX ORDER — CELECOXIB 200 MG/1
CAPSULE ORAL
Qty: 60 CAPSULE | Refills: 0 | Status: SHIPPED | OUTPATIENT
Start: 2024-04-25

## 2024-04-25 NOTE — TELEPHONE ENCOUNTER
Semaglutide,0.25 or 0.5MG/DOS, (OZEMPIC, 0.25 OR 0.5 MG/DOSE,) 2 MG/3ML Solution Pen     Received Renewal PA request via Fax CMM (Key:[id number])    for Ozempic. (Quantity:3, Day Supply:28)     Insurance: Ava  Member ID:  [id number]  BIN: [id number]  PCN: [facility]  Group:      Ran Test claim via [facility] & medication  [facility]WORK PHARM[facility]    Will continue PA in CMM (Key: [id number])     Prior Authorization for Ozempic has been approved for a quantity of 3 , day supply 28    Prior Authorization reference number: [id number]  Insurance: Ava  Effective dates:  04/25/2024 to 04/24/2025.  Copay: $N/A       Is patient eligible to fill with [facility]? No, test claim rejected stating OUT-OF-NETWORK PHARMACY/ lock out.

## 2024-04-25 NOTE — TELEPHONE ENCOUNTER
Received request via: Pharmacy    Was the patient seen in the last year in this department? Yes    Does the patient have an active prescription (recently filled or refills available) for medication(s) requested? No    Pharmacy Name: [facility]    Does the patient have [facility] Plus and need 100 day supply (blood pressure, diabetes and cholesterol meds only)? Patient does not have [facility]    Future Appointments         Provider Department Center    4/30/2024 4:00 PM (Arrive by 3:45 PM) Cindy M. Galloway, P.A.-C. [facility] - [address]    5/20/2024 1:30 PM (Arrive by 1:15 PM) JAY PHARMACIST [facility] [address]    6/11/2024 11:20 AM (Arrive by 11:05 AM) Cindy M. Galloway, P.A.-C. [facility] - [address]

## 2024-04-29 DIAGNOSIS — F33.8 OTHER RECURRENT DEPRESSIVE DISORDERS (HCC): ICD-10-CM

## 2024-04-29 RX ORDER — BUPROPION HYDROCHLORIDE 150 MG/1
150 TABLET, EXTENDED RELEASE ORAL DAILY
Qty: 30 TABLET | Refills: 0 | Status: SHIPPED | OUTPATIENT
Start: 2024-04-29 | End: 2024-04-30 | Stop reason: SDUPTHER

## 2024-04-29 NOTE — TELEPHONE ENCOUNTER
Received request via: Pharmacy    Was the patient seen in the last year in this department? Yes    Does the patient have an active prescription (recently filled or refills available) for medication(s) requested? No    Pharmacy Name:     Does the patient have group home Plus and need 100 day supply (blood pressure, diabetes and cholesterol meds only)? Patient does not have [facility]

## 2024-04-30 ENCOUNTER — OFFICE VISIT (OUTPATIENT)
Dept: MEDICAL GROUP | Facility: PHYSICIAN GROUP | Age: 54
End: 2024-04-30
Payer: COMMERCIAL

## 2024-04-30 VITALS
SYSTOLIC BLOOD PRESSURE: 108 MMHG | HEIGHT: 62 IN | HEART RATE: 76 BPM | DIASTOLIC BLOOD PRESSURE: 68 MMHG | BODY MASS INDEX: 31.83 KG/M2 | TEMPERATURE: 97.8 F | OXYGEN SATURATION: 98 % | WEIGHT: 173 LBS

## 2024-04-30 DIAGNOSIS — E11.65 TYPE 2 DIABETES MELLITUS WITH HYPERGLYCEMIA, WITHOUT LONG-TERM CURRENT USE OF INSULIN (HCC): Chronic | ICD-10-CM

## 2024-04-30 DIAGNOSIS — E78.2 MIXED DYSLIPIDEMIA: ICD-10-CM

## 2024-04-30 DIAGNOSIS — F33.8 OTHER RECURRENT DEPRESSIVE DISORDERS (HCC): ICD-10-CM

## 2024-04-30 DIAGNOSIS — I10 ESSENTIAL HYPERTENSION: Chronic | ICD-10-CM

## 2024-04-30 RX ORDER — ATENOLOL 50 MG/1
50 TABLET ORAL DAILY
Qty: 90 TABLET | Refills: 3 | Status: SHIPPED | OUTPATIENT
Start: 2024-04-30

## 2024-04-30 RX ORDER — PIOGLITAZONEHYDROCHLORIDE 30 MG/1
30 TABLET ORAL DAILY
Qty: 90 TABLET | Refills: 3 | Status: SHIPPED | OUTPATIENT
Start: 2024-04-30

## 2024-04-30 RX ORDER — BUPROPION HYDROCHLORIDE 150 MG/1
150 TABLET, EXTENDED RELEASE ORAL DAILY
Qty: 90 TABLET | Refills: 0 | Status: SHIPPED | OUTPATIENT
Start: 2024-04-30

## 2024-04-30 RX ORDER — EMPAGLIFLOZIN 25 MG/1
25 TABLET, FILM COATED ORAL DAILY
Qty: 90 TABLET | Refills: 3 | Status: SHIPPED | OUTPATIENT
Start: 2024-04-30

## 2024-04-30 RX ORDER — AMLODIPINE BESYLATE 10 MG/1
10 TABLET ORAL DAILY
Qty: 90 TABLET | Refills: 3 | Status: SHIPPED | OUTPATIENT
Start: 2024-04-30

## 2024-04-30 RX ORDER — SEMAGLUTIDE 1.34 MG/ML
1 INJECTION, SOLUTION SUBCUTANEOUS
Qty: 9 ML | Refills: 0 | Status: SHIPPED | OUTPATIENT
Start: 2024-04-30

## 2024-04-30 RX ORDER — FENOFIBRATE 145 MG/1
145 TABLET, COATED ORAL DAILY
Qty: 90 TABLET | Refills: 3 | Status: SHIPPED | OUTPATIENT
Start: 2024-04-30

## 2024-04-30 RX ORDER — LOSARTAN POTASSIUM 25 MG/1
25 TABLET ORAL DAILY
Qty: 90 TABLET | Refills: 3 | Status: SHIPPED | OUTPATIENT
Start: 2024-04-30

## 2024-04-30 ASSESSMENT — ENCOUNTER SYMPTOMS
CHILLS: 0
FEVER: 0
DIZZINESS: 0
HEADACHES: 0
SHORTNESS OF BREATH: 0

## 2024-04-30 ASSESSMENT — FIBROSIS 4 INDEX: FIB4 SCORE: 1.11

## 2024-04-30 NOTE — PROGRESS NOTES
"Verbal consent was acquired by the patient to use Nutzvieh[employer] ambient listening note generation during this visit.    Subjective:     HPI:   History of Present Illness  The patient presents for follow-up on depression.    The patient was initiated on Wellbutrin during her last visit for depression. She has reported a significant improvement in her symptoms, including reduced stress levels and reduced fatigue. She states she is starting to feel more like herself again.  She would like to continue the Wellbutrin.    She also needs several medication refills today.    She is taking losartan 25 mg daily, amlodipine 10 mg daily, and atenolol 50 mg daily for blood pressure control.  Blood pressure is 100 8/68 today.    She is taking rosuvastatin 20 mg nightly and Tricor 145 mg daily for management of mixed dyslipidemia.  Her last lipid panel in December of last year was within normal limits.  There has not been any elevations in her LFTs.  She denies any side effects of the medications.    The patient follows with clinical pharmacist for management of diabetes.  She is on metformin 1000 mg twice daily, Actos 30 mg daily, Jardiance 25 mg daily, and Ozempic 0.5 mg daily.  Last A1c 7.3%.  She wonders if she can go up on the dose of her Ozempic.        Health Maintenance: Completed    ROS:  Review of Systems   Constitutional:  Negative for chills and fever.   Respiratory:  Negative for shortness of breath.    Cardiovascular:  Negative for chest pain.   Neurological:  Negative for dizziness and headaches.       Objective:     Exam:  /68 (BP Location: Left arm, Patient Position: Sitting, BP Cuff Size: Adult long)   Pulse 76   Temp 36.6 °C (97.8 °F) (Temporal)   Ht 1.575 m (5' 2\")   Wt 78.5 kg (173 lb)   LMP  (LMP Unknown)   SpO2 98%   BMI 31.64 kg/m²  Body mass index is 31.64 kg/m².    Physical Exam  Constitutional:       General: She is not in acute distress.  HENT:      Mouth/Throat:      Mouth: Mucous membranes " are moist.   Eyes:      Extraocular Movements: Extraocular movements intact.   Pulmonary:      Effort: Pulmonary effort is normal. No respiratory distress.   Musculoskeletal:      Cervical back: Neck supple.   Skin:     Comments: No visible rashes   Neurological:      Mental Status: She is alert.   Psychiatric:         Mood and Affect: Mood normal.           Results  Laboratory Studies  Results for orders placed or performed during the hospital encounter of 04/24/24   FERRITIN   Result Value Ref Range    Ferritin 73.0 10.0 - 291.0 ng/mL   IRON/TOTAL IRON BIND   Result Value Ref Range    Iron 69 40 - 170 ug/dL    Total Iron Binding 462 (H) 250 - 450 ug/dL    Unsat Iron Binding 393 (H) 110 - 370 ug/dL    % Saturation 15 15 - 55 %   HEMOGLOBIN A1C   Result Value Ref Range    Glycohemoglobin 7.3 (H) 4.0 - 5.6 %    Est Avg Glucose 163 mg/dL   CBC WITH DIFFERENTIAL   Result Value Ref Range    WBC 5.7 4.8 - 10.8 K/uL    RBC 4.73 4.20 - 5.40 M/uL    Hemoglobin 14.3 12.0 - 16.0 g/dL    Hematocrit 45.6 37.0 - 47.0 %    MCV 96.4 81.4 - 97.8 fL    MCH 30.2 27.0 - 33.0 pg    MCHC 31.4 (L) 32.2 - 35.5 g/dL    RDW 54.1 (H) 35.9 - 50.0 fL    Platelet Count 238 164 - 446 K/uL    MPV 11.3 9.0 - 12.9 fL    Neutrophils-Polys 62.20 44.00 - 72.00 %    Lymphocytes 25.40 22.00 - 41.00 %    Monocytes 8.10 0.00 - 13.40 %    Eosinophils 2.30 0.00 - 6.90 %    Basophils 1.10 0.00 - 1.80 %    Immature Granulocytes 0.90 0.00 - 0.90 %    Nucleated RBC 0.00 0.00 - 0.20 /100 WBC    Neutrophils (Absolute) 3.54 1.82 - 7.42 K/uL    Lymphs (Absolute) 1.44 1.00 - 4.80 K/uL    Monos (Absolute) 0.46 0.00 - 0.85 K/uL    Eos (Absolute) 0.13 0.00 - 0.51 K/uL    Baso (Absolute) 0.06 0.00 - 0.12 K/uL    Immature Granulocytes (abs) 0.05 0.00 - 0.11 K/uL    NRBC (Absolute) 0.00 K/uL         Assessment & Plan:     1. Other recurrent depressive disorders (HCC)  buPROPion SR (WELLBUTRIN-SR) 150 MG TABLET SR 12 HR sustained-release tablet      2. Type 2 diabetes  mellitus with hyperglycemia, without long-term current use of insulin (HCC)  Semaglutide, 1 MG/DOSE, (OZEMPIC, 1 MG/DOSE,) 4 MG/3ML Solution Pen-injector    pioglitazone (ACTOS) 30 MG Tab    metformin (GLUCOPHAGE) 1000 MG tablet      3. Mixed dyslipidemia  fenofibrate (TRICOR) 145 MG Tab      4. Essential hypertension  atenolol (TENORMIN) 50 MG Tab    losartan (COZAAR) 25 MG Tab    Empagliflozin (JARDIANCE) 25 MG Tab    amLODIPine (NORVASC) 10 MG Tab          Assessment & Plan  1. Depression.  The patient's depression is well-managed with Wellbutrin.  Continue Wellbutrin  mg daily.  Refill provided today.    2. Diabetes Mellitus.  Chronic, stable.  Will increase dose of Ozempic to 1 mg weekly.  Refills for Actos, Jardiance, metformin have been provided.  She will continue to follow with the clinical pharmacist for management.    3. Hypertension.  The patient's blood pressure is well-regulated. The patient will maintain her current antihypertensive regimen.  Refills of losartan, amlodipine, atenolol provided.    4. Hypercholesterolemia.  Chronic, controlled.  The patient's Crestor prescription has been refilled. Her TriCor prescription has been refilled.    5. Smoking cessation.  Smoking cessation was discussed with the patient.  She has been able to cut back since starting Wellbutrin.    Follow-up  The patient is scheduled for a follow-up visit in 3 months.      Please note that this dictation was created using voice recognition software. I have made every reasonable attempt to correct obvious errors, but I expect that there are errors of grammar and possibly content that I did not discover before finalizing the note.

## 2024-05-20 ENCOUNTER — NON-PROVIDER VISIT (OUTPATIENT)
Dept: MEDICAL GROUP | Facility: PHYSICIAN GROUP | Age: 54
End: 2024-05-20
Payer: COMMERCIAL

## 2024-05-20 VITALS
WEIGHT: 172 LBS | DIASTOLIC BLOOD PRESSURE: 77 MMHG | BODY MASS INDEX: 31.65 KG/M2 | HEIGHT: 62 IN | HEART RATE: 81 BPM | SYSTOLIC BLOOD PRESSURE: 122 MMHG

## 2024-05-20 PROCEDURE — 99401 PREV MED CNSL INDIV APPRX 15: CPT | Performed by: STUDENT IN AN ORGANIZED HEALTH CARE EDUCATION/TRAINING PROGRAM

## 2024-05-20 ASSESSMENT — FIBROSIS 4 INDEX: FIB4 SCORE: 1.11

## 2024-05-20 NOTE — PROGRESS NOTES
"Patient Consult Note     TIME IN: 1331  TIME OUT: 1357      Primary care physician: Cindy Galloway P.A.-C.    Reason for consult: Management of Uncontrolled Type 2 Diabetes    HPI:  Sanna Yuen is a 53 y.o. old patient who comes in today for evaluation of above stated problem.    Allergies:  Lipitor [atorvastatin], Niacin, and Penicillins    Physical Examination:  Vital signs: /77   Pulse 81   Ht 1.575 m (5' 2\")   Wt 78 kg (172 lb)   LMP  (LMP Unknown)   BMI 31.46 kg/m²  Body mass index is 31.46 kg/m².    Most Recent labs, A1c, and immunizations:    No results found for: \"INR\"  No results found for: \"POCINR\"   Lab Results   Component Value Date/Time    HBA1C 7.3 (H) 04/24/2024 06:22 AM      Lab Results   Component Value Date/Time    CHOLSTRLTOT 122 12/07/2023 06:17 AM    LDL 57 12/07/2023 06:17 AM    HDL 48 12/07/2023 06:17 AM    TRIGLYCERIDE 86 12/07/2023 06:17 AM       Lab Results   Component Value Date/Time    SODIUM 142 03/15/2024 01:57 PM    POTASSIUM 4.3 03/15/2024 01:57 PM    CHLORIDE 102 03/15/2024 01:57 PM    CO2 24 03/15/2024 01:57 PM    GLUCOSE 144 (H) 03/15/2024 01:57 PM    BUN 21 03/15/2024 01:57 PM    CREATININE 0.72 03/15/2024 01:57 PM     Lab Results   Component Value Date/Time    ALKPHOSPHAT 48 03/15/2024 01:57 PM    ASTSGOT 23 03/15/2024 01:57 PM    ALTSGPT 22 03/15/2024 01:57 PM    TBILIRUBIN 0.2 03/15/2024 01:57 PM    ALBUMIN 4.6 03/15/2024 01:57 PM      Lab Results   Component Value Date/Time    RBC 4.73 04/24/2024 06:22 AM    HEMOGLOBIN 14.3 04/24/2024 06:22 AM    HEMATOCRIT 45.6 04/24/2024 06:22 AM    MCV 96.4 04/24/2024 06:22 AM    MCH 30.2 04/24/2024 06:22 AM    MCHC 31.4 (L) 04/24/2024 06:22 AM    MPV 11.3 04/24/2024 06:22 AM      Lab Results   Component Value Date/Time    MALBCRT 19 12/07/2023 06:17 AM    MICROALBUR 1.4 12/07/2023 06:17 AM     Lab Results   Component Value Date/Time    TSHULTRASEN 1.820 03/14/2024 1235       Immunization History   Administered " Date(s) Administered    Hepatitis B Vaccine (Adol/Adult) 09/27/2023    Hepatitis B Vaccine, CpG Adjuvanted (Heplisav-B) 04/11/2023, 04/25/2023    Influenza (IM) Preservative Free - HISTORICAL DATA 09/18/2011, 10/06/2014, 10/26/2015    Influenza Seasonal Injectable - Historical Data 12/09/2012, 10/28/2016    Influenza Vac Subunit Quad Inj (Pf) 10/26/2017, 10/09/2020    Influenza Vaccine Pediatric Split - Historical Data 05/22/2007, 03/11/2008, 09/15/2010    Influenza Vaccine Quad Inj (Pf) 09/28/2019, 09/16/2021, 09/02/2022, 09/27/2023    Influenza Vaccine Quad Inj (Preserved) 10/17/2018    Torri SARS-CoV-2 Vaccine 04/28/2021    MODERNA SARS-COV-2 VACCINE (12+) 12/01/2021    Pneumococcal Conjugate Vaccine (PCV20) 04/11/2023    Pneumococcal polysaccharide vaccine (PPSV-23) 09/07/2018    TD Vaccine 05/22/2007    Tdap Vaccine 09/07/2018, 04/11/2023    Zoster Vaccine Recombinant (RZV) (SHINGRIX) 11/01/2021, 01/04/2022       Medications:    Current Outpatient Medications:     Ozempic (1 MG/DOSE), 1 mg, Subcutaneous, Q7 DAYS    atenolol, 50 mg, Oral, DAILY    pioglitazone, 30 mg, Oral, DAILY    losartan, 25 mg, Oral, DAILY    Jardiance, 25 mg, Oral, DAILY    fenofibrate, 145 mg, Oral, DAILY    amLODIPine, 10 mg, Oral, DAILY    metformin, 1,000 mg, Oral, BID    buPROPion SR, 150 mg, Oral, DAILY    celecoxib, TAKE 1 CAPSULE BY MOUTH TWICE A DAY AS NEEDED FOR PAIN    ferrous sulfate, 325 mg, Oral, DAILY    rosuvastatin, 20 mg, Oral, Q EVENING    triamcinolone acetonide, 1 Application, Topical, BID    coenzyme Q-10, 60 mg, Oral, DAILY    ReliOn True Metrix Test Strips, 1 Each, Other, BID    cetirizine, 10 mg, Oral, DAILY    Multiple Vitamins-Minerals (MULTIVITAMIN ADULT PO), Take  by mouth.    Multiple Vitamins-Minerals (EYE VITAMINS PO), Take  by mouth.    Assessment and Plan:  1. DM2   REC score   1  Most recent A1c is: 7.3  Previous DM medications and outcomes and/or SEs:  Tried a MAYS and pt felt very sick.   Is Medication  cost affordable: yes  Recently started Ozempic 1 mg, will trend this response before making additional changes  Kidney function:  Appropriate for current medications  Reports increased sugar consumpiton   Discussed in detail  Pt will try to utilize sugar free lozenge instead of candy bars int he future  SMBG:  AM:120-140's  Before meals:none  PM: none  Any hypoglycemia: none  15:15 Rule discussed with patient  The 2024 ADA standards of care strongly support the use of diabetes devices for all patients with diabetes. The standards note that the type and selection of devices should be individualized based on the patient’s specific needs, preferences, and skill level, as well as the availability of devices. The guidelines  also note that CGM use should be considered at time of diagnosis in individuals who require insulin management.               Medication(s) recommended after today's visit:   Continue Ozempic 1 mg weekly (recenlty started)  CGM: Perhaps in the future    2.  Cardiovascular  Is LDL <Yes:   Is Blood pressure <130/80:  Yes    Medication(s) recommended.   Statin: No change  ACE/ARB: No change    3.  Lifestyle changes   Discussed smoking cessation, pt has reduced from 10 to 7 cigarettes/day  She would like to quit smoking this year.  Please f/u at every visit.     Follow-up appointment in 8 week(s)    Matthew Elias, PharmD, MS, BCACP, LCC  [facility]  Phone [phone] fax [phone]    This note was created using voice recognition software (Dragon). The accuracy of the dictation is limited by the abilities of the software. I have reviewed the note prior to signing, however some errors in grammar and context are still possible. If you have any questions related to this note please do not hesitate to contact our office.     CC:   Cindy Galloway P.A.-C.  Dr. Lee Sultana

## 2024-05-25 DIAGNOSIS — F33.8 OTHER RECURRENT DEPRESSIVE DISORDERS (HCC): ICD-10-CM

## 2024-05-28 RX ORDER — BUPROPION HYDROCHLORIDE 150 MG/1
150 TABLET, EXTENDED RELEASE ORAL DAILY
Qty: 90 TABLET | Refills: 1 | Status: SHIPPED | OUTPATIENT
Start: 2024-05-28

## 2024-05-28 NOTE — TELEPHONE ENCOUNTER
Received request via: Pharmacy    Was the patient seen in the last year in this department? Yes    Does the patient have an active prescription (recently filled or refills available) for medication(s) requested? No    Pharmacy Name: [facility]    [facility] the [facility] and need 100 day supply (blood pressure, diabetes and cholesterol meds only)? Patient does not have [facility]

## 2024-06-11 ENCOUNTER — OFFICE VISIT (OUTPATIENT)
Dept: MEDICAL GROUP | Facility: PHYSICIAN GROUP | Age: 54
End: 2024-06-11
Payer: COMMERCIAL

## 2024-06-11 VITALS
BODY MASS INDEX: 31.65 KG/M2 | TEMPERATURE: 97.8 F | HEART RATE: 103 BPM | OXYGEN SATURATION: 96 % | WEIGHT: 172 LBS | SYSTOLIC BLOOD PRESSURE: 110 MMHG | HEIGHT: 62 IN | DIASTOLIC BLOOD PRESSURE: 64 MMHG

## 2024-06-11 DIAGNOSIS — M79.642 BILATERAL HAND PAIN: ICD-10-CM

## 2024-06-11 DIAGNOSIS — L29.9 EAR ITCHING: ICD-10-CM

## 2024-06-11 DIAGNOSIS — M79.641 BILATERAL HAND PAIN: ICD-10-CM

## 2024-06-11 DIAGNOSIS — F32.2 CURRENT SEVERE EPISODE OF MAJOR DEPRESSIVE DISORDER WITHOUT PSYCHOTIC FEATURES WITHOUT PRIOR EPISODE (HCC): ICD-10-CM

## 2024-06-11 PROCEDURE — 3074F SYST BP LT 130 MM HG: CPT | Performed by: STUDENT IN AN ORGANIZED HEALTH CARE EDUCATION/TRAINING PROGRAM

## 2024-06-11 PROCEDURE — 99214 OFFICE O/P EST MOD 30 MIN: CPT | Performed by: STUDENT IN AN ORGANIZED HEALTH CARE EDUCATION/TRAINING PROGRAM

## 2024-06-11 PROCEDURE — 3078F DIAST BP <80 MM HG: CPT | Performed by: STUDENT IN AN ORGANIZED HEALTH CARE EDUCATION/TRAINING PROGRAM

## 2024-06-11 RX ORDER — BUPROPION HYDROCHLORIDE 150 MG/1
300 TABLET ORAL EVERY MORNING
Qty: 180 TABLET | Refills: 0 | Status: SHIPPED | OUTPATIENT
Start: 2024-06-11

## 2024-06-11 RX ORDER — FLUOCINOLONE ACETONIDE 0.11 MG/ML
OIL AURICULAR (OTIC)
Qty: 20 ML | Refills: 0 | Status: SHIPPED | OUTPATIENT
Start: 2024-06-11

## 2024-06-11 ASSESSMENT — ENCOUNTER SYMPTOMS
CHILLS: 0
FEVER: 0
SHORTNESS OF BREATH: 0
DEPRESSION: 1
HEADACHES: 0
DIZZINESS: 0

## 2024-06-11 ASSESSMENT — PATIENT HEALTH QUESTIONNAIRE - PHQ9
SUM OF ALL RESPONSES TO PHQ QUESTIONS 1-9: 22
5. POOR APPETITE OR OVEREATING: 3 - NEARLY EVERY DAY
CLINICAL INTERPRETATION OF PHQ2 SCORE: 6

## 2024-06-11 ASSESSMENT — FIBROSIS 4 INDEX: FIB4 SCORE: 1.11

## 2024-06-11 NOTE — PROGRESS NOTES
Verbal consent was acquired by the patient to use Gradient X ambient listening note generation during this visit.    Subjective:     HPI:   History of Present Illness  The patient presents for evaluation of multiple medical concerns.    Depression.  This is a chronic condition.  She is on Wellbutrin 150 mg daily.  She has previously taken Prozac but discontinued it due to side effects and perceived lack of efficacy.  She did notice significant improvement in depression symptoms after starting this medication but lately depression has been uncontrolled.  She cites significant stressors at work and at home.  She reports poor sleep quality. She denies any suicidal ideation or self-harm.    Depression Screening  Little interest or pleasure in doing things?  3 - nearly every day   Feeling down, depressed , or hopeless? 3 - nearly every day   Trouble falling or staying asleep, or sleeping too much?  2 - more than half the days   Feeling tired or having little energy?  2 - more than half the days   Poor appetite or overeating?  3 - nearly every day   Feeling bad about yourself - or that you are a failure or have let yourself or your family down? 3 - nearly every day   Trouble concentrating on things, such as reading the newspaper or watching television? 3 - nearly every day   Moving or speaking so slowly that other people could have noticed.  Or the opposite - being so fidgety or restless that you have been moving around a lot more than usual?  2 - more than half the days   Thoughts that you would be better off dead, or of hurting yourself?  1 - several days   Patient Health Questionnaire Score: 22     Interpretation of PHQ-9 Total Score   Score Severity   1-4 No Depression   5-9 Mild Depression   10-14 Moderate Depression   15-19 Moderately Severe Depression   20-27 Severe Depression      The patient also reports chronic bilateral hand pain, which has recently worsened. She describes waking up with swelling in her hands,  "often dropping items and sometimes difficulty opening jars and bottles. This has been a chronic issue for several years, but has progressively worsened.  She previously took Celebrex for this but did not find it helpful.  She experiences numbness and tingling in her fingers and hands, which typically occurs in the morning upon waking and at work. She has been diagnosed with carpal tunnel syndrome, has not undergone surgery. She has been wearing splints at night, this has not been helpful.  She has previously tried gabapentin, but discontinued it due to side effects.    She reports bilateral ear discomfort this morning.  States her ears feel itchy and swollen.  Denies any pain.  No drainage.  Hearing is unchanged.            Health Maintenance: Completed    ROS:  Review of Systems   Constitutional:  Negative for chills and fever.   Respiratory:  Negative for shortness of breath.    Cardiovascular:  Negative for chest pain.   Neurological:  Negative for dizziness and headaches.   Psychiatric/Behavioral:  Positive for depression. Negative for suicidal ideas.        Objective:     Exam:  /64 (BP Location: Left arm, Patient Position: Sitting, BP Cuff Size: Adult)   Pulse (!) 103   Temp 36.6 °C (97.8 °F) (Temporal)   Ht 1.575 m (5' 2\")   Wt 78 kg (172 lb)   LMP  (LMP Unknown)   SpO2 96%   BMI 31.46 kg/m²  Body mass index is 31.46 kg/m².    Physical Exam  Vitals reviewed.   Constitutional:       General: She is not in acute distress.  HENT:      Right Ear: Tympanic membrane, ear canal and external ear normal.      Left Ear: Tympanic membrane, ear canal and external ear normal.   Eyes:      Extraocular Movements: Extraocular movements intact.   Cardiovascular:      Rate and Rhythm: Normal rate and regular rhythm.      Heart sounds: No murmur heard.     No friction rub. No gallop.   Pulmonary:      Effort: Pulmonary effort is normal.      Breath sounds: No wheezing, rhonchi or rales.   Musculoskeletal:      " Cervical back: Neck supple.      Comments: Wrist/Hand: No deformity, swelling or bruising noted. Tenderness to palpation over several joints of the hands and fingers.  Range of motion is reduced. Strength and sensation intact.  Good  strength.    Skin:     General: Skin is warm and dry.   Neurological:      Mental Status: She is alert.   Psychiatric:         Mood and Affect: Mood normal.           Results      Assessment & Plan:     1. Current severe episode of major depressive disorder without psychotic features without prior episode (HCC)  buPROPion (WELLBUTRIN XL) 150 MG XL tablet    Referral to Behavioral Health      2. Bilateral hand pain  DX-JOINT SURVEY-HANDS SINGLE VIEW    MILDRED ANTIBODY WITH REFLEX    CCP ANTIBODIES, IGG/IGA    CRP QUANTITIVE (NON-CARDIAC)    RHEUMATOID ARTHRITIS FACTOR    Sed Rate      3. Ear itching  Fluocinolone Acetonide 0.01 % Oil          Assessment & Plan  1. Depression.  Chronic, uncontrolled.  PHQ-9 indicates severe levels of depression.  Denies any SI/HI.  The dosage of Wellbutrin will be escalated to 300 mg, to be taken twice daily. A referral to behavioral health will be made for counseling services.  We have follow-up scheduled in 4 weeks, we will we will reassess.    2. Hand pain.  Chronic, bilateral hand pain, stiffness.  Symptoms are uncontrolled and are worsening.  She has previously been diagnosed with carpal tunnel syndrome and I have encouraged her to follow-up with orthopedics, she has a current active referral.  However given her description of pain, swelling, and stiffness, imaging of her hands and labs will be ordered as well    3. Ear swelling.  A steroid ointment will be prescribed for use in the ears for 1 to 2 weeks.      Follow-up  A follow-up appointment is scheduled for 1 month from now.          Please note that this dictation was created using voice recognition software. I have made every reasonable attempt to correct obvious errors, but I expect that  there are errors of grammar and possibly content that I did not discover before finalizing the note.

## 2024-07-05 ENCOUNTER — OFFICE VISIT (OUTPATIENT)
Dept: URGENT CARE | Facility: CLINIC | Age: 54
End: 2024-07-05
Payer: COMMERCIAL

## 2024-07-05 VITALS
TEMPERATURE: 96.3 F | HEART RATE: 97 BPM | WEIGHT: 172 LBS | DIASTOLIC BLOOD PRESSURE: 84 MMHG | RESPIRATION RATE: 16 BRPM | SYSTOLIC BLOOD PRESSURE: 126 MMHG | BODY MASS INDEX: 31.65 KG/M2 | HEIGHT: 62 IN | OXYGEN SATURATION: 97 %

## 2024-07-05 DIAGNOSIS — H60.502 ACUTE OTITIS EXTERNA OF LEFT EAR, UNSPECIFIED TYPE: ICD-10-CM

## 2024-07-05 DIAGNOSIS — H69.93 DYSFUNCTION OF BOTH EUSTACHIAN TUBES: ICD-10-CM

## 2024-07-05 PROCEDURE — 99213 OFFICE O/P EST LOW 20 MIN: CPT | Performed by: PHYSICIAN ASSISTANT

## 2024-07-05 PROCEDURE — 3074F SYST BP LT 130 MM HG: CPT | Performed by: PHYSICIAN ASSISTANT

## 2024-07-05 PROCEDURE — 3079F DIAST BP 80-89 MM HG: CPT | Performed by: PHYSICIAN ASSISTANT

## 2024-07-05 RX ORDER — PREDNISONE 10 MG/1
TABLET ORAL
Qty: 5 TABLET | Refills: 0 | Status: SHIPPED | OUTPATIENT
Start: 2024-07-05 | End: 2024-07-08

## 2024-07-05 RX ORDER — OFLOXACIN 3 MG/ML
5 SOLUTION AURICULAR (OTIC) DAILY
Qty: 10 ML | Refills: 0 | Status: SHIPPED | OUTPATIENT
Start: 2024-07-05

## 2024-07-05 ASSESSMENT — ENCOUNTER SYMPTOMS
NECK PAIN: 0
DIZZINESS: 1
EYE PAIN: 0
COUGH: 0
PSYCHIATRIC NEGATIVE: 1
NAUSEA: 1
FEVER: 0
SINUS PAIN: 0

## 2024-07-05 ASSESSMENT — FIBROSIS 4 INDEX: FIB4 SCORE: 1.11

## 2024-07-08 ENCOUNTER — OFFICE VISIT (OUTPATIENT)
Dept: MEDICAL GROUP | Facility: PHYSICIAN GROUP | Age: 54
End: 2024-07-08
Payer: COMMERCIAL

## 2024-07-08 ENCOUNTER — HOSPITAL ENCOUNTER (OUTPATIENT)
Dept: LAB | Facility: MEDICAL CENTER | Age: 54
End: 2024-07-08
Attending: STUDENT IN AN ORGANIZED HEALTH CARE EDUCATION/TRAINING PROGRAM
Payer: COMMERCIAL

## 2024-07-08 VITALS — OXYGEN SATURATION: 95 % | HEART RATE: 92 BPM | HEIGHT: 62 IN | BODY MASS INDEX: 31.65 KG/M2 | WEIGHT: 172 LBS

## 2024-07-08 DIAGNOSIS — E11.65 TYPE 2 DIABETES MELLITUS WITH HYPERGLYCEMIA, WITHOUT LONG-TERM CURRENT USE OF INSULIN (HCC): ICD-10-CM

## 2024-07-08 DIAGNOSIS — M79.641 BILATERAL HAND PAIN: ICD-10-CM

## 2024-07-08 DIAGNOSIS — M79.642 BILATERAL HAND PAIN: ICD-10-CM

## 2024-07-08 LAB
CRP SERPL HS-MCNC: <0.3 MG/DL (ref 0–0.75)
ERYTHROCYTE [SEDIMENTATION RATE] IN BLOOD BY WESTERGREN METHOD: 4 MM/HOUR (ref 0–25)
RHEUMATOID FACT SER IA-ACNC: 10 IU/ML (ref 0–14)

## 2024-07-08 PROCEDURE — 86431 RHEUMATOID FACTOR QUANT: CPT

## 2024-07-08 PROCEDURE — 86200 CCP ANTIBODY: CPT

## 2024-07-08 PROCEDURE — 86225 DNA ANTIBODY NATIVE: CPT

## 2024-07-08 PROCEDURE — 86140 C-REACTIVE PROTEIN: CPT

## 2024-07-08 PROCEDURE — 86039 ANTINUCLEAR ANTIBODIES (ANA): CPT

## 2024-07-08 PROCEDURE — 85652 RBC SED RATE AUTOMATED: CPT

## 2024-07-08 PROCEDURE — 86038 ANTINUCLEAR ANTIBODIES: CPT

## 2024-07-08 PROCEDURE — 99401 PREV MED CNSL INDIV APPRX 15: CPT

## 2024-07-08 PROCEDURE — 36415 COLL VENOUS BLD VENIPUNCTURE: CPT

## 2024-07-08 PROCEDURE — 86235 NUCLEAR ANTIGEN ANTIBODY: CPT

## 2024-07-08 RX ORDER — SEMAGLUTIDE 2.68 MG/ML
2 INJECTION, SOLUTION SUBCUTANEOUS
Qty: 9 ML | Refills: 1 | Status: SHIPPED | OUTPATIENT
Start: 2024-07-08 | End: 2024-07-11 | Stop reason: SDUPTHER

## 2024-07-08 RX ORDER — SEMAGLUTIDE 2.68 MG/ML
2 INJECTION, SOLUTION SUBCUTANEOUS
Qty: 3 ML | Refills: 6 | Status: SHIPPED | OUTPATIENT
Start: 2024-07-08 | End: 2024-07-08

## 2024-07-08 ASSESSMENT — FIBROSIS 4 INDEX: FIB4 SCORE: 1.11

## 2024-07-10 ENCOUNTER — OFFICE VISIT (OUTPATIENT)
Dept: MEDICAL GROUP | Facility: PHYSICIAN GROUP | Age: 54
End: 2024-07-10
Payer: COMMERCIAL

## 2024-07-10 VITALS
DIASTOLIC BLOOD PRESSURE: 70 MMHG | WEIGHT: 171.6 LBS | HEIGHT: 62 IN | BODY MASS INDEX: 31.58 KG/M2 | HEART RATE: 91 BPM | TEMPERATURE: 98.1 F | OXYGEN SATURATION: 99 % | SYSTOLIC BLOOD PRESSURE: 118 MMHG

## 2024-07-10 DIAGNOSIS — F32.2 CURRENT SEVERE EPISODE OF MAJOR DEPRESSIVE DISORDER WITHOUT PSYCHOTIC FEATURES WITHOUT PRIOR EPISODE (HCC): ICD-10-CM

## 2024-07-10 LAB
CCP IGA+IGG SERPL IA-ACNC: 7 UNITS (ref 0–19)
NUCLEAR IGG SER QL IA: DETECTED

## 2024-07-10 PROCEDURE — 3078F DIAST BP <80 MM HG: CPT | Performed by: STUDENT IN AN ORGANIZED HEALTH CARE EDUCATION/TRAINING PROGRAM

## 2024-07-10 PROCEDURE — 99214 OFFICE O/P EST MOD 30 MIN: CPT | Performed by: STUDENT IN AN ORGANIZED HEALTH CARE EDUCATION/TRAINING PROGRAM

## 2024-07-10 PROCEDURE — 3074F SYST BP LT 130 MM HG: CPT | Performed by: STUDENT IN AN ORGANIZED HEALTH CARE EDUCATION/TRAINING PROGRAM

## 2024-07-10 RX ORDER — DULOXETIN HYDROCHLORIDE 30 MG/1
30 CAPSULE, DELAYED RELEASE ORAL DAILY
Qty: 90 CAPSULE | Refills: 0 | Status: SHIPPED | OUTPATIENT
Start: 2024-07-10

## 2024-07-10 ASSESSMENT — ENCOUNTER SYMPTOMS
HEADACHES: 0
SHORTNESS OF BREATH: 0
FEVER: 0
DIZZINESS: 0
CHILLS: 0

## 2024-07-10 ASSESSMENT — PATIENT HEALTH QUESTIONNAIRE - PHQ9
CLINICAL INTERPRETATION OF PHQ2 SCORE: 5
SUM OF ALL RESPONSES TO PHQ QUESTIONS 1-9: 18
5. POOR APPETITE OR OVEREATING: 2 - MORE THAN HALF THE DAYS

## 2024-07-10 ASSESSMENT — FIBROSIS 4 INDEX: FIB4 SCORE: 1.11

## 2024-07-11 LAB
ANA PAT SER IF-IMP: ABNORMAL
ANA PAT SER IF-IMP: ABNORMAL
DSDNA AB TITR SER CLIF: NORMAL {TITER}
NUCLEAR IGG SER QL IF: DETECTED
NUCLEAR IGG TITR SER IF: ABNORMAL {TITER}

## 2024-07-12 LAB — U1 SNRNP IGG SER QL: 32 UNITS (ref 0–19)

## 2024-07-12 RX ORDER — SEMAGLUTIDE 2.68 MG/ML
2 INJECTION, SOLUTION SUBCUTANEOUS
Qty: 9 ML | Refills: 1 | Status: SHIPPED | OUTPATIENT
Start: 2024-07-12

## 2024-07-13 ENCOUNTER — HOSPITAL ENCOUNTER (OUTPATIENT)
Dept: RADIOLOGY | Facility: MEDICAL CENTER | Age: 54
End: 2024-07-13
Attending: STUDENT IN AN ORGANIZED HEALTH CARE EDUCATION/TRAINING PROGRAM
Payer: COMMERCIAL

## 2024-07-13 DIAGNOSIS — M79.642 BILATERAL HAND PAIN: ICD-10-CM

## 2024-07-13 DIAGNOSIS — M79.641 BILATERAL HAND PAIN: ICD-10-CM

## 2024-07-13 LAB
ENA JO1 AB TITR SER: 2 AU/ML (ref 0–40)
ENA SCL70 IGG SER QL: 9 AU/ML (ref 0–40)
ENA SM IGG SER-ACNC: 6 AU/ML (ref 0–40)
ENA SS-A 60KD AB SER-ACNC: 0 AU/ML (ref 0–40)
ENA SS-A IGG SER QL: 5 AU/ML (ref 0–40)
ENA SS-B IGG SER IA-ACNC: 25 AU/ML (ref 0–40)

## 2024-07-13 PROCEDURE — 77077 JOINT SURVEY SINGLE VIEW: CPT

## 2024-07-16 DIAGNOSIS — R76.8 POSITIVE ANA (ANTINUCLEAR ANTIBODY): ICD-10-CM

## 2024-07-16 DIAGNOSIS — M79.642 BILATERAL HAND PAIN: ICD-10-CM

## 2024-07-16 DIAGNOSIS — M25.531 BILATERAL WRIST PAIN: ICD-10-CM

## 2024-07-16 DIAGNOSIS — M25.532 BILATERAL WRIST PAIN: ICD-10-CM

## 2024-07-16 DIAGNOSIS — M79.641 BILATERAL HAND PAIN: ICD-10-CM

## 2024-07-16 DIAGNOSIS — R76.8 POSITIVE SM/RNP ANTIBODY: ICD-10-CM

## 2024-08-07 DIAGNOSIS — R76.8 POSITIVE ANA (ANTINUCLEAR ANTIBODY): ICD-10-CM

## 2024-08-07 DIAGNOSIS — M25.50 POLYARTHRALGIA: ICD-10-CM

## 2024-08-17 ENCOUNTER — OCCUPATIONAL MEDICINE (OUTPATIENT)
Dept: URGENT CARE | Facility: CLINIC | Age: 54
End: 2024-08-17
Payer: COMMERCIAL

## 2024-08-17 ENCOUNTER — APPOINTMENT (OUTPATIENT)
Dept: URGENT CARE | Facility: CLINIC | Age: 54
End: 2024-08-17
Payer: COMMERCIAL

## 2024-08-17 VITALS
OXYGEN SATURATION: 97 % | WEIGHT: 169 LBS | RESPIRATION RATE: 18 BRPM | SYSTOLIC BLOOD PRESSURE: 122 MMHG | BODY MASS INDEX: 31.1 KG/M2 | TEMPERATURE: 97.3 F | HEART RATE: 82 BPM | DIASTOLIC BLOOD PRESSURE: 80 MMHG | HEIGHT: 62 IN

## 2024-08-17 DIAGNOSIS — R29.898 HAND WEAKNESS: ICD-10-CM

## 2024-08-17 PROCEDURE — 99213 OFFICE O/P EST LOW 20 MIN: CPT

## 2024-08-17 ASSESSMENT — ENCOUNTER SYMPTOMS
TINGLING: 0
FOCAL WEAKNESS: 1

## 2024-08-17 ASSESSMENT — FIBROSIS 4 INDEX: FIB4 SCORE: 1.11

## 2024-08-17 NOTE — LETTER
"    EMPLOYEE’S CLAIM FOR COMPENSATION/ REPORT OF INITIAL TREATMENT  FORM C-4  PLEASE TYPE OR PRINT    EMPLOYEE’S CLAIM - PROVIDE ALL INFORMATION REQUESTED   First Name                    CARLOS Isidro                  Last Name  Alpa Birthdate                    1970                Sex  Female Claim Number (Insurer’s Use Only)     [place]  [address]    [place] Age  53 y.o. Height  1.575 m (5' 2\") Weight  76.7 kg (169 lb) Social Security Number     [place]  [address] Telephone  [phone] (home)    Mailing Address  [address]    [place] [facility]  [address] Primary Language Spoken  English    INSURER   THIRD-PARTY   [employer]   Employee's Occupation (Job Title) When Injury or Occupational Disease Occurred      Employer's Name/Company Name  [employer]  Telephone  [phone]    Office Mail Address (Number and Street)  [address] [facility]     Date of Injury (if applicable)                Hours Injury (if applicable)   Date Employer Notified   Last Day of Work after Injury or Occupational Disease   Supervisor to Whom Injury Reported     Address or Location of Accident (if applicable)     What were you doing at the time of accident? (if applicable)      How did this injury or occupational disease occur? (Be specific and answer in detail. Use additional sheet if necessary)     If you believe that you have an occupational disease, when did you first have knowledge of the disability and its relationship to your employment?   Witnesses to the Accident (if applicable)        Nature of Injury or Occupational Disease    Part(s) of Body Injured or Affected        I CERTIFY THAT THE ABOVE IS TRUE AND CORRECT TO T HE BEST OF MY KNOWLEDGE AND THAT I HAVE PROVIDED THIS INFORMATION IN ORDER TO OBTAIN THE BENEFITS OF [place] AND [place]CCUP[place] DISEAS[place] " [place], OR [place]  I HEREBY AUTHORIZE ANY PHYSICIAN, CHIROPRACTOR, SURGEON, PRACTITIONER OR ANY OTHER PERSON, ANY HOSPITAL, INCLUDING [facility] OR [facility], ANY  MEDICAL SERVICE ORGANIZATION, ANY INSURANCE COMPANY, OR OTHER INSTITUTION OR ORGANIZATION TO RELEASE TO EACH OTHER, ANY MEDICAL OR OTHER INFORMATION, INCLUDING BENEFITS PAID OR PAYABLE, PERTINENT TO THIS INJURY OR DISEASE, EXCEPT INFORMATION RELATIVE TO DIAGNOSIS, TREATMENT AND/OR COUNSELING FOR AIDS, PSYCHOLOGICAL CONDITIONS, ALCOHOL OR CONTROLLED SUBSTANCES, FOR WHICH I MUST GIVE SPECIFIC AUTHORIZATION.  A PHOTOSTAT OF THIS AUTHORIZATION SHALL BE VALID AS THE ORIGINAL.     Date   Place Employee’s Original or  *Electronic Signature   THIS REPORT MUST BE COMPLETED AND MAILED WITHIN 3 WORKING DAYS OF TREATMENT   Place  [facility]    Name of Facility  [facility]   Date 8/17/2024 Diagnosis and Description of Injury or Occupational Disease  (R29.898) Hand weakness  The encounter diagnosis was Hand weakness. Is there evidence that the injured employee was under the influence of alcohol and/or another controlled substance at the time of accident?  []No  [] Yes (if yes, please explain)   Hour 3:51 PM  No   Treatment: -Referral to occupational medicine. Wrist braces to be worn at work and during the night/time of sleep. Tylenol OTC for pain.     Have you advised the patient to remain off work five days or more?   [] Yes Indicate dates: From   To    [] No      If no, is the injured employee capable of: [] full duty [] modified duty                     If modified duty, specify any limitations / restrictions:  Patient can perform full duty but must wear wrist braces while at work.                                                                                                                                                                                                                                                                                                                                                                                                                 X-Ray Findings:      From information given by the employee, together with medical evidence, can you directly connect this injury or occupational disease as job incurred?  []Yes   [] No Yes    Is additional medical care by a physician indicated? []Yes [] No  Yes    Do you know of any previous injury or disease contributing to this condition or occupational disease? []Yes [] No (Explain if yes)                          Yes  Comments:Osteoarthritis   Date  8/17/2024 Print Health Care Provider’s Name  Pamela Rangel P.A.-C. I certify that the employer’s copy of  this form was delivered to the employer on:   Address  [address] INSURER'S USE ONLY                       [facility]  [address] Provider’s Tax ID Number  [id number]   Telephone  Dept: [phone]    Health Care Provider’s Original or Electronic Signature  e-SignPAMELA RANGEL P.A.-C. Degree (MD,DO, DC,PACateC,APRN)  [place]  Choose (if applicable)      ORIGINAL - TREATING HEALTHCARE PROVIDER PAGE 2 - INSURER/TPA PAGE 3 - EMPLOYER PAGE 4 - EMPLOYEE             Form C-4 (rev.08/23)

## 2024-08-17 NOTE — LETTER
PHYSICIAN’S AND CHIROPRACTIC PHYSICIAN'S   PROGRESS REPORT CERTIFICATION OF DISABILITY Claim Number:     Social Security Number:    Patient’s Name:Sanna Yuen Date of Injury:   Employer: [employer] Name of [employer] (if applicable)      Patient’s Job Description/Occupation:        Previous Injuries/Diseases/Surgeries Contributing to the Condition:  Hx of osteoarthritis. Patient was told in past she may have carpal tunnel syndrome. Never formally diangosed.       Diagnosis:  (R29.898) Hand weakness      Related to the Industrial Injury? Yes     Explain:DOI: 5/1/2021  VIMAL: Patient has developed bilateral hand swelling and hand weakness and frequently drops objects. She has difficulty performing fine motor tasks such as writing or opening a food wrapper. She feels that these symptoms are secondary to repetitive movement through work. She works in the [employer] at her job and frequently slices meats and makes sandwiches and performs repetitive hand motions.   2nd job: None.   Prior injury: Patient suspects she may have carpal tunnel syndrome and was told years ago that she may have CPS, but never had formally diagnosed. Patient has history of osteoarthritis.       Objective Medical Findings:Hands:  strength 2/5 present bilaterally. Capillary refill present in less than 2 seconds. Full active ROM in hands. Positive Tinel sign, R wrist. Negative Tinel sign, L wrist. Negative Phalen test.         None - Discharged                         Stable  Yes                 Ratable  No       Generally Improved                        Condition Worsened                 Condition Same  May Have Suffered a Permanent Disability  No     Treatment Plan:    -Referral occupational medicine, Tylenol OTC for pain, light duty at work         No Change in Therapy                 PT/OT Prescribed                   X  Medication May be Used While Working       Case Management                        PT/OT Discontinued    Consultation     Further Diagnostic Studies:                               Prescription(s)                             Released to FULL DUTY /No Restrictions on (Date):  From:      Certified TOTALLY TEMPORARILY DISABLED (Indicate Dates) From:   To:    X  Released to RESTRICTED/Modified Duty on (Date): From: 8/17/2024 To: 8/23/2024                                                               Restrictions Are:  Temporary     No Sitting                               No Standing                   No Pulling                  Other: Wear wrist braces while at work and while sleeping.     No Bending at Waist           No Stooping                    No Lifting       No Carrying                           No Walking                Lifting Restricted to (lbs.):       No Pushing                            No Climbing                   No Reaching Above Shoulders   Date of Next Visit:  8/23/2024 Date of this Exam:8/17/2024 Physician/Chiropractic Physician Name:Pamela Olmstead PKasiAKasi-C. Physician/Chiropractic Physician Signature:  Wolfgang Main DO MPH   D-39 (Rev. 2/24)

## 2024-08-17 NOTE — PROGRESS NOTES
Subjective:     Sanna Yuen is a 53 y.o. female who presents for Work-Related Injury (Bilateral hand & wrist swelling/constantly dropping objects/pain/tingling numb  )    DOI: 5/1/2021  VIMAL: Patient has developed bilateral hand swelling and hand weakness and frequently drops objects. She has difficulty performing fine motor tasks such as writing or opening a food wrapper. She feels that these symptoms are secondary to repetitive movement through work. She works in the [employer] at her job and frequently slices meats and makes sandwiches and performs repetitive hand motions.   2nd job: None.   Prior injury: Patient suspects she may have carpal tunnel syndrome and was told years ago that she may have CPS, but never had formally diagnosed. Patient has history of osteoarthritis.        Review of Systems   Neurological:  Positive for focal weakness (Hands). Negative for tingling.       PMH:  has a past medical history of Arthritis, Diabetes (HCC), Hyperlipidemia, and Hypertension.    She has no past medical history of Breast cancer ([facility]).  MEDS:   Current Outpatient Medications:   •  Semaglutide, 2 MG/DOSE, (OZEMPIC, 2 MG/DOSE,) 8 MG/3ML Solution Pen-injector, Inject 2 mg under the skin every 7 days., Disp: 9 mL, Rfl: 1  •  DULoxetine (CYMBALTA) 30 MG Cap DR Particles, Take 1 Capsule by mouth every day., Disp: 90 Capsule, Rfl: 0  •  ofloxacin otic sol (FLOXIN OTIC) 0.3 % Solution, Administer 5 Drops into affected ear(s) every day., Disp: 10 mL, Rfl: 0  •  buPROPion (WELLBUTRIN XL) 150 MG XL tablet, Take 2 Tablets by mouth every morning., Disp: 180 Tablet, Rfl: 0  •  Fluocinolone Acetonide 0.01 % Oil, 1 drop in each ear twice daily for 1-2 weeks., Disp: 20 mL, Rfl: 0  •  atenolol (TENORMIN) 50 MG Tab, Take 1 Tablet by mouth every day., Disp: 90 Tablet, Rfl: 3  •  pioglitazone (ACTOS) 30 MG Tab, Take 1 Tablet by mouth every day., Disp: 90 Tablet, Rfl: 3  •  losartan (COZAAR) 25 MG Tab, Take 1 Tablet by mouth every  day., Disp: 90 Tablet, Rfl: 3  •  Empagliflozin (JARDIANCE) 25 MG Tab, Take 25 mg by mouth every day., Disp: 90 Tablet, Rfl: 3  •  fenofibrate (TRICOR) 145 MG Tab, Take 1 Tablet by mouth every day., Disp: 90 Tablet, Rfl: 3  •  amLODIPine (NORVASC) 10 MG Tab, Take 1 Tablet by mouth every day., Disp: 90 Tablet, Rfl: 3  •  metformin (GLUCOPHAGE) 1000 MG tablet, Take 1 Tablet by mouth 2 times a day., Disp: 180 Tablet, Rfl: 3  •  ferrous sulfate 325 (65 Fe) MG tablet, Take 325 mg by mouth every day., Disp: , Rfl:   •  rosuvastatin (CRESTOR) 20 MG Tab, Take 1 Tablet by mouth every evening., Disp: 90 Tablet, Rfl: 1  •  triamcinolone acetonide (KENALOG) 0.1 % Cream, Apply 1 Application topically 2 times a day. Apply to affected areas. Use for up to two weeks., Disp: 80 g, Rfl: 0  •  coenzyme Q-10 30 MG capsule, Take 60 mg by mouth every day., Disp: , Rfl:   •  glucose blood (RELION TRUE METRIX TEST STRIPS) strip, 1 Strip by Other route 2 times a day., Disp: 180 Strip, Rfl: 1  •  cetirizine (ZYRTEC) 10 MG Tab, Take 10 mg by mouth every day., Disp: , Rfl:   •  Multiple Vitamins-Minerals (MULTIVITAMIN ADULT PO), Take  by mouth., Disp: , Rfl:   •  Multiple Vitamins-Minerals (EYE VITAMINS PO), Take  by mouth., Disp: , Rfl:   ALLERGIES:   Allergies   Allergen Reactions   • Lipitor [Atorvastatin] Myalgia     Gave her muscle cramps and caused pain in her legs   • Niacin Itching     Flushed face   • Penicillins Itching and Unspecified     Mouth sores     SURGHX:   Past Surgical History:   Procedure Laterality Date   • [place] TOTAL HIP ARTHROPLASTY Right 5/12/2022    Procedure: RIGHT ANTERIOR TOTAL HIP ARTHROPLASTY;  Surgeon: Frankie Gaytan M.D.;  Location: [facility];  Service: Orthopedics   • CHOLECYSTECTOMY     • PRIMARY C SECTION     • TUBAL COAGULATION LAPAROSCOPIC BILATERAL       SOCHX:  reports that she has been smoking cigarettes. She started smoking about 38 years ago. She has a 19.3 pack-year smoking history.  "She has never used smokeless tobacco. She reports that she does not currently use alcohol. She reports that she does not use drugs.  FH: Family history was reviewed, no pertinent findings to report     Objective:     /80 (BP Location: Left arm, Patient Position: Sitting, BP Cuff Size: Large adult)   Pulse 82   Temp 36.3 °C (97.3 °F) (Temporal)   Resp 18   Ht 1.575 m (5' 2\")   Wt 76.7 kg (169 lb)   LMP  (LMP Unknown)   SpO2 97%   BMI 30.91 kg/m²     Constitutional: Patient is in no acute distress. Appears well-developed and well-nourished.   Cardiovascular: Normal rate.    Pulmonary/Chest: Effort normal. No respiratory distress.   Neurological: Patient is alert and oriented to person, place, and time.   Skin: Skin is warm and dry.   Psychiatric: Normal mood and affect. Behavior is normal.     Hands:  strength 2/5 present bilaterally. Capillary refill present in less than 2 seconds. Full active ROM in hands. Positive Tinel sign, R wrist. Negative Tinel sign, L wrist. Negative Phalen test.     Assessment/Plan:       1. Hand weakness  - Referral to Occupational Medicine      FROM  8/17/24  TO 8/23/24   Wear wrist braces while at work and while sleeping.   -Referral to occupational medicine  -Tylenol OTC       Differential diagnosis, natural history, supportive care, and indications for immediate follow-up discussed.    Approximately 25 minutes was spent in preparing for visit, obtaining history, exam and evaluation, patient counseling/education and post visit documentation/orders.    "

## 2024-08-20 ENCOUNTER — OFFICE VISIT (OUTPATIENT)
Dept: BEHAVIORAL HEALTH | Facility: CLINIC | Age: 54
End: 2024-08-20
Payer: COMMERCIAL

## 2024-08-20 DIAGNOSIS — F43.10 POST TRAUMATIC STRESS DISORDER (PTSD): ICD-10-CM

## 2024-08-20 DIAGNOSIS — F33.2 SEVERE EPISODE OF RECURRENT MAJOR DEPRESSIVE DISORDER, WITHOUT PSYCHOTIC FEATURES (HCC): ICD-10-CM

## 2024-08-20 PROCEDURE — 90791 PSYCH DIAGNOSTIC EVALUATION: CPT | Performed by: SOCIAL WORKER

## 2024-08-20 ASSESSMENT — ANXIETY QUESTIONNAIRES
1. FEELING NERVOUS, ANXIOUS, OR ON EDGE: NEARLY EVERY DAY
4. TROUBLE RELAXING: NEARLY EVERY DAY
6. BECOMING EASILY ANNOYED OR IRRITABLE: NEARLY EVERY DAY
GAD7 TOTAL SCORE: 18
2. NOT BEING ABLE TO STOP OR CONTROL WORRYING: MORE THAN HALF THE DAYS
5. BEING SO RESTLESS THAT IT IS HARD TO SIT STILL: MORE THAN HALF THE DAYS
3. WORRYING TOO MUCH ABOUT DIFFERENT THINGS: MORE THAN HALF THE DAYS
IF YOU CHECKED OFF ANY PROBLEMS ON THIS QUESTIONNAIRE, HOW DIFFICULT HAVE THESE PROBLEMS MADE IT FOR YOU TO DO YOUR WORK, TAKE CARE OF THINGS AT HOME, OR GET ALONG WITH OTHER PEOPLE: EXTREMELY DIFFICULT
7. FEELING AFRAID AS IF SOMETHING AWFUL MIGHT HAPPEN: NEARLY EVERY DAY
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
## 2024-08-23 ENCOUNTER — OCCUPATIONAL MEDICINE (OUTPATIENT)
Dept: URGENT CARE | Facility: CLINIC | Age: 54
End: 2024-08-23
Payer: COMMERCIAL

## 2024-08-23 VITALS
HEIGHT: 62 IN | RESPIRATION RATE: 16 BRPM | OXYGEN SATURATION: 97 % | WEIGHT: 170 LBS | TEMPERATURE: 98.5 F | BODY MASS INDEX: 31.28 KG/M2 | DIASTOLIC BLOOD PRESSURE: 86 MMHG | HEART RATE: 92 BPM | SYSTOLIC BLOOD PRESSURE: 122 MMHG

## 2024-08-23 DIAGNOSIS — R29.898 HAND WEAKNESS: ICD-10-CM

## 2024-08-23 DIAGNOSIS — M77.8 TENDINITIS OF BOTH WRISTS: ICD-10-CM

## 2024-08-23 PROCEDURE — 3079F DIAST BP 80-89 MM HG: CPT | Performed by: PHYSICIAN ASSISTANT

## 2024-08-23 PROCEDURE — 3074F SYST BP LT 130 MM HG: CPT | Performed by: PHYSICIAN ASSISTANT

## 2024-08-23 PROCEDURE — 99213 OFFICE O/P EST LOW 20 MIN: CPT | Performed by: PHYSICIAN ASSISTANT

## 2024-08-23 ASSESSMENT — FIBROSIS 4 INDEX: FIB4 SCORE: 1.11

## 2024-08-23 NOTE — LETTER
PHYSICIAN’S AND CHIROPRACTIC PHYSICIAN'S   PROGRESS REPORT CERTIFICATION OF DISABILITY Claim Number:     Social Security Number:    Patient’s Name: Sanna Yuen Date of Injury: 5/20/2024   Employer: [employer] Name of O (if applicable):      Patient’s Job Description/Occupation: @[id number](CLM,312,1,,,,,2) )@       Previous Injuries/Diseases/Surgeries Contributing to the Condition:  Hx of osteoarthritis. Patient was told in past she may have carpal tunnel syndrome. Never formally diangosed.      Diagnosis: (M77.8) Tendinitis of both wrists  (R29.898) Hand weakness      Related to the Industrial Injury? Yes     Explain: Repetitive motion at work      Objective Medical Findings: Patient has tenderness to the wrist bilaterally.  Positive Tinel testing bilaterally.  Negative Phalen testing bilaterally.  Patient has decreased  strength secondary to discomfort in the wrist bilaterally.  Neurovascular intact distally.  Less than 2 capillary fill.  Decreased range of motion secondary to discomfort in the wrist bilaterally.         None - Discharged                         Stable  Yes                 Ratable  Yes        Generally Improved                         Condition Worsened               X   Condition Same  May Have Suffered a Permanent Disability No     Treatment Plan:    Would recommend following up with occupational medicine for her next visit within the next 1 to 2 weeks.  She is understanding to wear the wrist splints bilaterally for entirety of the day and can take them off to wash her hands or shower but otherwise should be wearing them most of the day.  Would recommend continued use of ibuprofen over-the-counter per 's instructions.  Follow-up sooner with any new or worsening symptoms.         No Change in Therapy                  PT/OT Prescribed                      Medication May be Used While Working        Case Management                          PT/OT Discontinued     Consultation    Further Diagnostic Studies:    Prescription(s)                 Released to FULL DUTY /No Restrictions on (Date):  From:      Certified TOTALLY TEMPORARILY DISABLED (Indicate Dates) From:   To:    X  Released to RESTRICTED/Modified Duty on (Date): From:   To:    Restrictions Are:         No Sitting    No Standing X   No Pulling Other: Patient should wear wrist splint most of the day and at night while sleeping.       No Bending at Waist     No Stooping     No Lifting    X    No Carrying     No Walking Lifting Restricted to (lbs.):       X   No Pushing     X   No Climbing     No Reaching Above Shoulders       Date of Next Visit:  9/6/2024 Date of this Exam: 8/23/2024 Physician/Chiropractic Physician Name: Spencer Craft P.A.-C. Physician/Chiropractic Physician Signature:  Wolfgang Main DO MPH                                                                                                                                                                                                            D-39 (Rev. 2/24)

## 2024-08-23 NOTE — PROGRESS NOTES
"Subjective     Sanna Yuen is a 53 y.o. female who presents with Work-Related Injury (Worker's comp follow-up, both hands and wrist were swollen on 08/17)            HPI  Initial Copied \" DOI: 5/1/2021  VIMAL: Patient has developed bilateral hand swelling and hand weakness and frequently drops objects. She has difficulty performing fine motor tasks such as writing or opening a food wrapper. She feels that these symptoms are secondary to repetitive movement through work. She works in the [employer] at her job and frequently slices meats and makes sandwiches and performs repetitive hand motions.   2nd job: None.   Prior injury: Patient suspects she may have carpal tunnel syndrome and was told years ago that she may have CPS, but never had formally diagnosed. Patient has history of osteoarthritis. \"    8/23/2024: This is the patient's second visit regarding work-related injury.  Patient states she was only wearing the wrist braces at night as she thought that this is what she was instructed to do.  She states that she has not been back to work and they put her on leave of absence.  She has been taking ibuprofen intermittently.    Review of Systems   Musculoskeletal:         Bilateral wrist pain              Objective     /86 (BP Location: Left arm, Patient Position: Sitting, BP Cuff Size: Adult)   Pulse 92   Temp 36.9 °C (98.5 °F) (Temporal)   Resp 16   Ht 1.575 m (5' 2\")   Wt 77.1 kg (170 lb)   LMP  (LMP Unknown)   SpO2 97%   BMI 31.09 kg/m²      Physical Exam  Vitals and nursing note reviewed.   Constitutional:       General: She is not in acute distress.     Appearance: Normal appearance. She is well-developed. She is not ill-appearing or toxic-appearing.   HENT:      Head: Normocephalic and atraumatic.      Right Ear: Hearing normal.      Left Ear: Hearing normal.   Cardiovascular:      Rate and Rhythm: Normal rate and regular rhythm.      Heart sounds: Normal heart sounds.   Pulmonary:      " Effort: Pulmonary effort is normal.      Breath sounds: Normal breath sounds.   Musculoskeletal:      Comments: Normal movement in all 4 extremities   Skin:     General: Skin is warm and dry.   Neurological:      Mental Status: She is alert.      Coordination: Coordination normal.   Psychiatric:         Mood and Affect: Mood normal.         Patient has tenderness to the wrist bilaterally.  Positive Tinel testing bilaterally.  Negative Phalen testing bilaterally.  Patient has decreased  strength secondary to discomfort in the wrist bilaterally.  Neurovascular intact distally.  Less than 2 capillary fill.  Decreased range of motion secondary to discomfort in the wrist bilaterally.               Assessment & Plan        Assessment & Plan  Tendinitis of both wrists         Hand weakness          Would recommend following up with occupational medicine for her next visit within the next 1 to 2 weeks.  She is understanding to wear the wrist splints bilaterally for entirety of the day and can take them off to wash her hands or shower but otherwise should be wearing them most of the day.  Would recommend continued use of ibuprofen over-the-counter per 's instructions.  Follow-up sooner with any new or worsening symptoms.    Please note that this dictation was created using voice recognition software. I have made every reasonable attempt to correct obvious errors, but I expect that there may be errors of grammar and possibly content that I did not discover before finalizing the note.

## 2024-08-27 ENCOUNTER — OFFICE VISIT (OUTPATIENT)
Dept: MEDICAL GROUP | Facility: PHYSICIAN GROUP | Age: 54
End: 2024-08-27
Payer: COMMERCIAL

## 2024-08-27 VITALS
HEART RATE: 93 BPM | BODY MASS INDEX: 30.73 KG/M2 | HEIGHT: 62 IN | DIASTOLIC BLOOD PRESSURE: 60 MMHG | WEIGHT: 167 LBS | SYSTOLIC BLOOD PRESSURE: 112 MMHG | OXYGEN SATURATION: 98 % | TEMPERATURE: 97.2 F

## 2024-08-27 DIAGNOSIS — Z12.12 ENCOUNTER FOR SCREENING FOR COLORECTAL MALIGNANT NEOPLASM: ICD-10-CM

## 2024-08-27 DIAGNOSIS — Z12.11 ENCOUNTER FOR SCREENING FOR COLORECTAL MALIGNANT NEOPLASM: ICD-10-CM

## 2024-08-27 DIAGNOSIS — F41.9 ANXIETY: ICD-10-CM

## 2024-08-27 DIAGNOSIS — F32.2 CURRENT SEVERE EPISODE OF MAJOR DEPRESSIVE DISORDER WITHOUT PSYCHOTIC FEATURES WITHOUT PRIOR EPISODE (HCC): ICD-10-CM

## 2024-08-27 PROCEDURE — 3078F DIAST BP <80 MM HG: CPT | Performed by: STUDENT IN AN ORGANIZED HEALTH CARE EDUCATION/TRAINING PROGRAM

## 2024-08-27 PROCEDURE — 3074F SYST BP LT 130 MM HG: CPT | Performed by: STUDENT IN AN ORGANIZED HEALTH CARE EDUCATION/TRAINING PROGRAM

## 2024-08-27 PROCEDURE — 99214 OFFICE O/P EST MOD 30 MIN: CPT | Performed by: STUDENT IN AN ORGANIZED HEALTH CARE EDUCATION/TRAINING PROGRAM

## 2024-08-27 RX ORDER — HYDROXYZINE HYDROCHLORIDE 10 MG/1
10 TABLET, FILM COATED ORAL 3 TIMES DAILY PRN
Qty: 30 TABLET | Refills: 0 | Status: SHIPPED | OUTPATIENT
Start: 2024-08-27

## 2024-08-27 ASSESSMENT — PATIENT HEALTH QUESTIONNAIRE - PHQ9
SUM OF ALL RESPONSES TO PHQ QUESTIONS 1-9: 24
CLINICAL INTERPRETATION OF PHQ2 SCORE: 6
5. POOR APPETITE OR OVEREATING: 3 - NEARLY EVERY DAY

## 2024-08-27 ASSESSMENT — ANXIETY QUESTIONNAIRES
1. FEELING NERVOUS, ANXIOUS, OR ON EDGE: NEARLY EVERY DAY
GAD7 TOTAL SCORE: 21
3. WORRYING TOO MUCH ABOUT DIFFERENT THINGS: NEARLY EVERY DAY
6. BECOMING EASILY ANNOYED OR IRRITABLE: NEARLY EVERY DAY
4. TROUBLE RELAXING: NEARLY EVERY DAY
2. NOT BEING ABLE TO STOP OR CONTROL WORRYING: NEARLY EVERY DAY
5. BEING SO RESTLESS THAT IT IS HARD TO SIT STILL: NEARLY EVERY DAY
7. FEELING AFRAID AS IF SOMETHING AWFUL MIGHT HAPPEN: NEARLY EVERY DAY

## 2024-08-27 ASSESSMENT — ENCOUNTER SYMPTOMS
CHILLS: 0
HEADACHES: 0
SHORTNESS OF BREATH: 0
FEVER: 0
DIZZINESS: 0

## 2024-08-27 ASSESSMENT — FIBROSIS 4 INDEX: FIB4 SCORE: 1.11

## 2024-08-27 NOTE — PROGRESS NOTES
Verbal consent was acquired by the patient to use [employer] ambient listening note generation during this visit.    Subjective:     HPI:   History of Present Illness  The patient presents for depression follow up.    She is currently on a regimen of Wellbutrin 300 mg daily and Cymbalta 30 mg daily. She reports no suicidal or self-harming thoughts. No HI.  Depression symptoms are uncontrolled.  She reports initial improvement in symptoms when she started Wellbutrin a few months ago, but reports recent worsening in her depression symptoms.  She thinks this has a lot to do with her current living situation.  She is currently living with her ex-, and reports a lot of stress in this relationship.   She has sought help from a therapist and plans to continue with weekly sessions.     Depression Screening  Little interest or pleasure in doing things?  3 - nearly every day  Feeling down, depressed , or hopeless? 3 - nearly every day  Trouble falling or staying asleep, or sleeping too much?  3 - nearly every day  Feeling tired or having little energy?  3 - nearly every day  Poor appetite or overeating?  3 - nearly every day  Feeling bad about yourself - or that you are a failure or have let yourself or your family down? 3 - nearly every day  Trouble concentrating on things, such as reading the newspaper or watching television? 3 - nearly every day  Moving or speaking so slowly that other people could have noticed.  Or the opposite - being so fidgety or restless that you have been moving around a lot more than usual?  3 - nearly every day  Thoughts that you would be better off dead, or of hurting yourself?  0 - not at all  Patient Health Questionnaire Score: 24    Interpretation of PHQ-9 Total Score   Score Severity   1-4 No Depression   5-9 Mild Depression   10-14 Moderate Depression   15-19 Moderately Severe Depression   20-27 Severe Depression    CRAIG-7 Questionnaire  Feeling nervous, anxious, or on edge: Nearly  every day  Not being able to sop or control worrying: Nearly every day  Worrying too much about different things: Nearly every day  Trouble relaxing: Nearly every day  Being so restless that it's hard to sit still: Nearly every day  Becoming easily annoyed or irritable: Nearly every day  Feeling afraid as if something awful might happen: Nearly every day  Total: 21    Interpretation of CRAIG 7 Total Score   Score Severity :  0-4 No Anxiety   5-9 Mild Anxiety  10-14 Moderate Anxiety  15-21 Severe Anxiety          Past medical, surgical, family, and social history were reviewed and updated.     Medications:    Current Outpatient Medications:     hydrOXYzine HCl (ATARAX) 10 MG Tab, Take 1 Tablet by mouth 3 times a day as needed for Anxiety., Disp: 30 Tablet, Rfl: 0    Semaglutide, 2 MG/DOSE, (OZEMPIC, 2 MG/DOSE,) 8 MG/3ML Solution Pen-injector, Inject 2 mg under the skin every 7 days., Disp: 9 mL, Rfl: 1    DULoxetine (CYMBALTA) 30 MG Cap DR Particles, Take 1 Capsule by mouth every day., Disp: 90 Capsule, Rfl: 0    buPROPion (WELLBUTRIN XL) 150 MG XL tablet, Take 2 Tablets by mouth every morning., Disp: 180 Tablet, Rfl: 0    atenolol (TENORMIN) 50 MG Tab, Take 1 Tablet by mouth every day., Disp: 90 Tablet, Rfl: 3    pioglitazone (ACTOS) 30 MG Tab, Take 1 Tablet by mouth every day., Disp: 90 Tablet, Rfl: 3    losartan (COZAAR) 25 MG Tab, Take 1 Tablet by mouth every day., Disp: 90 Tablet, Rfl: 3    Empagliflozin (JARDIANCE) 25 MG Tab, Take 25 mg by mouth every day., Disp: 90 Tablet, Rfl: 3    fenofibrate (TRICOR) 145 MG Tab, Take 1 Tablet by mouth every day., Disp: 90 Tablet, Rfl: 3    amLODIPine (NORVASC) 10 MG Tab, Take 1 Tablet by mouth every day., Disp: 90 Tablet, Rfl: 3    metformin (GLUCOPHAGE) 1000 MG tablet, Take 1 Tablet by mouth 2 times a day., Disp: 180 Tablet, Rfl: 3    ferrous sulfate 325 (65 Fe) MG tablet, Take 325 mg by mouth every day., Disp: , Rfl:     rosuvastatin (CRESTOR) 20 MG Tab, Take 1 Tablet by  "mouth every evening., Disp: 90 Tablet, Rfl: 1    triamcinolone acetonide (KENALOG) 0.1 % Cream, Apply 1 Application topically 2 times a day. Apply to affected areas. Use for up to two weeks., Disp: 80 g, Rfl: 0    glucose blood (RELION TRUE METRIX TEST STRIPS) strip, 1 Strip by Other route 2 times a day., Disp: 180 Strip, Rfl: 1    cetirizine (ZYRTEC) 10 MG Tab, Take 10 mg by mouth every day., Disp: , Rfl:     Multiple Vitamins-Minerals (MULTIVITAMIN ADULT PO), Take  by mouth., Disp: , Rfl:     Multiple Vitamins-Minerals (EYE VITAMINS PO), Take  by mouth., Disp: , Rfl:     coenzyme Q-10 30 MG capsule, Take 60 mg by mouth every day., Disp: , Rfl:     Allergies:  Lipitor [atorvastatin], Niacin, and Penicillins      Health Maintenance: Completed    ROS:  Review of Systems   Constitutional:  Negative for chills and fever.   Respiratory:  Negative for shortness of breath.    Cardiovascular:  Negative for chest pain.   Neurological:  Negative for dizziness and headaches.       Objective:     Exam:  /60 (BP Location: Left arm, Patient Position: Sitting, BP Cuff Size: Adult)   Pulse 93   Temp 36.2 °C (97.2 °F) (Temporal)   Ht 1.575 m (5' 2\")   Wt 75.8 kg (167 lb)   LMP  (LMP Unknown)   SpO2 98%   BMI 30.54 kg/m²  Body mass index is 30.54 kg/m².    Physical Exam  Vitals reviewed.   Constitutional:       General: She is not in acute distress.  Eyes:      Extraocular Movements: Extraocular movements intact.   Cardiovascular:      Rate and Rhythm: Normal rate and regular rhythm.      Heart sounds: No murmur heard.     No friction rub. No gallop.   Pulmonary:      Effort: Pulmonary effort is normal.      Breath sounds: No wheezing, rhonchi or rales.   Musculoskeletal:      Cervical back: Neck supple.   Skin:     General: Skin is warm and dry.   Neurological:      Mental Status: She is alert.   Psychiatric:         Mood and Affect: Mood normal.         Results      Assessment & Plan:     1. Current severe episode of " major depressive disorder without psychotic features without prior episode (HCC)  Referral to Behavioral Health      2. Encounter for screening for colorectal malignant neoplasm  COLOGUARD (FIT DNA)      3. Anxiety  hydrOXYzine HCl (ATARAX) 10 MG Tab          Assessment & Plan  1. Depression.  Chronic, uncontrolled.  Her PHQ-9 score indicates no suicidal ideation or thoughts of self-harm, but her depression remains poorly controlled.  She will continue her current med.  A referral to a psychiatrist has been made for further evaluation and medication optimization. Hydroxyzine was prescribed for use as needed to manage anxiety. She is advised to continue seeing her therapist weekly as directed.        Follow-up  She will follow up in 2 months.          Please note that this dictation was created using voice recognition software. I have made every reasonable attempt to correct obvious errors, but I expect that there are errors of grammar and possibly content that I did not discover before finalizing the note.

## 2024-09-03 ENCOUNTER — OFFICE VISIT (OUTPATIENT)
Dept: BEHAVIORAL HEALTH | Facility: CLINIC | Age: 54
End: 2024-09-03
Payer: COMMERCIAL

## 2024-09-03 ENCOUNTER — OCCUPATIONAL MEDICINE (OUTPATIENT)
Dept: OCCUPATIONAL MEDICINE | Facility: CLINIC | Age: 54
End: 2024-09-03
Payer: COMMERCIAL

## 2024-09-03 VITALS
SYSTOLIC BLOOD PRESSURE: 125 MMHG | BODY MASS INDEX: 31.1 KG/M2 | HEIGHT: 62 IN | RESPIRATION RATE: 16 BRPM | DIASTOLIC BLOOD PRESSURE: 78 MMHG | WEIGHT: 169 LBS | OXYGEN SATURATION: 100 % | HEART RATE: 98 BPM | TEMPERATURE: 96.1 F

## 2024-09-03 DIAGNOSIS — G56.03 BILATERAL CARPAL TUNNEL SYNDROME: ICD-10-CM

## 2024-09-03 DIAGNOSIS — F33.2 SEVERE EPISODE OF RECURRENT MAJOR DEPRESSIVE DISORDER, WITHOUT PSYCHOTIC FEATURES (HCC): ICD-10-CM

## 2024-09-03 DIAGNOSIS — F43.10 POST TRAUMATIC STRESS DISORDER (PTSD): ICD-10-CM

## 2024-09-03 PROCEDURE — 90837 PSYTX W PT 60 MINUTES: CPT | Performed by: SOCIAL WORKER

## 2024-09-03 PROCEDURE — 1125F AMNT PAIN NOTED PAIN PRSNT: CPT | Performed by: PREVENTIVE MEDICINE

## 2024-09-03 PROCEDURE — 3078F DIAST BP <80 MM HG: CPT | Performed by: PREVENTIVE MEDICINE

## 2024-09-03 PROCEDURE — 99214 OFFICE O/P EST MOD 30 MIN: CPT | Performed by: PREVENTIVE MEDICINE

## 2024-09-03 PROCEDURE — 3074F SYST BP LT 130 MM HG: CPT | Performed by: PREVENTIVE MEDICINE

## 2024-09-03 ASSESSMENT — FIBROSIS 4 INDEX: FIB4 SCORE: 1.11

## 2024-09-03 ASSESSMENT — PAIN SCALES - GENERAL: PAINLEVEL: 8=MODERATE-SEVERE PAIN
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
## 2024-09-03 NOTE — PROGRESS NOTES
"Subjective:     Sanna Yuen is a 53 y.o. female who presents for Follow-Up (Follow Up ([facility] DOI MAY 2021 BOTH HANDS WRIST Worst)RM 16)    DOI: 5/1/2021  VIMAL: Patient has developed bilateral hand swelling and hand weakness and frequently drops objects. She has difficulty performing fine motor tasks such as writing or opening a food wrapper. She feels that these symptoms are secondary to repetitive movement through work.  She was seen twice in urgent care, advised wrist brace and work restrictions.    9/3/2024: Patient states that overall most of the symptoms are slightly worse in the left hand.  She states is more pain there.  She gets occasional numbness and tingling.  However her biggest symptom is weakness in both hands.  She states the weakness is little bit worse in the right hand.  She states that she has trouble gripping and grasping items and often drops things.  She has not noted much improvement with wrist bracing.  She states has been ongoing for many years now.  She has worked as a  for many years and before was working as .      ROS: All systems were reviewed on intake form, form was reviewed and signed. See scanned documents in media. Pertinent positives and negatives included in HPI.    PMH: No pertinent past medical history to this problem  MEDS: Medications were reviewed in Epic  ALLERGIES:   Allergies   Allergen Reactions    Lipitor [Atorvastatin] Myalgia     Gave her muscle cramps and caused pain in her legs    Niacin Itching     Flushed face    Penicillins Itching and Unspecified     Mouth sores     SOCHX: Works as a  at [employer]  FH: No pertinent family history to this problem       Objective:     /78 (BP Location: Right arm, Patient Position: Sitting, BP Cuff Size: Adult)   Pulse 98   Temp (!) 35.6 °C (96.1 °F)   Resp 16   Ht 1.575 m (5' 2\")   Wt 76.7 kg (169 lb)   LMP  (LMP Unknown)   SpO2 100%   BMI 30.91 kg/m²     Constitutional: " Patient is in no acute distress. Appears well-developed and well-nourished.   Cardiovascular: Normal rate.    Pulmonary/Chest: Effort normal. No respiratory distress.   Neurological: Patient is alert and oriented to person, place, and time.   Skin: Skin is warm and dry.   Psychiatric: Normal mood and affect. Behavior is normal.     Bilateral wrist/hands: Hyperthenar atrophy worse on the right than left.  Ganglion cyst palpated at the right ventral aspect of the wrist.  Full range of motion of wrist and digits with some discomfort.  Minimal tenderness throughout both wrists.  Phalen's positive.  4/5  strength.  4/5 strength throughout the right wrist. 4+/5 strength throughout the left wrist.    Assessment/Plan:     1. Bilateral carpal tunnel syndrome  - Referral to Hand Therapy  - Referral to Neurodiagnostics (EEG,EP,EMG/NCS/DBS)      Symptoms and exam findings concerning for carpal tunnel or progressive osteoarthritis  Referral for EMG/NCV bilateral upper extremities  OTC ibuprofen/Tylenol  Use wrist brace at night, optional during the day  Placed referral for hand therapy    Work Status: Restricted Duty - see D-39 or other state/federal worker's compensation forms for specific restrictions if applicable  Follow up 4 weeks    Differential diagnosis, natural history, supportive care, and indications for immediate follow-up discussed.    Approximately 45 minutes were spent in reviewing notes, preparing for visit, obtaining history, exam and evaluation, patient counseling/education, and post visit documentation/orders. Significant time was spent completing state/federal worker's compensation forms.

## 2024-09-03 NOTE — LETTER
PHYSICIAN’S AND CHIROPRACTIC PHYSICIAN'S   PROGRESS REPORT CERTIFICATION OF DISABILITY Claim Number:     Social Security Number:    Patient’s Name: Sanna Yuen Date of Injury: 5/20/2024   Employer: [employer] Name of MCO (if applicable):      Patient’s Job Description/Occupation: [employer]      Previous Injuries/Diseases/Surgeries Contributing to the Condition:         Diagnosis: (G56.03) Bilateral carpal tunnel syndrome      Related to the Industrial Injury? Yes     Explain:        Objective Medical Findings: Bilateral wrist/hands: Hyperthenar atrophy worse on the right than left.  Ganglion cyst palpated at the right ventral aspect of the wrist.  Full range of motion of wrist and digits with some discomfort.  Minimal tenderness throughout both wrists.  Phalen's positive.  4/5  strength.  4/5 strength throughout the right wrist. 4+/5 strength throughout the left wrist.         None - Discharged                         Stable  No                 Ratable  No        Generally Improved                         Condition Worsened               X   Condition Same  May Have Suffered a Permanent Disability No     Treatment Plan:    Symptoms and exam findings concerning for carpal tunnel or progressive osteoarthritis  Referral for EMG/NCV bilateral upper extremities  OTC ibuprofen/Tylenol  Use wrist brace at night, optional during the day  Placed referral for hand therapy         No Change in Therapy               X   PT/OT Prescribed                      Medication May be Used While Working        Case Management                          PT/OT Discontinued    Consultation    Further Diagnostic Studies:    Prescription(s)                 Released to FULL DUTY /No Restrictions on (Date):  From:      Certified TOTALLY TEMPORARILY DISABLED (Indicate Dates) From:   To:    X  Released to RESTRICTED/Modified Duty on (Date): From: 9/3/2024 To: 10/1/2024  Restrictions Are:         No Sitting    No Standing    No Pulling  Other: No lift/push/pull greater than 10 pounds.  Limit repetitive use of the hands to less than 4 hours per shift       No Bending at Waist     No Stooping     No Lifting        No Carrying     No Walking Lifting Restricted to (lbs.):  < or = to 10 pounds       No Pushing        No Climbing     No Reaching Above Shoulders       Date of Next Visit:  10/1/2024@8:30 AM Date of this Exam: 9/3/2024 Physician/Chiropractic Physician Name: Wolfgang Main D.O. Physician/Chiropractic Physician Signature:  Wolfgang Main DO MPH                                                                                                                                                                                                            D-39 (Rev. 2/24)

## 2024-09-05 DIAGNOSIS — F32.2 CURRENT SEVERE EPISODE OF MAJOR DEPRESSIVE DISORDER WITHOUT PSYCHOTIC FEATURES WITHOUT PRIOR EPISODE (HCC): ICD-10-CM

## 2024-09-05 RX ORDER — BUPROPION HYDROCHLORIDE 150 MG/1
300 TABLET ORAL EVERY MORNING
Qty: 180 TABLET | Refills: 0 | Status: SHIPPED | OUTPATIENT
Start: 2024-09-05

## 2024-09-05 NOTE — TELEPHONE ENCOUNTER
Received request via: Pharmacy    Was the patient seen in the last year in this department? Yes    Does the patient have an active prescription (recently filled or refills available) for medication(s) requested? No    Pharmacy Name: [facility] [place], [place] - [address]     Does the patient have halfway Plus and need 100-day supply? (This applies to ALL medications) Patient does not have SCP

## 2024-09-11 ENCOUNTER — OCCUPATIONAL MEDICINE (OUTPATIENT)
Dept: OCCUPATIONAL MEDICINE | Facility: CLINIC | Age: 54
End: 2024-09-11
Payer: COMMERCIAL

## 2024-09-11 VITALS
BODY MASS INDEX: 31.1 KG/M2 | RESPIRATION RATE: 16 BRPM | HEART RATE: 89 BPM | OXYGEN SATURATION: 96 % | WEIGHT: 169 LBS | TEMPERATURE: 97 F | DIASTOLIC BLOOD PRESSURE: 72 MMHG | SYSTOLIC BLOOD PRESSURE: 120 MMHG | HEIGHT: 62 IN

## 2024-09-11 DIAGNOSIS — G56.03 BILATERAL CARPAL TUNNEL SYNDROME: ICD-10-CM

## 2024-09-11 PROCEDURE — 3074F SYST BP LT 130 MM HG: CPT | Performed by: PREVENTIVE MEDICINE

## 2024-09-11 PROCEDURE — 3078F DIAST BP <80 MM HG: CPT | Performed by: PREVENTIVE MEDICINE

## 2024-09-11 PROCEDURE — 99213 OFFICE O/P EST LOW 20 MIN: CPT | Performed by: PREVENTIVE MEDICINE

## 2024-09-11 PROCEDURE — 1125F AMNT PAIN NOTED PAIN PRSNT: CPT | Performed by: PREVENTIVE MEDICINE

## 2024-09-11 ASSESSMENT — FIBROSIS 4 INDEX: FIB4 SCORE: 1.11

## 2024-09-11 ASSESSMENT — PAIN SCALES - GENERAL: PAINLEVEL: 9=SEVERE PAIN

## 2024-09-11 NOTE — LETTER
PHYSICIAN’S AND CHIROPRACTIC PHYSICIAN'S   PROGRESS REPORT CERTIFICATION OF DISABILITY Claim Number:     Social Security Number:    Patient’s Name: Sanna Yuen Date of Injury: 5/20/2024   Employer: [employer] Name of O (if applicable):      Patient’s Job Description/Occupation: caitlin       Previous Injuries/Diseases/Surgeries Contributing to the Condition:         Diagnosis: (G56.03) Bilateral carpal tunnel syndrome      Related to the Industrial Injury? Yes     Explain:        Objective Medical Findings:   Bilateral wrist/hands: Hyperthenar atrophy worse on the right than left.  Ganglion cyst palpated at the right ventral aspect of the wrist.  Full range of motion of wrist and digits with some discomfort.  Minimal tenderness throughout both wrists.  Phalen's positive.  4/5  strength.  4/5 strength throughout the right wrist. 4+/5 strength throughout the left wrist.            None - Discharged                         Stable  No                 Ratable  No        Generally Improved                         Condition Worsened               X   Condition Same  May Have Suffered a Permanent Disability No     Treatment Plan:    Referral for EMG/NCV bilateral upper extremities, pending approval  OTC ibuprofen/Tylenol  Use wrist brace at night, optional during the day  Referral for hand therapy, pending approval           No Change in Therapy                  PT/OT Prescribed                      Medication May be Used While Working        Case Management                          PT/OT Discontinued    Consultation    Further Diagnostic Studies:    Prescription(s)                 Released to FULL DUTY /No Restrictions on (Date):  From:      Certified TOTALLY TEMPORARILY DISABLED (Indicate Dates) From:   To:    X  Released to RESTRICTED/Modified Duty on (Date): From: 9/11/2024 To: 10/1/2024  Restrictions Are:         No Sitting    No Standing    No Pulling Other: No lift/push/pull greater than 10 pounds.  Limit  repetitive use of the hands to less than 4 hours per shift       No Bending at Waist     No Stooping     No Lifting        No Carrying     No Walking Lifting Restricted to (lbs.):          No Pushing        No Climbing     No Reaching Above Shoulders       Date of Next Visit:  10/1/2024  @ 8:30 AM Date of this Exam: 9/11/2024 Physician/Chiropractic Physician Name: Wolfgang Main D.O. Physician/Chiropractic Physician Signature:  Wolfgang Main DO MPH                                                                                                                                                                                                            D-39 (Rev. 2/24)

## 2024-09-11 NOTE — PROGRESS NOTES
"Subjective:     Sanna Yuen is a 53 y.o. female who presents for Follow-Up (Follow up ([facility] DOI MAY 2021 BOTH HANDS WRIST// Worst) Rm 17)    DOI: 5/1/2021  VIMAL: Patient has developed bilateral hand swelling and hand weakness and frequently drops objects. She has difficulty performing fine motor tasks such as writing or opening a food wrapper. She feels that these symptoms are secondary to repetitive movement through work.  She was seen twice in urgent care, advised wrist brace and work restrictions.     9/3/2024: Patient states that overall most of the symptoms are slightly worse in the left hand.  She states is more pain there.  She gets occasional numbness and tingling.  However her biggest symptom is weakness in both hands.  She states the weakness is little bit worse in the right hand.  She states that she has trouble gripping and grasping items and often drops things.  She has not noted much improvement with wrist bracing.  She states has been ongoing for many years now.  She has worked as a  for many years and before was working as .    9/11/2024: Patient states all symptoms same.  Still has not heard anything from the work comp insurance yet.  Presents with FMLA paperwork    ROS    SOCHX: Works as a  at [employer]  FH: No pertinent family history to this problem.       Objective:     /72 (BP Location: Left arm, Patient Position: Sitting, BP Cuff Size: Adult)   Pulse 89   Temp 36.1 °C (97 °F)   Resp 16   Ht 1.575 m (5' 2\")   Wt 76.7 kg (169 lb)   LMP  (LMP Unknown)   SpO2 96%   BMI 30.91 kg/m²     Constitutional: Patient is in no acute distress. Appears well-developed and well-nourished.   Cardiovascular: Normal rate.    Pulmonary/Chest: Effort normal. No respiratory distress.   Neurological: Patient is alert and oriented to person, place, and time.   Skin: Skin is warm and dry.   Psychiatric: Normal mood and affect. Behavior is normal.       Bilateral " wrist/hands: Hyperthenar atrophy worse on the right than left.  Ganglion cyst palpated at the right ventral aspect of the wrist.  Full range of motion of wrist and digits with some discomfort.  Minimal tenderness throughout both wrists.  Phalen's positive.  4/5  strength.  4/5 strength throughout the right wrist. 4+/5 strength throughout the left wrist.       Assessment/Plan:     1. Bilateral carpal tunnel syndrome    Filled out [place] paperwork with the patient  Referral for EMG/NCV bilateral upper extremities, pending approval  OTC ibuprofen/Tylenol  Use wrist brace at night, optional during the day  Referral for hand therapy, pending approval      Work Status: Restricted Duty - see D-39 or other state/federal worker's compensation forms for specific restrictions if applicable  Follow up 3 weeks    Differential diagnosis, natural history, supportive care, and indications for immediate follow-up discussed.    Approximately 22 minutes were spent in reviewing notes, preparing for visit, obtaining history, exam and evaluation, patient counseling/education, and post visit documentation/orders. Significant time was spent completing state/federal worker's compensation forms.

## 2024-09-17 ENCOUNTER — APPOINTMENT (OUTPATIENT)
Dept: BEHAVIORAL HEALTH | Facility: CLINIC | Age: 54
End: 2024-09-17
Payer: COMMERCIAL

## 2024-09-19 ENCOUNTER — OFFICE VISIT (OUTPATIENT)
Dept: BEHAVIORAL HEALTH | Facility: CLINIC | Age: 54
End: 2024-09-19
Payer: COMMERCIAL

## 2024-09-19 DIAGNOSIS — F43.10 POST TRAUMATIC STRESS DISORDER (PTSD): ICD-10-CM

## 2024-09-19 DIAGNOSIS — F33.2 SEVERE EPISODE OF RECURRENT MAJOR DEPRESSIVE DISORDER, WITHOUT PSYCHOTIC FEATURES (HCC): ICD-10-CM

## 2024-09-19 PROCEDURE — 90837 PSYTX W PT 60 MINUTES: CPT | Performed by: SOCIAL WORKER
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
## 2024-10-01 ENCOUNTER — OCCUPATIONAL MEDICINE (OUTPATIENT)
Dept: OCCUPATIONAL MEDICINE | Facility: CLINIC | Age: 54
End: 2024-10-01
Payer: COMMERCIAL

## 2024-10-01 VITALS
TEMPERATURE: 97.6 F | HEIGHT: 62 IN | RESPIRATION RATE: 14 BRPM | DIASTOLIC BLOOD PRESSURE: 76 MMHG | HEART RATE: 90 BPM | SYSTOLIC BLOOD PRESSURE: 112 MMHG | BODY MASS INDEX: 30.55 KG/M2 | OXYGEN SATURATION: 98 % | WEIGHT: 166 LBS

## 2024-10-01 DIAGNOSIS — G56.03 BILATERAL CARPAL TUNNEL SYNDROME: ICD-10-CM

## 2024-10-01 PROCEDURE — 3074F SYST BP LT 130 MM HG: CPT | Performed by: PREVENTIVE MEDICINE

## 2024-10-01 PROCEDURE — 99213 OFFICE O/P EST LOW 20 MIN: CPT | Performed by: PREVENTIVE MEDICINE

## 2024-10-01 PROCEDURE — 3078F DIAST BP <80 MM HG: CPT | Performed by: PREVENTIVE MEDICINE

## 2024-10-01 ASSESSMENT — FIBROSIS 4 INDEX: FIB4 SCORE: 1.11

## 2024-10-05 DIAGNOSIS — F32.2 CURRENT SEVERE EPISODE OF MAJOR DEPRESSIVE DISORDER WITHOUT PSYCHOTIC FEATURES WITHOUT PRIOR EPISODE (HCC): ICD-10-CM

## 2024-10-07 ENCOUNTER — OFFICE VISIT (OUTPATIENT)
Dept: BEHAVIORAL HEALTH | Facility: CLINIC | Age: 54
End: 2024-10-07
Payer: COMMERCIAL

## 2024-10-07 DIAGNOSIS — F33.2 SEVERE EPISODE OF RECURRENT MAJOR DEPRESSIVE DISORDER, WITHOUT PSYCHOTIC FEATURES (HCC): ICD-10-CM

## 2024-10-07 DIAGNOSIS — F43.10 POST TRAUMATIC STRESS DISORDER (PTSD): ICD-10-CM

## 2024-10-07 PROCEDURE — 90837 PSYTX W PT 60 MINUTES: CPT | Performed by: SOCIAL WORKER

## 2024-10-08 RX ORDER — DULOXETIN HYDROCHLORIDE 30 MG/1
30 CAPSULE, DELAYED RELEASE ORAL DAILY
Qty: 90 CAPSULE | Refills: 1 | Status: SHIPPED | OUTPATIENT
Start: 2024-10-08

## 2024-10-21 ENCOUNTER — APPOINTMENT (OUTPATIENT)
Dept: MEDICAL GROUP | Facility: PHYSICIAN GROUP | Age: 54
End: 2024-10-21
Payer: COMMERCIAL

## 2024-10-22 ENCOUNTER — APPOINTMENT (OUTPATIENT)
Dept: MEDICAL GROUP | Facility: PHYSICIAN GROUP | Age: 54
End: 2024-10-22
Payer: COMMERCIAL

## 2024-11-07 ENCOUNTER — TELEPHONE (OUTPATIENT)
Dept: MEDICAL GROUP | Facility: PHYSICIAN GROUP | Age: 54
End: 2024-11-07
Payer: COMMERCIAL

## 2024-11-07 DIAGNOSIS — E11.65 TYPE 2 DIABETES MELLITUS WITH HYPERGLYCEMIA, WITHOUT LONG-TERM CURRENT USE OF INSULIN (HCC): Chronic | ICD-10-CM

## 2024-11-07 RX ORDER — PIOGLITAZONE 30 MG/1
30 TABLET ORAL DAILY
Qty: 90 TABLET | Refills: 3 | Status: CANCELLED | OUTPATIENT
Start: 2024-11-07

## 2024-11-07 NOTE — TELEPHONE ENCOUNTER
DOCUMENTATION OF PAR STATUS:    1. Name of Medication & Dose:  Pioglitazone HCl 30MG tablets    2. Name of Prescription Coverage Company & phone #: [employer]     3. Date Prior Auth Submitted: 11/07/2024    4. What information was given to obtain insurance decision? ICD10 Notes    5. Prior Auth Status? Pending    6. Patient Notified: N\A

## 2024-11-07 NOTE — TELEPHONE ENCOUNTER
FINAL PRIOR AUTHORIZATION STATUS:    1.  Name of Medication & Dose: pioglitazone (ACTOS) 30 MG Tab      2. Prior Auth Status: Approved through 11/7/2025     3. Action Taken: Pharmacy Notified: N\A Patient Notified: yes

## 2024-11-12 ENCOUNTER — OCCUPATIONAL MEDICINE (OUTPATIENT)
Dept: OCCUPATIONAL MEDICINE | Facility: CLINIC | Age: 54
End: 2024-11-12
Payer: COMMERCIAL

## 2024-11-12 ENCOUNTER — OFFICE VISIT (OUTPATIENT)
Dept: BEHAVIORAL HEALTH | Facility: CLINIC | Age: 54
End: 2024-11-12
Payer: MEDICAID

## 2024-11-12 VITALS
HEART RATE: 84 BPM | DIASTOLIC BLOOD PRESSURE: 60 MMHG | TEMPERATURE: 97.5 F | SYSTOLIC BLOOD PRESSURE: 100 MMHG | RESPIRATION RATE: 16 BRPM | HEIGHT: 62 IN | BODY MASS INDEX: 31.47 KG/M2 | WEIGHT: 171 LBS

## 2024-11-12 DIAGNOSIS — G56.03 BILATERAL CARPAL TUNNEL SYNDROME: ICD-10-CM

## 2024-11-12 DIAGNOSIS — F33.2 SEVERE EPISODE OF RECURRENT MAJOR DEPRESSIVE DISORDER, WITHOUT PSYCHOTIC FEATURES (HCC): ICD-10-CM

## 2024-11-12 DIAGNOSIS — F41.1 GAD (GENERALIZED ANXIETY DISORDER): ICD-10-CM

## 2024-11-12 DIAGNOSIS — E11.65 TYPE 2 DIABETES MELLITUS WITH HYPERGLYCEMIA, WITHOUT LONG-TERM CURRENT USE OF INSULIN (HCC): Chronic | ICD-10-CM

## 2024-11-12 PROBLEM — F32.2 CURRENT SEVERE EPISODE OF MAJOR DEPRESSIVE DISORDER WITHOUT PSYCHOTIC FEATURES WITHOUT PRIOR EPISODE (HCC): Status: RESOLVED | Noted: 2024-06-11 | Resolved: 2024-11-12

## 2024-11-12 PROCEDURE — 99213 OFFICE O/P EST LOW 20 MIN: CPT | Performed by: PREVENTIVE MEDICINE

## 2024-11-12 PROCEDURE — 99205 OFFICE O/P NEW HI 60 MIN: CPT | Performed by: PSYCHIATRY & NEUROLOGY

## 2024-11-12 RX ORDER — SEMAGLUTIDE 2.68 MG/ML
2 INJECTION, SOLUTION SUBCUTANEOUS
Qty: 9 ML | Refills: 1 | Status: SHIPPED | OUTPATIENT
Start: 2024-11-12 | End: 2024-11-19 | Stop reason: SDUPTHER

## 2024-11-12 RX ORDER — BUPROPION HYDROCHLORIDE 300 MG/1
300 TABLET ORAL EVERY MORNING
Qty: 30 TABLET | Refills: 1 | Status: SHIPPED | OUTPATIENT
Start: 2024-11-12

## 2024-11-12 RX ORDER — DULOXETIN HYDROCHLORIDE 60 MG/1
60 CAPSULE, DELAYED RELEASE ORAL DAILY
Qty: 30 CAPSULE | Refills: 1 | Status: SHIPPED | OUTPATIENT
Start: 2024-11-12

## 2024-11-12 RX ORDER — HYDROXYZINE PAMOATE 25 MG/1
25 CAPSULE ORAL 2 TIMES DAILY PRN
Qty: 60 CAPSULE | Refills: 1 | Status: SHIPPED | OUTPATIENT
Start: 2024-11-12

## 2024-11-12 RX ORDER — TRAZODONE HYDROCHLORIDE 50 MG/1
50 TABLET, FILM COATED ORAL NIGHTLY PRN
Qty: 60 TABLET | Refills: 1 | Status: SHIPPED | OUTPATIENT
Start: 2024-11-12

## 2024-11-12 ASSESSMENT — PAIN SCALES - GENERAL: PAINLEVEL_OUTOF10: 9=SEVERE PAIN

## 2024-11-12 ASSESSMENT — FIBROSIS 4 INDEX: FIB4 SCORE: 1.13
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
## 2024-11-12 NOTE — PROGRESS NOTES
Subjective:     Sanna Yuen is a 54 y.o. female who presents for Follow-Up (([facility] DOI MAY 2021 BOTH HANDS WRIST,worse) RM 17)    DOI: 5/1/2021  VIMAL: Patient has developed bilateral hand swelling and hand weakness and frequently drops objects. She has difficulty performing fine motor tasks such as writing or opening a food wrapper. She feels that these symptoms are secondary to repetitive movement through work.  She was seen twice in urgent care, advised wrist brace and work restrictions.     9/3/2024: Patient states that overall most of the symptoms are slightly worse in the left hand.  She states is more pain there.  She gets occasional numbness and tingling.  However her biggest symptom is weakness in both hands.  She states the weakness is little bit worse in the right hand.  She states that she has trouble gripping and grasping items and often drops things.  She has not noted much improvement with wrist bracing.  She states has been ongoing for many years now.  She has worked as a  for many years and before was working as .9     10/1/2024: Patient states that overall symptoms in the hand are about the same continues have significant numbness and tingling both hands.  Has more weakness on the right side.  Has not heard anything from the work comp insurance.  She is unsure who her tPA is.     11/12/2024: Patient states that overall symptoms are pretty much the same.  She states that she received paperwork from her tPA stating that her claim is in process.  She has not heard anything in regards to referrals for physical therapy or EMG being approved.  They have not been accommodating work restrictions and is not working at this point.    ROS    SOCHX: Works as a  at [employer]  FH: No pertinent family history to this problem.       Objective:     /60 (BP Location: Right arm, Patient Position: Sitting, BP Cuff Size: Adult)   Pulse 84   Temp 36.4 °C (97.5 °F)    "Resp 16   Ht 1.575 m (5' 2\")   Wt 77.6 kg (171 lb)   LMP  (LMP Unknown)   BMI 31.28 kg/m²     Constitutional: Patient is in no acute distress. Appears well-developed and well-nourished.   Cardiovascular: Normal rate.    Pulmonary/Chest: Effort normal. No respiratory distress.   Neurological: Patient is alert and oriented to person, place, and time.   Skin: Skin is warm and dry.   Psychiatric: Normal mood and affect. Behavior is normal.     Bilateral wrist/hands: Hyperthenar atrophy worse on the right than left.  Ganglion cyst palpated at the right ventral aspect of the wrist.  Full range of motion of wrist and digits with some discomfort.  Minimal tenderness throughout both wrists.  Phalen's positive.  4/5  strength.  4/5 strength throughout the right wrist. 4+/5 strength throughout the left wrist.    Assessment/Plan:     1. Bilateral carpal tunnel syndrome  - Referral to Neurodiagnostics (EEG,EP,EMG/NCS/DBS)  - Referral to Hand Therapy      Referral for EMG/NCV bilateral upper extremities, placed new referral  Referral for hand therapy, placed new referral  OTC ibuprofen/Tylenol  Use wrist brace at night        Work Status: Restricted Duty - see D-39 or other state/federal worker's compensation forms for specific restrictions if applicable  Follow up 4 weeks    Differential diagnosis, natural history, supportive care, and indications for immediate follow-up discussed.    Approximately 20 minutes were spent in reviewing notes, preparing for visit, obtaining history, exam and evaluation, patient counseling/education, and post visit documentation/orders. Significant time was spent completing state/federal worker's compensation forms.    "

## 2024-11-13 ENCOUNTER — TELEPHONE (OUTPATIENT)
Dept: BEHAVIORAL HEALTH | Facility: CLINIC | Age: 54
End: 2024-11-13
Payer: COMMERCIAL

## 2024-11-13 DIAGNOSIS — F33.2 SEVERE EPISODE OF RECURRENT MAJOR DEPRESSIVE DISORDER, WITHOUT PSYCHOTIC FEATURES (HCC): Primary | ICD-10-CM

## 2024-11-13 NOTE — TELEPHONE ENCOUNTER
Phone Number Called: [phone]    Call outcome: Spoke to patient regarding message below.    Message: Called pt to inform her lab order was entered she can go to any [facility] lab and they should have it in their records.Suggested to scheduled appt first via [employer] or phone pt understood/agreed

## 2024-11-13 NOTE — TELEPHONE ENCOUNTER
Caller Name: Sanna   Call Back Number: [phone]    How would the patient prefer to be contacted with a response: Phone call OK to leave a detailed message    Pt called asking about a lab order that was discussed during initial appt with you on 11/12/2024 can you send that over or further advise thank you

## 2024-11-14 ENCOUNTER — HOSPITAL ENCOUNTER (OUTPATIENT)
Dept: LAB | Facility: MEDICAL CENTER | Age: 54
End: 2024-11-14
Attending: PSYCHIATRY & NEUROLOGY
Payer: MEDICAID

## 2024-11-14 DIAGNOSIS — F33.2 SEVERE EPISODE OF RECURRENT MAJOR DEPRESSIVE DISORDER, WITHOUT PSYCHOTIC FEATURES (HCC): ICD-10-CM

## 2024-11-14 LAB
BASOPHILS # BLD AUTO: 1.4 % (ref 0–1.8)
BASOPHILS # BLD: 0.09 K/UL (ref 0–0.12)
EOSINOPHIL # BLD AUTO: 0.22 K/UL (ref 0–0.51)
EOSINOPHIL NFR BLD: 3.5 % (ref 0–6.9)
ERYTHROCYTE [DISTWIDTH] IN BLOOD BY AUTOMATED COUNT: 51.8 FL (ref 35.9–50)
HCT VFR BLD AUTO: 47.3 % (ref 37–47)
HGB BLD-MCNC: 14.5 G/DL (ref 12–16)
IMM GRANULOCYTES # BLD AUTO: 0.04 K/UL (ref 0–0.11)
IMM GRANULOCYTES NFR BLD AUTO: 0.6 % (ref 0–0.9)
LYMPHOCYTES # BLD AUTO: 2.03 K/UL (ref 1–4.8)
LYMPHOCYTES NFR BLD: 32.1 % (ref 22–41)
MCH RBC QN AUTO: 29.2 PG (ref 27–33)
MCHC RBC AUTO-ENTMCNC: 30.7 G/DL (ref 32.2–35.5)
MCV RBC AUTO: 95.4 FL (ref 81.4–97.8)
MONOCYTES # BLD AUTO: 0.57 K/UL (ref 0–0.85)
MONOCYTES NFR BLD AUTO: 9 % (ref 0–13.4)
NEUTROPHILS # BLD AUTO: 3.38 K/UL (ref 1.82–7.42)
NEUTROPHILS NFR BLD: 53.4 % (ref 44–72)
NRBC # BLD AUTO: 0 K/UL
NRBC BLD-RTO: 0 /100 WBC (ref 0–0.2)
PLATELET # BLD AUTO: 252 K/UL (ref 164–446)
PMV BLD AUTO: 11.9 FL (ref 9–12.9)
RBC # BLD AUTO: 4.96 M/UL (ref 4.2–5.4)
WBC # BLD AUTO: 6.3 K/UL (ref 4.8–10.8)

## 2024-11-14 PROCEDURE — 84443 ASSAY THYROID STIM HORMONE: CPT

## 2024-11-14 PROCEDURE — 85025 COMPLETE CBC W/AUTO DIFF WBC: CPT

## 2024-11-14 PROCEDURE — 36415 COLL VENOUS BLD VENIPUNCTURE: CPT

## 2024-11-14 PROCEDURE — 80307 DRUG TEST PRSMV CHEM ANLYZR: CPT

## 2024-11-14 PROCEDURE — G0480 DRUG TEST DEF 1-7 CLASSES: HCPCS

## 2024-11-14 PROCEDURE — 80076 HEPATIC FUNCTION PANEL: CPT

## 2024-11-14 PROCEDURE — 80048 BASIC METABOLIC PNL TOTAL CA: CPT

## 2024-11-15 LAB
ALBUMIN SERPL BCP-MCNC: 4.2 G/DL (ref 3.2–4.9)
ALP SERPL-CCNC: 40 U/L (ref 30–99)
ALT SERPL-CCNC: 19 U/L (ref 2–50)
ANION GAP SERPL CALC-SCNC: 11 MMOL/L (ref 7–16)
AST SERPL-CCNC: 17 U/L (ref 12–45)
BILIRUB CONJ SERPL-MCNC: <0.2 MG/DL (ref 0.1–0.5)
BILIRUB INDIRECT SERPL-MCNC: NORMAL MG/DL (ref 0–1)
BILIRUB SERPL-MCNC: 0.2 MG/DL (ref 0.1–1.5)
BUN SERPL-MCNC: 24 MG/DL (ref 8–22)
CALCIUM SERPL-MCNC: 10 MG/DL (ref 8.5–10.5)
CHLORIDE SERPL-SCNC: 103 MMOL/L (ref 96–112)
CO2 SERPL-SCNC: 27 MMOL/L (ref 20–33)
CREAT SERPL-MCNC: 0.71 MG/DL (ref 0.5–1.4)
GFR SERPLBLD CREATININE-BSD FMLA CKD-EPI: 100 ML/MIN/1.73 M 2
GLUCOSE SERPL-MCNC: 87 MG/DL (ref 65–99)
POTASSIUM SERPL-SCNC: 4.9 MMOL/L (ref 3.6–5.5)
PROT SERPL-MCNC: 6.8 G/DL (ref 6–8.2)
SODIUM SERPL-SCNC: 141 MMOL/L (ref 135–145)
TSH SERPL DL<=0.005 MIU/L-ACNC: 2.23 UIU/ML (ref 0.38–5.33)

## 2024-11-16 LAB
AMPHET CTO UR CFM-MCNC: NORMAL NG/ML
BARBITURATES CTO UR CFM-MCNC: NEGATIVE NG/ML
BENZODIAZ CTO UR CFM-MCNC: NEGATIVE NG/ML
CANNABINOIDS CTO UR CFM-MCNC: NEGATIVE NG/ML
COCAINE CTO UR CFM-MCNC: NEGATIVE NG/ML
CREAT UR-MCNC: 106.8 MG/DL (ref 20–400)
DRUG COMMENT 753798: NORMAL
METHADONE CTO UR CFM-MCNC: NEGATIVE NG/ML
OPIATES CTO UR CFM-MCNC: NEGATIVE NG/ML
PCP CTO UR CFM-MCNC: NEGATIVE NG/ML
PROPOXYPH CTO UR CFM-MCNC: NEGATIVE NG/ML

## 2024-11-18 ENCOUNTER — APPOINTMENT (OUTPATIENT)
Dept: BEHAVIORAL HEALTH | Facility: CLINIC | Age: 54
End: 2024-11-18
Payer: MEDICAID

## 2024-11-18 ENCOUNTER — TELEPHONE (OUTPATIENT)
Dept: MEDICAL GROUP | Facility: PHYSICIAN GROUP | Age: 54
End: 2024-11-18
Payer: MEDICAID

## 2024-11-18 LAB
AMPHET UR CFM-MCNC: <50 NG/ML
MDA UR CFM-MCNC: <200 NG/ML
MDEA UR CFM-MCNC: <200 NG/ML
MDMA UR CFM-MCNC: <200 NG/ML
METHAMPHET UR CFM-MCNC: <200 NG/ML
PHENTERMINE UR CFM-MCNC: <200 NG/ML

## 2024-11-18 NOTE — TELEPHONE ENCOUNTER
DOCUMENTATION OF PAR STATUS:    1. Name of Medication & Dose: Ozempic (2 MG/DOSE) 8MG/3ML pen-injectors     2. Name of Prescription Coverage Company & phone #: [employer]    3. Date Prior Auth Submitted: 11/18/2024    4. What information was given to obtain insurance decision? officenotes    5. Prior Auth Status? Pending    6. Patient Notified: N\A

## 2024-11-19 ENCOUNTER — OFFICE VISIT (OUTPATIENT)
Dept: BEHAVIORAL HEALTH | Facility: CLINIC | Age: 54
End: 2024-11-19
Payer: MEDICAID

## 2024-11-19 ENCOUNTER — TELEPHONE (OUTPATIENT)
Dept: MEDICAL GROUP | Facility: PHYSICIAN GROUP | Age: 54
End: 2024-11-19
Payer: MEDICAID

## 2024-11-19 DIAGNOSIS — E11.65 TYPE 2 DIABETES MELLITUS WITH HYPERGLYCEMIA, WITHOUT LONG-TERM CURRENT USE OF INSULIN (HCC): Chronic | ICD-10-CM

## 2024-11-19 DIAGNOSIS — F43.10 POST TRAUMATIC STRESS DISORDER (PTSD): ICD-10-CM

## 2024-11-19 DIAGNOSIS — F33.2 SEVERE EPISODE OF RECURRENT MAJOR DEPRESSIVE DISORDER, WITHOUT PSYCHOTIC FEATURES (HCC): ICD-10-CM

## 2024-11-19 PROCEDURE — 90837 PSYTX W PT 60 MINUTES: CPT | Performed by: SOCIAL WORKER

## 2024-11-19 RX ORDER — SEMAGLUTIDE 2.68 MG/ML
2 INJECTION, SOLUTION SUBCUTANEOUS
Qty: 2 ML | Refills: 5 | Status: SHIPPED | OUTPATIENT
Start: 2024-11-19

## 2024-11-20 NOTE — TELEPHONE ENCOUNTER
FINAL PRIOR AUTHORIZATION STATUS:    1.  Name of Medication & Dose:     Semaglutide, 2 MG/DOSE, (OZEMPIC, 2 MG/DOSE,) 8 MG/3ML Solution Pen-injector        2. Prior Auth Status: Denied.  Reason: SEE DENIAL IN MEDIA    3. Action Taken: Pharmacy Notified: N\A Patient Notified: N\A
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
## 2024-11-22 ENCOUNTER — APPOINTMENT (OUTPATIENT)
Dept: MEDICAL GROUP | Facility: PHYSICIAN GROUP | Age: 54
End: 2024-11-22
Payer: MEDICAID

## 2024-12-27 ENCOUNTER — TELEPHONE (OUTPATIENT)
Dept: MEDICAL GROUP | Facility: MEDICAL CENTER | Age: 54
End: 2024-12-27
Payer: MEDICAID

## 2024-12-27 ENCOUNTER — TELEPHONE (OUTPATIENT)
Dept: MEDICAL GROUP | Facility: PHYSICIAN GROUP | Age: 54
End: 2024-12-27
Payer: MEDICAID

## 2024-12-27 NOTE — TELEPHONE ENCOUNTER
DOCUMENTATION OF PAR STATUS:    1. Name of Medication & Dose: Fenofibrate 145MG tablets     2. Name of Prescription Coverage Company & phone #: [email or web address]     3. Date Prior Auth Submitted: [id number]    4. What information was given to obtain insurance decision? ICD    5. Prior Auth Status? Pending    6. Patient Notified: N\A

## 2024-12-28 NOTE — TELEPHONE ENCOUNTER
FINAL PRIOR AUTHORIZATION STATUS:    1.  Name of Medication & Dose: FENOFIBRATE     2. Prior Auth Status: Approved through 12/27/25     3. Action Taken: Pharmacy Notified: N\A Patient Notified: N\A

## 2024-12-30 ENCOUNTER — HOSPITAL ENCOUNTER (OUTPATIENT)
Dept: RADIOLOGY | Facility: MEDICAL CENTER | Age: 54
End: 2024-12-30
Payer: MEDICAID

## 2024-12-30 DIAGNOSIS — R22.31 LUMP OF SKIN OF UPPER EXTREMITY, RIGHT: ICD-10-CM

## 2024-12-30 PROCEDURE — 76882 US LMTD JT/FCL EVL NVASC XTR: CPT | Mod: RT

## 2025-01-07 DIAGNOSIS — F41.1 GAD (GENERALIZED ANXIETY DISORDER): ICD-10-CM

## 2025-01-07 DIAGNOSIS — F33.2 SEVERE EPISODE OF RECURRENT MAJOR DEPRESSIVE DISORDER, WITHOUT PSYCHOTIC FEATURES (HCC): ICD-10-CM

## 2025-01-07 RX ORDER — HYDROXYZINE PAMOATE 25 MG/1
25 CAPSULE ORAL 2 TIMES DAILY PRN
Qty: 60 CAPSULE | Refills: 0 | Status: SHIPPED | OUTPATIENT
Start: 2025-01-07 | End: 2025-01-16 | Stop reason: SDUPTHER

## 2025-01-07 RX ORDER — DULOXETIN HYDROCHLORIDE 60 MG/1
60 CAPSULE, DELAYED RELEASE ORAL DAILY
Qty: 30 CAPSULE | Refills: 0 | Status: SHIPPED | OUTPATIENT
Start: 2025-01-07 | End: 2025-01-16 | Stop reason: SDUPTHER

## 2025-01-07 RX ORDER — BUPROPION HYDROCHLORIDE 300 MG/1
300 TABLET ORAL EVERY MORNING
Qty: 30 TABLET | Refills: 0 | Status: SHIPPED | OUTPATIENT
Start: 2025-01-07 | End: 2025-01-16 | Stop reason: SDUPTHER

## 2025-01-09 ENCOUNTER — OFFICE VISIT (OUTPATIENT)
Dept: BEHAVIORAL HEALTH | Facility: CLINIC | Age: 55
End: 2025-01-09
Payer: MEDICAID

## 2025-01-09 DIAGNOSIS — F43.10 POST TRAUMATIC STRESS DISORDER (PTSD): ICD-10-CM

## 2025-01-09 DIAGNOSIS — F33.2 SEVERE EPISODE OF RECURRENT MAJOR DEPRESSIVE DISORDER, WITHOUT PSYCHOTIC FEATURES (HCC): ICD-10-CM

## 2025-01-09 PROCEDURE — 90834 PSYTX W PT 45 MINUTES: CPT | Performed by: SOCIAL WORKER
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
## 2025-01-16 ENCOUNTER — APPOINTMENT (OUTPATIENT)
Dept: BEHAVIORAL HEALTH | Facility: CLINIC | Age: 55
End: 2025-01-16

## 2025-01-16 DIAGNOSIS — F43.10 POST TRAUMATIC STRESS DISORDER (PTSD): ICD-10-CM

## 2025-01-16 DIAGNOSIS — F41.1 GAD (GENERALIZED ANXIETY DISORDER): ICD-10-CM

## 2025-01-16 DIAGNOSIS — F33.2 SEVERE EPISODE OF RECURRENT MAJOR DEPRESSIVE DISORDER, WITHOUT PSYCHOTIC FEATURES (HCC): ICD-10-CM

## 2025-01-16 PROCEDURE — G2211 COMPLEX E/M VISIT ADD ON: HCPCS | Performed by: PSYCHIATRY & NEUROLOGY

## 2025-01-16 PROCEDURE — 99214 OFFICE O/P EST MOD 30 MIN: CPT | Performed by: PSYCHIATRY & NEUROLOGY

## 2025-01-16 RX ORDER — DULOXETIN HYDROCHLORIDE 60 MG/1
60 CAPSULE, DELAYED RELEASE ORAL DAILY
Qty: 30 CAPSULE | Refills: 0 | Status: SHIPPED | OUTPATIENT
Start: 2025-01-16

## 2025-01-16 RX ORDER — HYDROXYZINE PAMOATE 25 MG/1
25 CAPSULE ORAL 2 TIMES DAILY PRN
Qty: 60 CAPSULE | Refills: 0 | Status: SHIPPED | OUTPATIENT
Start: 2025-01-16

## 2025-01-16 RX ORDER — PRAZOSIN HYDROCHLORIDE 1 MG/1
1 CAPSULE ORAL NIGHTLY
Qty: 90 CAPSULE | Refills: 1 | Status: SHIPPED | OUTPATIENT
Start: 2025-01-16

## 2025-01-16 RX ORDER — BUPROPION HYDROCHLORIDE 300 MG/1
300 TABLET ORAL EVERY MORNING
Qty: 30 TABLET | Refills: 0 | Status: SHIPPED | OUTPATIENT
Start: 2025-01-16
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
## 2025-01-22 PROBLEM — M65.342 TRIGGER RING FINGER OF LEFT HAND: Status: ACTIVE | Noted: 2025-01-22

## 2025-01-22 PROBLEM — M65.312 TRIGGER THUMB, LEFT THUMB: Status: ACTIVE | Noted: 2025-01-22

## 2025-01-22 PROBLEM — M65.322 TRIGGER FINGER, LEFT INDEX FINGER: Status: ACTIVE | Noted: 2025-01-22

## 2025-01-22 PROBLEM — G56.03 CARPAL TUNNEL SYNDROME, BILATERAL: Status: ACTIVE | Noted: 2025-01-22

## 2025-01-22 PROBLEM — M18.12 PRIMARY OSTEOARTHRITIS OF FIRST CARPOMETACARPAL JOINT OF LEFT HAND: Status: ACTIVE | Noted: 2025-01-22

## 2025-01-22 PROBLEM — M65.332 TRIGGER MIDDLE FINGER OF LEFT HAND: Status: ACTIVE | Noted: 2025-01-22

## 2025-01-22 PROBLEM — G56.00 CARPAL TUNNEL SYNDROME: Status: ACTIVE | Noted: 2025-01-22

## 2025-01-31 ENCOUNTER — DOCUMENTATION (OUTPATIENT)
Dept: BEHAVIORAL HEALTH | Facility: CLINIC | Age: 55
End: 2025-01-31
Payer: MEDICAID

## 2025-02-03 ENCOUNTER — APPOINTMENT (OUTPATIENT)
Dept: BEHAVIORAL HEALTH | Facility: CLINIC | Age: 55
End: 2025-02-03
Payer: MEDICAID

## 2025-02-03 DIAGNOSIS — F43.10 POST TRAUMATIC STRESS DISORDER (PTSD): ICD-10-CM

## 2025-02-03 DIAGNOSIS — F33.2 SEVERE EPISODE OF RECURRENT MAJOR DEPRESSIVE DISORDER, WITHOUT PSYCHOTIC FEATURES (HCC): ICD-10-CM

## 2025-02-03 PROCEDURE — 90834 PSYTX W PT 45 MINUTES: CPT | Performed by: SOCIAL WORKER
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
## 2025-02-04 DIAGNOSIS — F33.2 SEVERE EPISODE OF RECURRENT MAJOR DEPRESSIVE DISORDER, WITHOUT PSYCHOTIC FEATURES (HCC): ICD-10-CM

## 2025-02-04 DIAGNOSIS — F41.1 GAD (GENERALIZED ANXIETY DISORDER): ICD-10-CM

## 2025-02-04 DIAGNOSIS — F43.10 POST TRAUMATIC STRESS DISORDER (PTSD): ICD-10-CM

## 2025-02-04 RX ORDER — DULOXETIN HYDROCHLORIDE 60 MG/1
60 CAPSULE, DELAYED RELEASE ORAL DAILY
Qty: 30 CAPSULE | Refills: 0 | Status: SHIPPED | OUTPATIENT
Start: 2025-02-04 | End: 2025-02-18 | Stop reason: SDUPTHER

## 2025-02-04 RX ORDER — HYDROXYZINE PAMOATE 25 MG/1
CAPSULE ORAL
Qty: 60 CAPSULE | Refills: 0 | Status: SHIPPED | OUTPATIENT
Start: 2025-02-04 | End: 2025-02-18 | Stop reason: SDUPTHER

## 2025-02-12 ENCOUNTER — APPOINTMENT (OUTPATIENT)
Dept: BEHAVIORAL HEALTH | Facility: CLINIC | Age: 55
End: 2025-02-12
Payer: MEDICAID

## 2025-02-18 ENCOUNTER — OFFICE VISIT (OUTPATIENT)
Dept: BEHAVIORAL HEALTH | Facility: CLINIC | Age: 55
End: 2025-02-18
Payer: MEDICAID

## 2025-02-18 DIAGNOSIS — F41.1 GAD (GENERALIZED ANXIETY DISORDER): ICD-10-CM

## 2025-02-18 DIAGNOSIS — F43.10 POST TRAUMATIC STRESS DISORDER (PTSD): ICD-10-CM

## 2025-02-18 DIAGNOSIS — F33.2 SEVERE EPISODE OF RECURRENT MAJOR DEPRESSIVE DISORDER, WITHOUT PSYCHOTIC FEATURES (HCC): ICD-10-CM

## 2025-02-18 RX ORDER — DULOXETIN HYDROCHLORIDE 30 MG/1
30 CAPSULE, DELAYED RELEASE ORAL DAILY
Qty: 30 CAPSULE | Refills: 1 | Status: SHIPPED | OUTPATIENT
Start: 2025-02-18

## 2025-02-18 RX ORDER — PRAZOSIN HYDROCHLORIDE 2 MG/1
2 CAPSULE ORAL NIGHTLY
Qty: 30 CAPSULE | Refills: 3 | Status: SHIPPED | OUTPATIENT
Start: 2025-02-18

## 2025-02-18 RX ORDER — HYDROXYZINE PAMOATE 25 MG/1
25 CAPSULE ORAL 3 TIMES DAILY PRN
Qty: 90 CAPSULE | Refills: 1 | Status: SHIPPED | OUTPATIENT
Start: 2025-02-18

## 2025-02-18 RX ORDER — BUPROPION HYDROCHLORIDE 300 MG/1
300 TABLET ORAL EVERY MORNING
Qty: 30 TABLET | Refills: 0 | Status: SHIPPED | OUTPATIENT
Start: 2025-02-18

## 2025-02-18 RX ORDER — DULOXETIN HYDROCHLORIDE 60 MG/1
60 CAPSULE, DELAYED RELEASE ORAL DAILY
Qty: 30 CAPSULE | Refills: 0 | Status: SHIPPED | OUTPATIENT
Start: 2025-02-18
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
## 2025-02-20 ENCOUNTER — HOSPITAL ENCOUNTER (OUTPATIENT)
Dept: RADIOLOGY | Facility: MEDICAL CENTER | Age: 55
End: 2025-02-20
Payer: MEDICAID

## 2025-02-20 DIAGNOSIS — Z12.31 ENCOUNTER FOR SCREENING MAMMOGRAM FOR MALIGNANT NEOPLASM OF BREAST: ICD-10-CM

## 2025-02-20 PROCEDURE — 77067 SCR MAMMO BI INCL CAD: CPT

## 2025-02-26 ENCOUNTER — OFFICE VISIT (OUTPATIENT)
Dept: BEHAVIORAL HEALTH | Facility: CLINIC | Age: 55
End: 2025-02-26
Payer: MEDICAID

## 2025-02-26 DIAGNOSIS — F43.10 POST TRAUMATIC STRESS DISORDER (PTSD): ICD-10-CM
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
## 2025-03-03 ENCOUNTER — HOSPITAL ENCOUNTER (OUTPATIENT)
Dept: LAB | Facility: MEDICAL CENTER | Age: 55
End: 2025-03-03
Payer: MEDICAID

## 2025-03-03 LAB
25(OH)D3 SERPL-MCNC: 32 NG/ML (ref 30–100)
ALBUMIN SERPL BCP-MCNC: 4.5 G/DL (ref 3.2–4.9)
ALBUMIN/GLOB SERPL: 1.7 G/DL
ALP SERPL-CCNC: 61 U/L (ref 30–99)
ALT SERPL-CCNC: 129 U/L (ref 2–50)
ANION GAP SERPL CALC-SCNC: 15 MMOL/L (ref 7–16)
AST SERPL-CCNC: 52 U/L (ref 12–45)
BASOPHILS # BLD AUTO: 0.6 % (ref 0–1.8)
BASOPHILS # BLD: 0.07 K/UL (ref 0–0.12)
BILIRUB SERPL-MCNC: 0.3 MG/DL (ref 0.1–1.5)
BUN SERPL-MCNC: 14 MG/DL (ref 8–22)
CALCIUM ALBUM COR SERPL-MCNC: 9.9 MG/DL (ref 8.5–10.5)
CALCIUM SERPL-MCNC: 10.3 MG/DL (ref 8.5–10.5)
CHLORIDE SERPL-SCNC: 102 MMOL/L (ref 96–112)
CHOLEST SERPL-MCNC: 115 MG/DL (ref 100–199)
CO2 SERPL-SCNC: 23 MMOL/L (ref 20–33)
CREAT SERPL-MCNC: 0.74 MG/DL (ref 0.5–1.4)
EOSINOPHIL # BLD AUTO: 0.11 K/UL (ref 0–0.51)
EOSINOPHIL NFR BLD: 1 % (ref 0–6.9)
ERYTHROCYTE [DISTWIDTH] IN BLOOD BY AUTOMATED COUNT: 53.8 FL (ref 35.9–50)
FOLATE SERPL-MCNC: 26.3 NG/ML
GFR SERPLBLD CREATININE-BSD FMLA CKD-EPI: 95 ML/MIN/1.73 M 2
GLOBULIN SER CALC-MCNC: 2.6 G/DL (ref 1.9–3.5)
GLUCOSE SERPL-MCNC: 118 MG/DL (ref 65–99)
HCT VFR BLD AUTO: 49.7 % (ref 37–47)
HCV AB SER QL: NORMAL
HDLC SERPL-MCNC: 48 MG/DL
HGB BLD-MCNC: 15.6 G/DL (ref 12–16)
IMM GRANULOCYTES # BLD AUTO: 0.02 K/UL (ref 0–0.11)
IMM GRANULOCYTES NFR BLD AUTO: 0.2 % (ref 0–0.9)
LDLC SERPL CALC-MCNC: 15 MG/DL
LYMPHOCYTES # BLD AUTO: 1.98 K/UL (ref 1–4.8)
LYMPHOCYTES NFR BLD: 17.3 % (ref 22–41)
MCH RBC QN AUTO: 29.8 PG (ref 27–33)
MCHC RBC AUTO-ENTMCNC: 31.4 G/DL (ref 32.2–35.5)
MCV RBC AUTO: 95 FL (ref 81.4–97.8)
MONOCYTES # BLD AUTO: 0.69 K/UL (ref 0–0.85)
MONOCYTES NFR BLD AUTO: 6 % (ref 0–13.4)
NEUTROPHILS # BLD AUTO: 8.58 K/UL (ref 1.82–7.42)
NEUTROPHILS NFR BLD: 74.9 % (ref 44–72)
NRBC # BLD AUTO: 0 K/UL
NRBC BLD-RTO: 0 /100 WBC (ref 0–0.2)
PLATELET # BLD AUTO: 185 K/UL (ref 164–446)
PMV BLD AUTO: 11.4 FL (ref 9–12.9)
POTASSIUM SERPL-SCNC: 4.4 MMOL/L (ref 3.6–5.5)
PROT SERPL-MCNC: 7.1 G/DL (ref 6–8.2)
RBC # BLD AUTO: 5.23 M/UL (ref 4.2–5.4)
SODIUM SERPL-SCNC: 140 MMOL/L (ref 135–145)
T PALLIDUM AB SER QL IA: NORMAL
TRIGL SERPL-MCNC: 260 MG/DL (ref 0–149)
TSH SERPL DL<=0.005 MIU/L-ACNC: 1.27 UIU/ML (ref 0.38–5.33)
VIT B12 SERPL-MCNC: 975 PG/ML (ref 211–911)
WBC # BLD AUTO: 11.5 K/UL (ref 4.8–10.8)

## 2025-03-03 PROCEDURE — 84443 ASSAY THYROID STIM HORMONE: CPT

## 2025-03-03 PROCEDURE — 82746 ASSAY OF FOLIC ACID SERUM: CPT

## 2025-03-03 PROCEDURE — 84425 ASSAY OF VITAMIN B-1: CPT

## 2025-03-03 PROCEDURE — 87491 CHLMYD TRACH DNA AMP PROBE: CPT

## 2025-03-03 PROCEDURE — 86803 HEPATITIS C AB TEST: CPT

## 2025-03-03 PROCEDURE — 86780 TREPONEMA PALLIDUM: CPT

## 2025-03-03 PROCEDURE — 80061 LIPID PANEL: CPT

## 2025-03-03 PROCEDURE — 87591 N.GONORRHOEAE DNA AMP PROB: CPT

## 2025-03-03 PROCEDURE — 82306 VITAMIN D 25 HYDROXY: CPT

## 2025-03-03 PROCEDURE — 36415 COLL VENOUS BLD VENIPUNCTURE: CPT

## 2025-03-03 PROCEDURE — 85025 COMPLETE CBC W/AUTO DIFF WBC: CPT

## 2025-03-03 PROCEDURE — 80053 COMPREHEN METABOLIC PANEL: CPT

## 2025-03-03 PROCEDURE — 82607 VITAMIN B-12: CPT

## 2025-03-04 LAB
C TRACH DNA SPEC QL NAA+PROBE: NEGATIVE
N GONORRHOEA DNA SPEC QL NAA+PROBE: NEGATIVE
SPECIMEN SOURCE: NORMAL

## 2025-03-06 ENCOUNTER — PATIENT MESSAGE (OUTPATIENT)
Dept: BEHAVIORAL HEALTH | Facility: CLINIC | Age: 55
End: 2025-03-06
Payer: MEDICAID

## 2025-03-06 DIAGNOSIS — F43.10 POST TRAUMATIC STRESS DISORDER (PTSD): ICD-10-CM

## 2025-03-06 DIAGNOSIS — F33.2 SEVERE EPISODE OF RECURRENT MAJOR DEPRESSIVE DISORDER, WITHOUT PSYCHOTIC FEATURES (HCC): ICD-10-CM

## 2025-03-06 DIAGNOSIS — F41.1 GAD (GENERALIZED ANXIETY DISORDER): ICD-10-CM

## 2025-03-06 LAB — MISCELLANEOUS LAB RESULT MISCLAB: NORMAL

## 2025-03-06 RX ORDER — DULOXETIN HYDROCHLORIDE 60 MG/1
60 CAPSULE, DELAYED RELEASE ORAL DAILY
Qty: 30 CAPSULE | Refills: 0 | Status: SHIPPED | OUTPATIENT
Start: 2025-03-06 | End: 2025-03-19 | Stop reason: SDUPTHER

## 2025-03-06 NOTE — PATIENT COMMUNICATION
Received request via: Patient    Was the patient seen in the last year in this department? Yes    Does the patient have an active prescription (recently filled or refills available) for medication(s) requested? No    Pharmacy Name: [facility] #    Does the patient have senior living Plus and need 100-day supply? (This applies to ALL medications) Patient does not have SCP

## 2025-03-12 ENCOUNTER — OFFICE VISIT (OUTPATIENT)
Dept: BEHAVIORAL HEALTH | Facility: CLINIC | Age: 55
End: 2025-03-12
Payer: MEDICAID

## 2025-03-12 DIAGNOSIS — F43.10 POST TRAUMATIC STRESS DISORDER (PTSD): ICD-10-CM

## 2025-03-12 DIAGNOSIS — F33.2 SEVERE EPISODE OF RECURRENT MAJOR DEPRESSIVE DISORDER, WITHOUT PSYCHOTIC FEATURES (HCC): ICD-10-CM

## 2025-03-12 PROCEDURE — 90834 PSYTX W PT 45 MINUTES: CPT | Performed by: SOCIAL WORKER
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
## 2025-03-19 ENCOUNTER — OFFICE VISIT (OUTPATIENT)
Dept: BEHAVIORAL HEALTH | Facility: CLINIC | Age: 55
End: 2025-03-19
Payer: MEDICAID

## 2025-03-19 DIAGNOSIS — F41.1 GAD (GENERALIZED ANXIETY DISORDER): ICD-10-CM

## 2025-03-19 DIAGNOSIS — F43.10 POST TRAUMATIC STRESS DISORDER (PTSD): ICD-10-CM

## 2025-03-19 DIAGNOSIS — F33.2 SEVERE EPISODE OF RECURRENT MAJOR DEPRESSIVE DISORDER, WITHOUT PSYCHOTIC FEATURES (HCC): ICD-10-CM

## 2025-03-19 PROCEDURE — 99214 OFFICE O/P EST MOD 30 MIN: CPT | Performed by: PSYCHIATRY & NEUROLOGY

## 2025-03-19 RX ORDER — HYDROXYZINE PAMOATE 25 MG/1
25 CAPSULE ORAL 3 TIMES DAILY PRN
Qty: 90 CAPSULE | Refills: 1 | Status: SHIPPED | OUTPATIENT
Start: 2025-03-19

## 2025-03-19 RX ORDER — PRAZOSIN HYDROCHLORIDE 2 MG/1
2 CAPSULE ORAL NIGHTLY
Qty: 30 CAPSULE | Refills: 3 | Status: SHIPPED | OUTPATIENT
Start: 2025-03-19

## 2025-03-19 RX ORDER — DULOXETIN HYDROCHLORIDE 60 MG/1
60 CAPSULE, DELAYED RELEASE ORAL DAILY
Qty: 30 CAPSULE | Refills: 0 | Status: SHIPPED | OUTPATIENT
Start: 2025-03-19

## 2025-03-19 RX ORDER — BUPROPION HYDROCHLORIDE 300 MG/1
300 TABLET ORAL EVERY MORNING
Qty: 30 TABLET | Refills: 0 | Status: SHIPPED | OUTPATIENT
Start: 2025-03-19

## 2025-03-19 RX ORDER — DULOXETIN HYDROCHLORIDE 30 MG/1
30 CAPSULE, DELAYED RELEASE ORAL DAILY
Qty: 30 CAPSULE | Refills: 1 | Status: SHIPPED | OUTPATIENT
Start: 2025-03-19

## 2025-03-19 NOTE — PROGRESS NOTES
PSYCHIATRY FOLLOW-UP NOTE      Name: Sanna Yuen  MRN: [id number]  : 1970  Age: 54 y.o.  Date of assessment: 3/19/2025  PCP: NIMA Valentin  Persons in attendance: Patient & Family member      REASON FOR VISIT/CHIEF COMPLAINT (as stated by Patient):  Sanna Yuen is a 54 y.o., White female, attending follow-up appointment for mood and anxiety management.      HISTORY OF PRESENT ILLNESS:  Sanna Yune is a 54 y.o. old female with MDD, CRAIG and PTSD comes in today for follow up. Patient was last seen 1 month ago, and following treatment planning recommendations were done:  Increase Cymbalta to 60 mg AM- 30 mg in evening for mood and anxiety.  Continue Wellbutrin  mg daily for depression.  Increase Prazosin to 2 mg at bedtime.  Move HTN medications to lunch time in afternoon (Amlodipine, Losartan and Atenolol)  Continue Vistaril 25-50 mg TID  PRN for severe anxiety only.  Continue psychotherapy for mood and anxiety management.    History of Present Illness    She has been on a regimen of Cymbalta 90 mg for the past 3 weeks, with no reported side effects. She reports no excessive sweating or other adverse reactions. The addition of Cymbalta 30 mg has facilitated quicker sleep onset. It is reported that she appears more rested and is nearly back to her baseline state.  She has been taking prazosin 2 mg at bedtime, which has improved her nightmares, replacing them with unusual dreams. Her sleep pattern is inconsistent, often disrupted by tossing and turning, leading to fatigue. She expresses a desire for consistent sleep duration.  She experienced a significant PTSD episode triggered by loud noises during a visit to her former workplace. She has found relief from her anxiety medication, particularly during stressful events such as returning to her previous employment to submit a resignation letter and complete necessary paperwork for EBT.  She is scheduled for [facility]  tunnel surgery tomorrow.    Her family member was present for the entire session who also agreed with the improvement they have noticed.  Agreed with plan of increasing prazosin to 3 mg for more effective improvement in sleep but she will closely monitor for any signs of bradycardia or hypotension.  We will buy a blood pressure machine as well.      CURRENT MEDICATIONS:  Current Outpatient Medications   Medication Sig Dispense Refill    DULoxetine (CYMBALTA) 60 MG Cap DR Particles delayed-release capsule Take 1 Capsule by mouth every day. (Cymbalta 60 mg + 30 mg = 90 mg daily) 30 Capsule 0    buPROPion (WELLBUTRIN XL) 300 MG XL tablet Take 1 Tablet by mouth every morning. 30 Tablet 0    DULoxetine (CYMBALTA) 30 MG Cap DR Particles Take 1 Capsule by mouth every day. (Cymbalta 60 mg + 30 mg = 90 mg daily) 30 Capsule 1    hydrOXYzine pamoate (VISTARIL) 25 MG Cap Take 1 Capsule by mouth 3 times a day as needed for Anxiety. 90 Capsule 1    prazosin (MINIPRESS) 2 MG Cap Take 1 Capsule by mouth every evening. 30 Capsule 3    prazosin (MINIPRESS) 1 MG Cap Take 1 Capsule by mouth every evening. 90 Capsule 1    Semaglutide, 2 MG/DOSE, (OZEMPIC, 2 MG/DOSE,) 8 MG/3ML Solution Pen-injector Inject 2 mg under the skin every 7 days. 2 mL 5    atenolol (TENORMIN) 50 MG Tab Take 1 Tablet by mouth every day. 90 Tablet 3    pioglitazone (ACTOS) 30 MG Tab Take 1 Tablet by mouth every day. 90 Tablet 3    losartan (COZAAR) 25 MG Tab Take 1 Tablet by mouth every day. 90 Tablet 3    Empagliflozin (JARDIANCE) 25 MG Tab Take 25 mg by mouth every day. 90 Tablet 3    amLODIPine (NORVASC) 10 MG Tab Take 1 Tablet by mouth every day. 90 Tablet 3    metformin (GLUCOPHAGE) 1000 MG tablet Take 1 Tablet by mouth 2 times a day. 180 Tablet 3    ferrous sulfate 325 (65 Fe) MG tablet Take 325 mg by mouth every day.      rosuvastatin (CRESTOR) 20 MG Tab Take 1 Tablet by mouth every evening. 90 Tablet 1    triamcinolone acetonide (KENALOG) 0.1 % Cream Apply  1 Application topically 2 times a day. Apply to affected areas. Use for up to two weeks. 80 g 0    coenzyme Q-10 30 MG capsule Take 60 mg by mouth every day.      glucose blood (RELION TRUE METRIX TEST STRIPS) strip 1 Strip by Other route 2 times a day. 180 Strip 1    cetirizine (ZYRTEC) 10 MG Tab Take 10 mg by mouth every day.      Multiple Vitamins-Minerals (MULTIVITAMIN ADULT PO) Take  by mouth.      Multiple Vitamins-Minerals (EYE VITAMINS PO) Take  by mouth.       No current facility-administered medications for this visit.       MEDICAL HISTORY  Past Medical History:   Diagnosis Date    Arthritis     knee    Diabetes (HCC)     Hyperlipidemia     Hypertension      Past Surgical History:   Procedure Laterality Date    ID TOTAL HIP ARTHROPLASTY Right 5/12/2022    Procedure: RIGHT ANTERIOR TOTAL HIP ARTHROPLASTY;  Surgeon: Frankie Gaytan M.D.;  Location: [facility] [place];  Service: Orthopedics    CHOLECYSTECTOMY      PRIMARY C SECTION      TUBAL COAGULATION LAPAROSCOPIC BILATERAL           PAST PSYCHIATRIC MEDICATIONS  Prozac (angry)  Cymbalta  Wellbutrin   Trazodone  Vistaril     REVIEW OF SYSTEMS:        Constitutional negative   Eyes negative   Ears/Nose/Mouth/Throat negative   Cardiovascular  Hypertension   Respiratory negative   Gastrointestinal negative   Genitourinary negative   Muscular negative   Integumentary negative   Neurological negative   Endocrine  DM   Hematologic/Lymphatic negative     PHYSICAL EXAMINAION:  Vital signs: LMP  (LMP Unknown)   Musculoskeletal: Normal gait.   Abnormal movements: none      MENTAL STATUS EXAMINATION      General:   - Grooming and hygiene: Casual,   - Apparent distress: none,   - Behavior: Calm  - Eye Contact:  Good,   - no psychomotor agitation or retardation    - Participation: Active verbal participation  Orientation: Alert and Fully Oriented to person, place and time  Mood: Anxious  Affect: Flexible and Full range,  Thought Process: Logical and  Goal-directed  Thought Content: Denies suicidal or homicidal ideations, intent or plan   Perception: Denies auditory or visual hallucinations. No delusions noted   Attention span and concentration: Intact   Speech:Rate within normal limits and Volume within normal limits  Language: Appropriate   Insight: Good  Judgment: Good  Recent and remote memory: No gross evidence of memory deficits        DEPRESSION SCREENIN/10/2024     8:40 AM 2024     8:00 AM 2024     8:40 AM   Depression Screen (PHQ-2/PHQ-9)   PHQ-2 Total Score 5 6 6   PHQ-9 Total Score 18 22 24       Interpretation of PHQ-9 Total Score   Score Severity   1-4 No Depression   5-9 Mild Depression   10-14 Moderate Depression   15-19 Moderately Severe Depression   20-27 Severe Depression    CURRENT RISK:       Suicidal: Low       Homicidal: Low       Self-Harm: Low       Relapse: Low       Crisis Safety Plan Reviewed Not Indicated       If evidence of imminent risk is present, intervention/plan:      MEDICAL RECORDS/LABS/DIAGNOSTIC TESTS REVIEWED:  Component      Latest Ref Rng 3/3/2025   TSH      0.380 - 5.330 uIU/mL 1.270          Component  Ref Range & Units (hover) 2 wk ago   Vitamin B12 -True Cobalamin 975 High         Component  Ref Range & Units (hover) 2 wk ago   Folate -Folic Acid 26.3       [place]  records -   Reviewed     (1) MDD; (2) CRAIG; (3) PTSD  Slow improvement; nightmares persisting  Continue Cymbalta to 60 mg AM- 30 mg in evening for mood and anxiety.  Continue Wellbutrin  mg daily for depression.  Increase Prazosin to 3 mg at bedtime.  Move HTN medications to lunch time in afternoon (Amlodipine, Losartan and Atenolol)  Continue Vistaril 25-50 mg TID  PRN for severe anxiety only.  Continue psychotherapy for mood and anxiety management.  Medication options, alternatives (including no medications) and medication risks/benefits/side effects were discussed in detail.  Explained importance of contraceptive measures while on  psychotropic medications, educated to let provider know if ever pregnant or wanting to become pregnant. Verbalized understanding.  The patient was advised to call, message provider on MyChart, or come in to the clinic if symptoms worsen or if any future questions/issues regarding their medications arise; the patient verbalized understanding and agreement.   The patient was educated to call 911, call the suicide hotline, or go to local ER if having thoughts of suicide or homicide; verbalized understanding.      Billing Coding based on:  29085 based on MDM    Return to clinic in 1 month or sooner if symptoms worsen.  Next Appointment: instruction provided on how to make the next appointment.     The proposed treatment plan was discussed with the patient who was provided the opportunity to ask questions and make suggestions regarding alternative treatment. Patient verbalized understanding and expressed agreement with the plan.       Gregorio Wilson M.D.  03/19/25    This note was created using voice recognition software (Dragon). The accuracy of the dictation is limited by the abilities of the software. I have reviewed the note prior to signing, however some errors in grammar and context are still possible. If you have any questions related to this note please do not hesitate to contact our office.

## 2025-03-31 PROBLEM — M65.321 TRIGGER INDEX FINGER OF RIGHT HAND: Status: ACTIVE | Noted: 2025-03-31

## 2025-03-31 PROBLEM — M79.641 RIGHT HAND PAIN: Status: ACTIVE | Noted: 2025-03-31

## 2025-03-31 PROBLEM — M65.331 TRIGGER MIDDLE FINGER OF RIGHT HAND: Status: ACTIVE | Noted: 2025-03-31

## 2025-03-31 PROBLEM — M65.311 TRIGGER THUMB OF RIGHT HAND: Status: ACTIVE | Noted: 2025-03-31

## 2025-03-31 PROBLEM — M65.341 TRIGGER RING FINGER OF RIGHT HAND: Status: ACTIVE | Noted: 2025-03-31

## 2025-04-02 ENCOUNTER — OFFICE VISIT (OUTPATIENT)
Dept: BEHAVIORAL HEALTH | Facility: CLINIC | Age: 55
End: 2025-04-02
Payer: MEDICAID

## 2025-04-02 DIAGNOSIS — F43.10 POST TRAUMATIC STRESS DISORDER (PTSD): ICD-10-CM

## 2025-04-02 DIAGNOSIS — F33.2 SEVERE EPISODE OF RECURRENT MAJOR DEPRESSIVE DISORDER, WITHOUT PSYCHOTIC FEATURES (HCC): ICD-10-CM

## 2025-04-02 PROCEDURE — 90837 PSYTX W PT 60 MINUTES: CPT | Performed by: SOCIAL WORKER

## 2025-04-02 NOTE — PROGRESS NOTES
[facility]  Therapy Progress Note    Patient Name: Sanna Yuen  Patient MRN: [id number]  Today's Date: 4/2/2025     Type of session:Individual psychotherapy  Length of session: 54 minutes  Persons in attendance:Patient    Objective/Observations:   Participation: Active verbal participation   Grooming: Good   Cognition: Alert   Eye contact: Good   Mood: Depressed   Affect: Sad and Tearful   Thought process: Logical and Circumstantial   Speech: Rate within normal limits and Volume within normal limits   Other:     Current risk:   SUICIDE: Low   Homicide: Low   Self-harm: Low   Relapse: Low   Other:    Safety Plan reviewed? No   If evidence of imminent risk is present, intervention/plan:     Diagnoses:   1. Severe episode of recurrent major depressive disorder, without psychotic features (HCC)    2. Post traumatic stress disorder (PTSD)          Therapeutic Intervention(s): Self-care skills, Stressors assessed, and Supportive psychotherapy    Treatment Goal(s)/Objective(s) addressed: In this session, this patient reported that she has been feeling overwhelmed having to take her sister places and is beginning to think that she needs to start taking better care of herself. She shared that she is used to caring for others and when it comes to herself that she struggles to do stuff for herself. This therapist helped this patient identify ways that she practice self-care and begin to take care of some of her own needs. This patient expressed that she feels like no one cares about her and sometimes wants to be cared for in the ways that she cares for others. This therapist reminded this patient that she does have people that care about her well-being and that it is ok for her to do things that will help her at this time like continuing the process of applying for unemployment and rest her wrist to let it heal from her surgery that she just got. This patient shared that she knows this, but just gets  upset with others take her kindness for granted. This therapist encouraged this patient to be patient with herself right now and begin to limit interactions with others who make her feel worse about herself.      Progress toward Treatment Goals: Mild improvement    Plan:  - Continue Individual therapy    Next Session:    Brian Flowers L.C.S.W.  4/2/2025

## 2025-04-09 ENCOUNTER — PATIENT MESSAGE (OUTPATIENT)
Dept: BEHAVIORAL HEALTH | Facility: CLINIC | Age: 55
End: 2025-04-09
Payer: MEDICAID

## 2025-04-09 DIAGNOSIS — F33.2 SEVERE EPISODE OF RECURRENT MAJOR DEPRESSIVE DISORDER, WITHOUT PSYCHOTIC FEATURES (HCC): ICD-10-CM

## 2025-04-09 DIAGNOSIS — F43.10 POST TRAUMATIC STRESS DISORDER (PTSD): ICD-10-CM

## 2025-04-09 DIAGNOSIS — F41.1 GAD (GENERALIZED ANXIETY DISORDER): ICD-10-CM

## 2025-04-10 RX ORDER — DULOXETIN HYDROCHLORIDE 30 MG/1
30 CAPSULE, DELAYED RELEASE ORAL DAILY
Qty: 90 CAPSULE | Refills: 1 | Status: SHIPPED | OUTPATIENT
Start: 2025-04-10 | End: 2025-04-18

## 2025-04-10 RX ORDER — DULOXETIN HYDROCHLORIDE 60 MG/1
60 CAPSULE, DELAYED RELEASE ORAL DAILY
Qty: 90 CAPSULE | Refills: 3 | Status: SHIPPED | OUTPATIENT
Start: 2025-04-10 | End: 2025-04-18 | Stop reason: SDUPTHER

## 2025-04-10 NOTE — PATIENT COMMUNICATION
pt is req a refill     Received request via: Patient    Was the patient seen in the last year in this department? Yes    Does the patient have an active prescription (recently filled or refills available) for medication(s) requested? No    Pharmacy Name: [facility]    Does the patient have USP Plus and need 100-day supply? (This applies to ALL medications) Patient does not have SCP

## 2025-04-12 DIAGNOSIS — F41.1 GAD (GENERALIZED ANXIETY DISORDER): ICD-10-CM

## 2025-04-15 RX ORDER — HYDROXYZINE PAMOATE 25 MG/1
CAPSULE ORAL
Qty: 60 CAPSULE | Refills: 0 | Status: SHIPPED | OUTPATIENT
Start: 2025-04-15

## 2025-04-16 ENCOUNTER — OFFICE VISIT (OUTPATIENT)
Dept: BEHAVIORAL HEALTH | Facility: CLINIC | Age: 55
End: 2025-04-16
Payer: MEDICAID

## 2025-04-16 DIAGNOSIS — F43.10 POST TRAUMATIC STRESS DISORDER (PTSD): ICD-10-CM

## 2025-04-16 DIAGNOSIS — F33.1 MODERATE EPISODE OF RECURRENT MAJOR DEPRESSIVE DISORDER (HCC): ICD-10-CM

## 2025-04-16 PROCEDURE — 90837 PSYTX W PT 60 MINUTES: CPT | Performed by: SOCIAL WORKER

## 2025-04-16 ASSESSMENT — PATIENT HEALTH QUESTIONNAIRE - PHQ9
CLINICAL INTERPRETATION OF PHQ2 SCORE: 3
SUM OF ALL RESPONSES TO PHQ QUESTIONS 1-9: 19
5. POOR APPETITE OR OVEREATING: 2 - MORE THAN HALF THE DAYS
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
## 2025-04-18 ENCOUNTER — OFFICE VISIT (OUTPATIENT)
Dept: BEHAVIORAL HEALTH | Facility: CLINIC | Age: 55
End: 2025-04-18
Payer: MEDICAID

## 2025-04-18 DIAGNOSIS — F43.10 POST TRAUMATIC STRESS DISORDER (PTSD): ICD-10-CM

## 2025-04-18 DIAGNOSIS — F41.1 GAD (GENERALIZED ANXIETY DISORDER): ICD-10-CM

## 2025-04-18 DIAGNOSIS — F33.2 SEVERE EPISODE OF RECURRENT MAJOR DEPRESSIVE DISORDER, WITHOUT PSYCHOTIC FEATURES (HCC): ICD-10-CM

## 2025-04-18 PROCEDURE — 90833 PSYTX W PT W E/M 30 MIN: CPT | Performed by: PSYCHIATRY & NEUROLOGY

## 2025-04-18 PROCEDURE — 99214 OFFICE O/P EST MOD 30 MIN: CPT | Performed by: PSYCHIATRY & NEUROLOGY

## 2025-04-18 RX ORDER — PRAZOSIN HYDROCHLORIDE 5 MG/1
5 CAPSULE ORAL NIGHTLY
Qty: 30 CAPSULE | Refills: 3 | Status: SHIPPED | OUTPATIENT
Start: 2025-04-18

## 2025-04-18 RX ORDER — DULOXETIN HYDROCHLORIDE 60 MG/1
60 CAPSULE, DELAYED RELEASE ORAL 2 TIMES DAILY
Qty: 180 CAPSULE | Refills: 1 | Status: SHIPPED | OUTPATIENT
Start: 2025-04-18

## 2025-04-18 RX ORDER — BUPROPION HYDROCHLORIDE 300 MG/1
300 TABLET ORAL EVERY MORNING
Qty: 90 TABLET | Refills: 1 | Status: SHIPPED | OUTPATIENT
Start: 2025-04-18

## 2025-04-18 NOTE — PROGRESS NOTES
PSYCHIATRY FOLLOW-UP NOTE      Name: Sanna Yuen  MRN: [id number]  : 1970  Age: 54 y.o.  Date of assessment: 2025  PCP: NIMA Valentin  Persons in attendance: Patient  Patient seen from 1:50 pm to 2:22 pm    REASON FOR VISIT/CHIEF COMPLAINT (as stated by Patient):  Sanna Yuen is a 54 y.o., White female, attending follow-up appointment for mood and anxiety management.      HISTORY OF PRESENT ILLNESS:  Sanna Yuen is a 54 y.o. old female with MDD, CRAIG and PTSD comes in today for follow up. Patient was last seen 1 month ago, and following treatment planning recommendations were done:  Continue Cymbalta to 60 mg AM- 30 mg in evening for mood and anxiety.  Continue Wellbutrin  mg daily for depression.  Increase Prazosin to 3 mg at bedtime.  Move HTN medications to lunch time in afternoon (Amlodipine, Losartan and Atenolol)  Continue Vistaril 25-50 mg TID  PRN for severe anxiety only.  Continue psychotherapy for mood and anxiety management.    History of Present Illness    The chief complaint includes ongoing sleep disturbances, such as nightmares and restlessness at night, despite the increased dosage of prazosin.   Several emotional setbacks have been experienced, including a significant anxiety attack witnessed by her parents last weekend. The trigger for this episode remains unknown.   She describes feeling ignored and financially strained, unable to work or manage her credit card debt. Concerns about future financial stability, particularly in relation to home ownership and car maintenance, are expressed. Applications for unemployment benefits and workers' compensation have been made, but obstacles have been encountered in both processes. Feelings of disappointment or anger from others exacerbate her anxiety.   Currently, she is on a 3 mg dose of prazosin, which was increased during the last visit. No adverse effects such as dizziness or hypotension are  reported. Other antihypertensive medications are administered in the morning due to forgetfulness if taken at lunchtime. A blood pressure monitor has not yet been procured. Recent doctor's visits have shown blood pressure readings within normal limits.  Agreed to plan of increasing Cymbalta for more effective mood stabilization with PTSD reactivity symptoms and increasing prazosin for nightmares.  Educated to monitor for signs of hypotension or bradycardia.      PSYCHOTHERAPY ASPECT OF SESSION (16 MIN):  Session dedicated to letting patient express her feelings related to recent stressors.  Psychoeducation provided on understanding the difference between outside in approach (with reliance on outside factors, people, events or situation to feel safe/better inside) versus inside out approach (by working on strengthening her inside capabilities and potential).  Discussed previous strategies including importance of not reacting but responding by taking a pause followed by slow diaphragmatic breathing followed by positive affirmation (ex: how difficulties are opportunity given to her to grow) followed by working on redirecting her focus on what she has control over in a given situation. Motivated to repeat this on a daily basis, till this becomes a habit.  Later part of the session was dedicated to psychoeducation, active listening and implementing supportive therapy      CURRENT MEDICATIONS:  Current Outpatient Medications   Medication Sig Dispense Refill    hydrOXYzine pamoate (VISTARIL) 25 MG Cap TAKE 1 CAPSULE BY MOUTH TWICE DAILY AS NEEDED FOR ANXIETY 60 Capsule 0    DULoxetine (CYMBALTA) 60 MG Cap DR Particles delayed-release capsule Take 1 Capsule by mouth every day. (Cymbalta 60 mg + 30 mg = 90 mg daily) 90 Capsule 3    DULoxetine (CYMBALTA) 30 MG Cap DR Particles Take 1 Capsule by mouth every day. (Cymbalta 60 mg + 30 mg = 90 mg daily) 90 Capsule 1    buPROPion (WELLBUTRIN XL) 300 MG XL tablet Take 1 Tablet by  mouth every morning. 30 Tablet 0    prazosin (MINIPRESS) 2 MG Cap Take 1 Capsule by mouth every evening. (Prazosin 2 + 1 = 3 mg at bedtime) 30 Capsule 3    prazosin (MINIPRESS) 1 MG Cap Take 1 Capsule by mouth every evening. 90 Capsule 1    Semaglutide, 2 MG/DOSE, (OZEMPIC, 2 MG/DOSE,) 8 MG/3ML Solution Pen-injector Inject 2 mg under the skin every 7 days. 2 mL 5    atenolol (TENORMIN) 50 MG Tab Take 1 Tablet by mouth every day. 90 Tablet 3    pioglitazone (ACTOS) 30 MG Tab Take 1 Tablet by mouth every day. 90 Tablet 3    losartan (COZAAR) 25 MG Tab Take 1 Tablet by mouth every day. 90 Tablet 3    Empagliflozin (JARDIANCE) 25 MG Tab Take 25 mg by mouth every day. 90 Tablet 3    amLODIPine (NORVASC) 10 MG Tab Take 1 Tablet by mouth every day. 90 Tablet 3    metformin (GLUCOPHAGE) 1000 MG tablet Take 1 Tablet by mouth 2 times a day. 180 Tablet 3    ferrous sulfate 325 (65 Fe) MG tablet Take 325 mg by mouth every day.      rosuvastatin (CRESTOR) 20 MG Tab Take 1 Tablet by mouth every evening. 90 Tablet 1    triamcinolone acetonide (KENALOG) 0.1 % Cream Apply 1 Application topically 2 times a day. Apply to affected areas. Use for up to two weeks. 80 g 0    coenzyme Q-10 30 MG capsule Take 60 mg by mouth every day.      glucose blood (RELION TRUE METRIX TEST STRIPS) strip 1 Strip by Other route 2 times a day. 180 Strip 1    cetirizine (ZYRTEC) 10 MG Tab Take 10 mg by mouth every day.      Multiple Vitamins-Minerals (MULTIVITAMIN ADULT PO) Take  by mouth.      Multiple Vitamins-Minerals (EYE VITAMINS PO) Take  by mouth.       No current facility-administered medications for this visit.       MEDICAL HISTORY  Past Medical History:   Diagnosis Date    Arthritis     knee    Diabetes (HCC)     High cholesterol     Hyperlipidemia     Hypertension      Past Surgical History:   Procedure Laterality Date    DC NEUROPLASTY & OR TRANSPOS MEDIAN NRV CARPAL JUAN CARLOS Left 3/20/2025    Procedure: LEFT HAND OPEN CARPAL TUNNEL RELEASE;   Surgeon: Bassam Carter M.D.;  Location: [facility];  Service: Orthopedics    PB INCISE FINGER TENDON SHEATH Left 3/20/2025    Procedure: LEFT THUMB, INDEX, MIDDLE AND RING FINGER TRIGGER RELEASE;  Surgeon: Bassam Carter M.D.;  Location: [facility];  Service: Orthopedics    SC DRAIN/INJECT SMALL JOINT/BURSA Left 3/20/2025    Procedure: LEFT THUMB CARPOMETACARPAL INJECTION;  Surgeon: Bassam Carter M.D.;  Location: [facility];  Service: Orthopedics    SC TOTAL HIP ARTHROPLASTY Right 5/12/2022    Procedure: RIGHT ANTERIOR TOTAL HIP ARTHROPLASTY;  Surgeon: Frankie Gaytan M.D.;  Location: [facility];  Service: Orthopedics    CHOLECYSTECTOMY      PRIMARY C SECTION      TUBAL COAGULATION LAPAROSCOPIC BILATERAL           PAST PSYCHIATRIC MEDICATIONS  Prozac (angry)  Cymbalta  Wellbutrin   Trazodone  Vistaril     REVIEW OF SYSTEMS:        Constitutional negative   Eyes negative   Ears/Nose/Mouth/Throat negative   Cardiovascular  Hypertension   Respiratory negative   Gastrointestinal negative   Genitourinary negative   Muscular negative   Integumentary negative   Neurological negative   Endocrine  DM   Hematologic/Lymphatic negative       PHYSICAL EXAMINAION:  Vital signs: LMP  (LMP Unknown)   Musculoskeletal: Normal gait.   Abnormal movements: none      MENTAL STATUS EXAMINATION      General:   - Grooming and hygiene: Casual,   - Apparent distress: tense,   - Behavior: Tense  - Eye Contact:  Good,   - no psychomotor agitation or retardation    - Participation: Active verbal participation  Orientation: Alert and Fully Oriented to person, place and time  Mood: Depressed and Anxious  Affect: Constricted,  Thought Process: Logical and Goal-directed  Thought Content: Denies suicidal or homicidal ideations, intent or plan   Perception: Denies auditory or visual hallucinations. No delusions noted   Attention span and concentration: Intact   Speech:Rate  within normal limits and Volume within normal limits  Language: Appropriate   Insight: Good  Judgment: Good  Recent and remote memory: No gross evidence of memory deficits        DEPRESSION SCREENIN/20/2024     8:00 AM 2024     8:40 AM 2025    11:00 AM   Depression Screen (PHQ-2/PHQ-9)   PHQ-2 Total Score 6 6 3   PHQ-9 Total Score 22 24 19       Interpretation of PHQ-9 Total Score   Score Severity   1-4 No Depression   5-9 Mild Depression   10-14 Moderate Depression   15-19 Moderately Severe Depression   20-27 Severe Depression    CURRENT RISK:       Suicidal: Low       Homicidal: Low       Self-Harm: Low       Relapse: Low       Crisis Safety Plan Reviewed Not Indicated       If evidence of imminent risk is present, intervention/plan:      MEDICAL RECORDS/LABS/DIAGNOSTIC TESTS REVIEWED:  No new lab since last visit     [place]  records -   Reviewed     (1) MDD; (2) CRAIG; (3) PTSD  Slow improvement; nightmares persisting  Increase Cymbalta to 60 mg BID for mood and anxiety.  Continue Wellbutrin  mg daily for depression.  Increase Prazosin to 5 mg at bedtime.  Move HTN medications to lunch time in afternoon (Amlodipine, Losartan and Atenolol)  Continue Vistaril 25-50 mg TID  PRN for severe anxiety only.  Continue psychotherapy for mood and anxiety management.  Medication options, alternatives (including no medications) and medication risks/benefits/side effects were discussed in detail.  Explained importance of contraceptive measures while on psychotropic medications, educated to let provider know if ever pregnant or wanting to become pregnant. Verbalized understanding.  The patient was advised to call, message provider on [employer], or come in to the clinic if symptoms worsen or if any future questions/issues regarding their medications arise; the patient verbalized understanding and agreement.   The patient was educated to call 911, call the suicide hotline, or go to local ER if having thoughts  of suicide or homicide; verbalized understanding.      Billing Coding based on:  [id number] based on [facility]  [id number]: based on psychotherapy timing    Return to clinic in 1 month or sooner if symptoms worsen.  Next Appointment: instruction provided on how to make the next appointment.     The proposed treatment plan was discussed with the patient who was provided the opportunity to ask questions and make suggestions regarding alternative treatment. Patient verbalized understanding and expressed agreement with the plan.       Gregorio Wilson M.D.  04/18/25    This note was created using voice recognition software (Dragon). The accuracy of the dictation is limited by the abilities of the software. I have reviewed the note prior to signing, however some errors in grammar and context are still possible. If you have any questions related to this note please do not hesitate to contact our office.

## 2025-04-20 DIAGNOSIS — E11.65 TYPE 2 DIABETES MELLITUS WITH HYPERGLYCEMIA, WITHOUT LONG-TERM CURRENT USE OF INSULIN (HCC): Chronic | ICD-10-CM

## 2025-04-20 DIAGNOSIS — I10 ESSENTIAL HYPERTENSION: Chronic | ICD-10-CM

## 2025-04-21 RX ORDER — LOSARTAN POTASSIUM 25 MG/1
25 TABLET ORAL DAILY
Qty: 150 TABLET | Refills: 0 | Status: SHIPPED | OUTPATIENT
Start: 2025-04-21

## 2025-04-21 RX ORDER — ATENOLOL 50 MG/1
50 TABLET ORAL DAILY
Qty: 150 TABLET | Refills: 0 | Status: SHIPPED | OUTPATIENT
Start: 2025-04-21

## 2025-04-21 NOTE — TELEPHONE ENCOUNTER
Received request via: Pharmacy    Was the patient seen in the last year in this department? Yes    Does the patient have an active prescription (recently filled or refills available) for medication(s) requested? No    [facility]    Does the patient have longterm Plus and need 100-day supply? (This applies to ALL medications) Patient does not have SCP    Last office visit  08/27/2024  Last labs 03/03/2025

## 2025-04-30 ENCOUNTER — OFFICE VISIT (OUTPATIENT)
Dept: BEHAVIORAL HEALTH | Facility: CLINIC | Age: 55
End: 2025-04-30
Payer: MEDICAID

## 2025-04-30 DIAGNOSIS — F33.1 MODERATE EPISODE OF RECURRENT MAJOR DEPRESSIVE DISORDER (HCC): ICD-10-CM

## 2025-04-30 DIAGNOSIS — F43.10 POST TRAUMATIC STRESS DISORDER (PTSD): ICD-10-CM

## 2025-04-30 NOTE — PROGRESS NOTES
[facility]  Therapy Progress Note    Patient Name: Sanna Yuen  Patient MRN: [id number]  Today's Date: 4/30/2025     Type of session:Individual psychotherapy  Length of session: 53 minutes  Persons in attendance:Patient    Objective/Observations:   Participation: Active verbal participation   Grooming: Good   Cognition: Alert   Eye contact: Good   Mood: Depressed and Anxious   Affect: Flexible and Congruent with content   Thought process: Logical and Circumstantial   Speech: Rate within normal limits and Volume within normal limits   Other:     Current risk:   SUICIDE: Low   Homicide: Low   Self-harm: Low   Relapse: Low   Other:    Safety Plan reviewed? No   If evidence of imminent risk is present, intervention/plan:     Diagnoses:   1. Post traumatic stress disorder (PTSD)    2. Moderate episode of recurrent major depressive disorder (HCC)          Therapeutic Intervention(s): Interpersonal effectiveness skills, Stressors assessed, and Supportive psychotherapy    Treatment Goal(s)/Objective(s) addressed: In this session, this patient reported that she still has not begun to receive unemployment and is feeling frustrated that it is taking so long. She also shared that she feels like her parents are treating her like a child reminding her to eat and calling her constantly when she is out wondering where is at. This therapist helped this patient identify how she can set some boundaries with them and communicate with them how overwhelming it can be when they are constantly on top of her. This therapist taught this patient how to define there noble intent and express appreciation for there concerns and then ask for what she needs from them so she does not feel as anxious when she is out running errands. This patient stated that she is scared that they might get upset with her. This therapist provided two examples of ways that she could communicate with them, one that is defensive and one that is  more assertive so she understood the difference. This therapist also encouraged this patient to practice the skills that we discussed in our session and try to challenge her negative thoughts at times so she can separate how her ex would treat her and how her parents will likely respond to her.      Progress toward Treatment Goals: Mild improvement    Plan:  - Continue Individual therapy    Next Session:    Brian Floewrs L.C.S.W.  4/30/2025

## 2025-05-10 DIAGNOSIS — F41.1 GAD (GENERALIZED ANXIETY DISORDER): ICD-10-CM

## 2025-05-13 RX ORDER — HYDROXYZINE PAMOATE 25 MG/1
CAPSULE ORAL
Qty: 60 CAPSULE | Refills: 0 | Status: SHIPPED | OUTPATIENT
Start: 2025-05-13

## 2025-05-14 ENCOUNTER — APPOINTMENT (OUTPATIENT)
Dept: BEHAVIORAL HEALTH | Facility: CLINIC | Age: 55
End: 2025-05-14
Payer: MEDICAID

## 2025-05-20 ENCOUNTER — OFFICE VISIT (OUTPATIENT)
Dept: BEHAVIORAL HEALTH | Facility: CLINIC | Age: 55
End: 2025-05-20
Payer: MEDICAID

## 2025-05-20 DIAGNOSIS — F33.2 SEVERE EPISODE OF RECURRENT MAJOR DEPRESSIVE DISORDER, WITHOUT PSYCHOTIC FEATURES (HCC): Primary | ICD-10-CM

## 2025-05-20 DIAGNOSIS — F43.10 POST TRAUMATIC STRESS DISORDER (PTSD): ICD-10-CM

## 2025-05-20 DIAGNOSIS — F41.1 GAD (GENERALIZED ANXIETY DISORDER): ICD-10-CM

## 2025-05-20 PROCEDURE — 90833 PSYTX W PT W E/M 30 MIN: CPT | Performed by: PSYCHIATRY & NEUROLOGY

## 2025-05-20 PROCEDURE — 99214 OFFICE O/P EST MOD 30 MIN: CPT | Performed by: PSYCHIATRY & NEUROLOGY

## 2025-05-20 NOTE — PROGRESS NOTES
PSYCHIATRY FOLLOW-UP NOTE      Name: Sanna Yuen  MRN: [id number]  : 1970  Age: 54 y.o.  Date of assessment: 2025  PCP: NIMA Valentin  Persons in attendance: Patient      REASON FOR VISIT/CHIEF COMPLAINT (as stated by Patient):  Sanna Yuen is a 54 y.o., White female, attending follow-up appointment for mood and anxiety management.      HISTORY OF PRESENT ILLNESS:  Sanna Yuen is a 54 y.o. old female with MDD, CRAIG and PTSD comes in today for follow up. Patient was last seen 1 month ago, and following treatment planning recommendations were done:  Increase Cymbalta to 60 mg BID for mood and anxiety.  Continue Wellbutrin  mg daily for depression.  Increase Prazosin to 5 mg at bedtime.  Move HTN medications to lunch time in afternoon (Amlodipine, Losartan and Atenolol)  Continue Vistaril 25-50 mg TID  PRN for severe anxiety only.  Continue psychotherapy for mood and anxiety management.    History of Present Illness  The patient presents for evaluation of anxiety, depression, and PTSD.    She has been on a regimen of Cymbalta and prazosin for approximately 1.5 weeks, which she reports as beneficial in managing her sleep disturbances. However, she continues to experience unusual dreams. Her parents have expressed concern over her persistent fatigue, despite an increase in her sleep duration. She is uncertain if this fatigue is a side effect of her medication or a result of her current life circumstances.    She acknowledges a slight improvement in her depressive symptoms but reports a significant increase in anxiety, particularly when interacting with her younger sister. She also notes that certain situations trigger her post-traumatic stress disorder (PTSD), leading to episodes of hypoglycemia. She has observed that her blood sugar levels remain stable when she is not in the company of her sister. She has made the decision to distance herself from her sister  due to these negative impacts on her health. She resides with her parents, which she finds challenging, and has requested them not to discuss her sister's issues with her. She has ceased watching the news due to its negative content. She is currently unemployed and is considering returning to work.    Social History:  - Resides with parents  - Unemployed  - Has a younger sister      PSYCHOTHERAPY ASPECT OF SESSION (16 MIN):  Session was dedicated to letting patient express her feelings related to recent stressors from family stress standpoint.  Validation was provided for appropriate emotional responses and discussed the importance of  appropriate from intrusive emotional responses.  Discussed the importance of giving appropriate labels to her emotion for example understanding the difference between appropriate anger response from intrusive manage feeling.  Importance of having an outlet at that and keeping her emotions anxiety was emphasized.  Discussed various strategies to discuss her concerns with parents so that she can work on areas she has control over.  Later part of the session was dedicated to psychoeducation and active listening.      CURRENT MEDICATIONS:  Current Medications[1]    MEDICAL HISTORY  Past Medical History[2]  Past Surgical History[3]    PAST PSYCHIATRIC MEDICATIONS  Prozac (angry)  Cymbalta  Wellbutrin   Trazodone  Vistaril     REVIEW OF SYSTEMS:        Constitutional negative   Eyes negative   Ears/Nose/Mouth/Throat negative   Cardiovascular  Hypertension   Respiratory negative   Gastrointestinal negative   Genitourinary negative   Muscular negative   Integumentary negative   Neurological negative   Endocrine  DM   Hematologic/Lymphatic negative     PHYSICAL EXAMINAION:  Vital signs: LMP  (LMP Unknown)   Musculoskeletal: Normal gait.   Abnormal movements: none      MENTAL STATUS EXAMINATION      General:   - Grooming and hygiene: Casual,   - Apparent distress: tense,   - Behavior:  Tense  - Eye Contact:  Good,   - no psychomotor agitation or retardation    - Participation: Active verbal participation  Orientation: Alert and Fully Oriented to person, place and time  Mood: Anxious  Affect: Constricted,  Thought Process: Logical and Goal-directed  Thought Content: Denies suicidal or homicidal ideations, intent or plan   Perception: Denies auditory or visual hallucinations. No delusions noted   Attention span and concentration: Intact   Speech:Rate within normal limits and Volume within normal limits  Language: Appropriate   Insight: Good  Judgment: Good  Recent and remote memory: No gross evidence of memory deficits        DEPRESSION SCREENIN/20/2024     8:00 AM 2024     8:40 AM 2025    11:00 AM   Depression Screen (PHQ-2/PHQ-9)   PHQ-2 Total Score 6 6 3   PHQ-9 Total Score 22 24 19       Interpretation of PHQ-9 Total Score   Score Severity   1-4 No Depression   5-9 Mild Depression   10-14 Moderate Depression   15-19 Moderately Severe Depression   20-27 Severe Depression    CURRENT RISK:       Suicidal: Low       Homicidal: Low       Self-Harm: Low       Relapse: Low       Crisis Safety Plan Reviewed Not Indicated       If evidence of imminent risk is present, intervention/plan:      MEDICAL RECORDS/LABS/DIAGNOSTIC TESTS REVIEWED:  No new lab since last visit     [place]  records -   Reviewed     (1) MDD; (2) CRAIG; (3) PTSD  Slow improvement. Medication increased 1.5 weeks ago  Continue Cymbalta 60 mg BID for mood and anxiety.  Continue Wellbutrin  mg daily for depression.  Continue Prazosin 5 mg at bedtime.  Move HTN medications to lunch time in afternoon (Amlodipine, Losartan and Atenolol)  Continue Vistaril 25-50 mg TID  PRN for severe anxiety only.  Continue psychotherapy for mood and anxiety management.  Medication options, alternatives (including no medications) and medication risks/benefits/side effects were discussed in detail.  Explained importance of contraceptive  measures while on psychotropic medications, educated to let provider know if ever pregnant or wanting to become pregnant. Verbalized understanding.  The patient was advised to call, message provider on MyChart, or come in to the clinic if symptoms worsen or if any future questions/issues regarding their medications arise; the patient verbalized understanding and agreement.   The patient was educated to call 911, call the suicide hotline, or go to local ER if having thoughts of suicide or homicide; verbalized understanding.      Billing Coding based on:  26419 based on [facility]  21812: based on psychotherapy timing    Return to clinic in 1 month or sooner if symptoms worsen.  Next Appointment: instruction provided on how to make the next appointment.     The proposed treatment plan was discussed with the patient who was provided the opportunity to ask questions and make suggestions regarding alternative treatment. Patient verbalized understanding and expressed agreement with the plan.       Gregorio Wilson M.D.  05/20/25    This note was created using voice recognition software (Dragon). The accuracy of the dictation is limited by the abilities of the software. I have reviewed the note prior to signing, however some errors in grammar and context are still possible. If you have any questions related to this note please do not hesitate to contact our office.            [1]   Current Outpatient Medications   Medication Sig Dispense Refill    hydrOXYzine pamoate (VISTARIL) 25 MG Cap TAKE 1 CAPSULE BY MOUTH TWICE DAILY AS NEEDED FOR ANXIETY 60 Capsule 0    losartan (COZAAR) 25 MG Tab Take 1 tablet by mouth once daily 150 Tablet 0    atenolol (TENORMIN) 50 MG Tab Take 1 tablet by mouth once daily 150 Tablet 0    metformin (GLUCOPHAGE) 1000 MG tablet Take 1 tablet by mouth twice daily 300 Tablet 0    DULoxetine (CYMBALTA) 60 MG Cap DR Particles delayed-release capsule Take 1 Capsule by mouth 2 times a day. 180 Capsule 1     buPROPion (WELLBUTRIN XL) 300 MG XL tablet Take 1 Tablet by mouth every morning. 90 Tablet 1    prazosin (MINIPRESS) 5 MG Cap Take 1 Capsule by mouth every evening. 30 Capsule 3    Semaglutide, 2 MG/DOSE, (OZEMPIC, 2 MG/DOSE,) 8 MG/3ML Solution Pen-injector Inject 2 mg under the skin every 7 days. 2 mL 5    pioglitazone (ACTOS) 30 MG Tab Take 1 Tablet by mouth every day. 90 Tablet 3    Empagliflozin (JARDIANCE) 25 MG Tab Take 25 mg by mouth every day. 90 Tablet 3    amLODIPine (NORVASC) 10 MG Tab Take 1 Tablet by mouth every day. 90 Tablet 3    ferrous sulfate 325 (65 Fe) MG tablet Take 325 mg by mouth every day.      rosuvastatin (CRESTOR) 20 MG Tab Take 1 Tablet by mouth every evening. 90 Tablet 1    triamcinolone acetonide (KENALOG) 0.1 % Cream Apply 1 Application topically 2 times a day. Apply to affected areas. Use for up to two weeks. 80 g 0    coenzyme Q-10 30 MG capsule Take 60 mg by mouth every day.      glucose blood (RELION TRUE METRIX TEST STRIPS) strip 1 Strip by Other route 2 times a day. 180 Strip 1    cetirizine (ZYRTEC) 10 MG Tab Take 10 mg by mouth every day.      Multiple Vitamins-Minerals (MULTIVITAMIN ADULT PO) Take  by mouth.      Multiple Vitamins-Minerals (EYE VITAMINS PO) Take  by mouth.       No current facility-administered medications for this visit.   [2]   Past Medical History:  Diagnosis Date    Arthritis     knee    Diabetes (HCC)     High cholesterol     Hyperlipidemia     Hypertension    [3]   Past Surgical History:  Procedure Laterality Date    NC NEUROPLASTY & OR TRANSPOS MEDIAN NRV CARPAL JUAN CARLOS Left 3/20/2025    Procedure: LEFT HAND OPEN CARPAL TUNNEL RELEASE;  Surgeon: Bassam Carter M.D.;  Location: [facility] [place];  Service: Orthopedics    PB INCISE FINGER TENDON SHEATH Left 3/20/2025    Procedure: LEFT THUMB, INDEX, MIDDLE AND RING FINGER TRIGGER RELEASE;  Surgeon: Bassam Carter M.D.;  Location: [facility] [place];  Service: Orthopedics    NC  DRAIN/INJECT SMALL JOINT/BURSA Left 3/20/2025    Procedure: LEFT THUMB CARPOMETACARPAL INJECTION;  Surgeon: Bassam Carter M.D.;  Location: [facility] [place];  Service: Orthopedics    NH TOTAL HIP ARTHROPLASTY Right 5/12/2022    Procedure: RIGHT ANTERIOR TOTAL HIP ARTHROPLASTY;  Surgeon: Frankie Gaytan M.D.;  Location: [facility];  Service: Orthopedics    CHOLECYSTECTOMY      PRIMARY C SECTION      TUBAL COAGULATION LAPAROSCOPIC BILATERAL

## 2025-05-28 ENCOUNTER — OFFICE VISIT (OUTPATIENT)
Dept: BEHAVIORAL HEALTH | Facility: CLINIC | Age: 55
End: 2025-05-28
Payer: MEDICAID

## 2025-05-28 DIAGNOSIS — F43.10 POST TRAUMATIC STRESS DISORDER (PTSD): ICD-10-CM

## 2025-05-28 DIAGNOSIS — F33.1 MODERATE EPISODE OF RECURRENT MAJOR DEPRESSIVE DISORDER (HCC): Primary | ICD-10-CM
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
## 2025-06-05 ENCOUNTER — NON-PROVIDER VISIT (OUTPATIENT)
Dept: VASCULAR LAB | Facility: MEDICAL CENTER | Age: 55
End: 2025-06-05
Attending: INTERNAL MEDICINE
Payer: MEDICAID

## 2025-06-05 PROCEDURE — 99212 OFFICE O/P EST SF 10 MIN: CPT

## 2025-06-05 NOTE — PROGRESS NOTES
Patient Consult Note     Primary care physician: Lee Ghotra P.A.-C.    Reason for consult: Management of Uncontrolled Type 2 Diabetes    HPI:  Sanna Yuen is a 52 y.o. old patient who comes in today for evaluation of above stated problem.    Allergies:  Lipitor [atorvastatin], Niacin, and Penicillins    Potential Barriers to Care:  Adherence: denies missed doses  Side effects: none  Affordability: no issues at this time      SMBG  Pt has home glucometer and proper testing technique - Yes     Pt reports blood sugars:   Before Breakfast:  (mostly 120s)  Before Bed: 116-174 (mostly 140-150s)     Hypoglycemia  Hypoglycemia awareness: Yes  Nocturnal hypoglycemia: Possible, unclear etiology.   Hypoglycemia:  Possible, unclear as pt attributes symptoms to PTSD/Anxiety  Pt's treatment of Hypoglycemia  Discussed 15:15 Rule     Lifestyle  Current Exercise - walks between 5,000 and 16,000 steps/day     Dietary - eats frequent small portions, now gluten free  Breakfast: an egg w/ toast, or a bowl of cereal, or yogurt with 1/2 banana  Lunch: soup, or sandwich, or leftovers from the night before (spaghetti)   Dinner: chicken/pork chop and salad w/ a vegetable, or applesauce  Snacks: gluten free granola bar, or gluten free crackers, or mini bag of popcorn, or string cheese  Drinks: coffee w/ splenda, diet soda, flavored water, or sugar free Gatorade/propel     Most Recent labs, A1c, and immunizations:    Lab Results   Component Value Date/Time    HBA1C 6.1 (H) 01/24/2025 04:03 PM          Lab Results   Component Value Date/Time    CHOLSTRLTOT 115 03/03/2025 12:45 PM    LDL 15 03/03/2025 12:45 PM    HDL 48 03/03/2025 12:45 PM    TRIGLYCERIDE 260 (H) 03/03/2025 12:45 PM       Lab Results   Component Value Date/Time    SODIUM 140 03/03/2025 12:45 PM    POTASSIUM 4.4 03/03/2025 12:45 PM    CHLORIDE 102 03/03/2025 12:45 PM    CO2 23 03/03/2025 12:45 PM    GLUCOSE 118 (H) 03/03/2025 12:45 PM    BUN 14 03/03/2025  12:45 PM    CREATININE 0.74 03/03/2025 12:45 PM     Lab Results   Component Value Date/Time    ALKPHOSPHAT 61 03/03/2025 12:45 PM    ASTSGOT 52 (H) 03/03/2025 12:45 PM    ALTSGPT 129 (H) 03/03/2025 12:45 PM    TBILIRUBIN 0.3 03/03/2025 12:45 PM    ALBUMIN 4.5 03/03/2025 12:45 PM      Lab Results   Component Value Date/Time    MALBCRT 19 12/07/2023 06:17 AM    MICROALBUR 1.4 12/07/2023 06:17 AM     Immunization History   Administered Date(s) Administered    Influenza (IM) Preservative Free - HISTORICAL DATA 09/18/2011, 10/06/2014, 10/26/2015    Influenza Seasonal Injectable - Historical Data 12/09/2012, 10/28/2016    Influenza Vac Subunit Quad Inj (Pf) 10/26/2017, 10/09/2020    Influenza Vaccine Pediatric Split - Historical Data 05/22/2007, 03/11/2008, 09/15/2010    Influenza Vaccine Quad Inj (Pf) 09/28/2019, 09/16/2021    Influenza Vaccine Quad Inj (Preserved) 10/17/2018    Torri SARS-CoV-2 Vaccine 04/28/2021    MODERNA SARS-COV-2 VACCINE (12+) 12/01/2021    Pneumococcal polysaccharide vaccine (PPSV-23) 09/07/2018    TD Vaccine 05/22/2007    Tdap Vaccine 09/07/2018    Zoster Vaccine Recombinant (RZV) (SHINGRIX) 11/01/2021     Physical Examination:  Vital signs: LMP  (LMP Unknown)  There is no height or weight on file to calculate BMI.      Medications:    Current Outpatient Medications:     hydrOXYzine pamoate, TAKE 1 CAPSULE BY MOUTH TWICE DAILY AS NEEDED FOR ANXIETY    losartan, 25 mg, Oral, DAILY    atenolol, 50 mg, Oral, DAILY    metformin, 1,000 mg, Oral, BID    DULoxetine, 60 mg, Oral, BID    buPROPion, 300 mg, Oral, QAM    prazosin, 5 mg, Oral, Nightly    Ozempic (2 MG/DOSE), 2 mg, Subcutaneous, Q7 DAYS    pioglitazone, 30 mg, Oral, DAILY    Jardiance, 25 mg, Oral, DAILY    amLODIPine, 10 mg, Oral, DAILY    ferrous sulfate, 325 mg, Oral, DAILY    rosuvastatin, 20 mg, Oral, Q EVENING    triamcinolone acetonide, 1 Application, Topical, BID    coenzyme Q-10, 60 mg, Oral, DAILY    ReliOn True Metrix Test  "Strips, 1 Each, Other, BID    cetirizine, 10 mg, Oral, DAILY    Multiple Vitamins-Minerals (MULTIVITAMIN ADULT PO), Take  by mouth.    Multiple Vitamins-Minerals (EYE VITAMINS PO), Take  by mouth.      Assessment and Plan:  1. DM2   Most recent A1c is: 6.1% - pt reports that she has been seen at [facility] and an A1c was performed there approximately 2 months ago resulting at 6.0% per pt report.   Kidney function:  >60 ml/min creatinine clearance    Medication(s) recommended:   Cont. Metformin: 1000mg BID   Cont Actos 30 mg once daily   Instructed pt to check FSBG value with next \"shaky\" episode and if BG is <70 mg/dL to reduce dose from 30 mg daily down to 15 mg daily.   Cont. Jardiance 25mg   Cont. Ozempic 2 mg every 7 days     -Blood glucose and A1c target  Blood Sugar goals   Fasting goal: between  mg/dL (~100)   1-2 hours after meals goal: less than 180 mg/dL (~ less than 200)       A1c goal: <7%    3.  Lifestyle changes   Focus on eating a DASH/Mediterranean-style diet.   Exercise 30 minutes daily at least 5 days/week, as tolerated.    Follow-up appointment in 6 month(s)/PRELADIO Rodriguez PharmD  Authorized Nuclear Pharmacist (ANP)  [facility]  Phone [phone] fax [phone]      "

## 2025-06-05 NOTE — PATIENT INSTRUCTIONS
Blood Sugar goals   Fasting goal: between  mg/dL (~100)   1-2 hours after meals goal: less than 180 mg/dL (~ less than 200)

## 2025-06-10 DIAGNOSIS — F41.1 GAD (GENERALIZED ANXIETY DISORDER): ICD-10-CM

## 2025-06-10 RX ORDER — HYDROXYZINE PAMOATE 25 MG/1
CAPSULE ORAL
Qty: 60 CAPSULE | Refills: 0 | Status: SHIPPED | OUTPATIENT
Start: 2025-06-10 | End: 2025-06-25 | Stop reason: SDUPTHER

## 2025-06-12 ENCOUNTER — OFFICE VISIT (OUTPATIENT)
Dept: BEHAVIORAL HEALTH | Facility: CLINIC | Age: 55
End: 2025-06-12
Payer: MEDICAID

## 2025-06-12 DIAGNOSIS — F33.1 MODERATE EPISODE OF RECURRENT MAJOR DEPRESSIVE DISORDER (HCC): ICD-10-CM

## 2025-06-12 DIAGNOSIS — F43.10 POST TRAUMATIC STRESS DISORDER (PTSD): Primary | ICD-10-CM

## 2025-06-12 PROCEDURE — 90837 PSYTX W PT 60 MINUTES: CPT | Performed by: SOCIAL WORKER
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
## 2025-06-25 ENCOUNTER — OFFICE VISIT (OUTPATIENT)
Dept: BEHAVIORAL HEALTH | Facility: CLINIC | Age: 55
End: 2025-06-25
Payer: MEDICAID

## 2025-06-25 DIAGNOSIS — F41.1 GAD (GENERALIZED ANXIETY DISORDER): ICD-10-CM

## 2025-06-25 DIAGNOSIS — F33.2 SEVERE EPISODE OF RECURRENT MAJOR DEPRESSIVE DISORDER, WITHOUT PSYCHOTIC FEATURES (HCC): Primary | ICD-10-CM

## 2025-06-25 DIAGNOSIS — F43.10 POST TRAUMATIC STRESS DISORDER (PTSD): ICD-10-CM

## 2025-06-25 RX ORDER — HYDROXYZINE PAMOATE 25 MG/1
25 CAPSULE ORAL 3 TIMES DAILY PRN
Qty: 60 CAPSULE | Refills: 0 | Status: SHIPPED | OUTPATIENT
Start: 2025-06-25

## 2025-06-25 RX ORDER — LAMOTRIGINE 25 MG/1
TABLET ORAL
Qty: 105 TABLET | Refills: 0 | Status: SHIPPED | OUTPATIENT
Start: 2025-06-25 | End: 2025-08-09

## 2025-06-25 RX ORDER — PRAZOSIN HYDROCHLORIDE 2 MG/1
2 CAPSULE ORAL NIGHTLY
Qty: 30 CAPSULE | Refills: 3 | Status: SHIPPED | OUTPATIENT
Start: 2025-06-25

## 2025-06-25 RX ORDER — PRAZOSIN HYDROCHLORIDE 5 MG/1
5 CAPSULE ORAL NIGHTLY
Qty: 30 CAPSULE | Refills: 3 | Status: SHIPPED | OUTPATIENT
Start: 2025-06-25
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
## 2025-06-26 ENCOUNTER — OFFICE VISIT (OUTPATIENT)
Dept: BEHAVIORAL HEALTH | Facility: CLINIC | Age: 55
End: 2025-06-26
Payer: MEDICAID

## 2025-06-26 DIAGNOSIS — F43.10 POST TRAUMATIC STRESS DISORDER (PTSD): Primary | ICD-10-CM

## 2025-06-26 DIAGNOSIS — F33.1 MODERATE EPISODE OF RECURRENT MAJOR DEPRESSIVE DISORDER (HCC): ICD-10-CM
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
## 2025-07-11 ENCOUNTER — APPOINTMENT (OUTPATIENT)
Dept: BEHAVIORAL HEALTH | Facility: CLINIC | Age: 55
End: 2025-07-11
Payer: MEDICAID

## 2025-07-23 ENCOUNTER — OFFICE VISIT (OUTPATIENT)
Dept: BEHAVIORAL HEALTH | Facility: CLINIC | Age: 55
End: 2025-07-23
Payer: MEDICAID

## 2025-07-23 DIAGNOSIS — F33.1 MODERATE EPISODE OF RECURRENT MAJOR DEPRESSIVE DISORDER (HCC): Primary | ICD-10-CM

## 2025-07-23 PROCEDURE — 90834 PSYTX W PT 45 MINUTES: CPT | Performed by: SOCIAL WORKER

## 2025-07-23 NOTE — PROGRESS NOTES
[facility]  Therapy Progress Note    Patient Name: Sanna Yuen  Patient MRN: [id number]  Today's Date: 7/23/2025     Type of session:Individual psychotherapy  Length of session: 43 minutes  Persons in attendance:Patient    Objective/Observations:   Participation: Active verbal participation   Grooming: Good   Cognition: Alert   Eye contact: Good   Mood: Depressed and Anxious   Affect: Flexible, Congruent with content, and Sad   Thought process: Logical and Circumstantial   Speech: Rate within normal limits and Volume within normal limits   Other:     Current risk:   SUICIDE: Low   Homicide: Low   Self-harm: Low   Relapse: Low   Other:    Safety Plan reviewed? No   If evidence of imminent risk is present, intervention/plan:     Diagnoses:   1. Moderate episode of recurrent major depressive disorder (HCC)          Therapeutic Intervention(s): Cognitive modification, Stressors assessed, and Supportive psychotherapy    Treatment Goal(s)/Objective(s) addressed: In this session, this patient reported that she went to go visit her son, but that it ended up being more work than she anticipated because his partner did not prep the guest bed for her and left a lot of moldy dishes in the sink which she had to end up cleaning. This therapist empathized with this patient about how frustrating this must of been for her. This patient shared that it was, but they did get to spend some quality time together which was nice. This therapist provided this patient space to talk through how it is going at her parents. She shared that her parents have been more distant lately, stating that she is trying not to take it personally. This therapist reminded this patient that she is still healing from her surgeries and to not jump to conclusions about how they are feeling unless they say something to her.      Progress toward Treatment Goals: Mild improvement    Plan:  - Continue Individual therapy    Next  Session:    Brian Flowers L.C.S.W.  7/23/2025

## 2025-08-06 ENCOUNTER — OFFICE VISIT (OUTPATIENT)
Dept: BEHAVIORAL HEALTH | Facility: CLINIC | Age: 55
End: 2025-08-06
Payer: MEDICAID

## 2025-08-06 DIAGNOSIS — F33.2 SEVERE EPISODE OF RECURRENT MAJOR DEPRESSIVE DISORDER, WITHOUT PSYCHOTIC FEATURES (HCC): Primary | ICD-10-CM

## 2025-08-06 DIAGNOSIS — F43.10 POST TRAUMATIC STRESS DISORDER (PTSD): ICD-10-CM

## 2025-08-06 DIAGNOSIS — F41.1 GAD (GENERALIZED ANXIETY DISORDER): ICD-10-CM

## 2025-08-06 PROCEDURE — 90833 PSYTX W PT W E/M 30 MIN: CPT | Performed by: PSYCHIATRY & NEUROLOGY

## 2025-08-06 PROCEDURE — 99214 OFFICE O/P EST MOD 30 MIN: CPT | Performed by: PSYCHIATRY & NEUROLOGY

## 2025-08-06 RX ORDER — HYDROXYZINE PAMOATE 25 MG/1
25 CAPSULE ORAL 3 TIMES DAILY PRN
Qty: 90 CAPSULE | Refills: 2 | Status: SHIPPED | OUTPATIENT
Start: 2025-08-06

## 2025-08-06 RX ORDER — LAMOTRIGINE 100 MG/1
TABLET ORAL
Qty: 63 TABLET | Refills: 0 | Status: SHIPPED | OUTPATIENT
Start: 2025-08-06 | End: 2025-09-10